# Patient Record
Sex: FEMALE | Race: WHITE | Employment: UNEMPLOYED | URBAN - METROPOLITAN AREA
[De-identification: names, ages, dates, MRNs, and addresses within clinical notes are randomized per-mention and may not be internally consistent; named-entity substitution may affect disease eponyms.]

---

## 2017-09-25 ENCOUNTER — HOSPITAL ENCOUNTER (EMERGENCY)
Age: 27
Discharge: HOME | End: 2017-09-25
Attending: FAMILY MEDICINE

## 2017-09-25 VITALS
OXYGEN SATURATION: 98 % | SYSTOLIC BLOOD PRESSURE: 131 MMHG | RESPIRATION RATE: 22 BRPM | HEIGHT: 61 IN | WEIGHT: 156.3 LBS | TEMPERATURE: 99 F | HEART RATE: 68 BPM | DIASTOLIC BLOOD PRESSURE: 62 MMHG | BODY MASS INDEX: 29.51 KG/M2

## 2017-09-25 DIAGNOSIS — K04.7 DENTAL ABSCESS: Primary | ICD-10-CM

## 2017-09-25 RX ORDER — PENICILLIN V POTASSIUM 500 MG/1
500 TABLET, FILM COATED ORAL 4 TIMES DAILY
Qty: 28 TAB | Refills: 0 | Status: SHIPPED | OUTPATIENT
Start: 2017-09-25 | End: 2017-10-02

## 2017-09-25 NOTE — UC PROVIDER NOTE
Patient is a 24 y.o. female presenting with dental problem. The history is provided by the patient. Dental Pain    This is a new problem. The current episode started more than 1 week ago. The problem occurs daily. The problem has not changed since onset. The pain is located in the right lower mouth. The quality of the pain is aching. The pain is at a severity of 8/10. The patient has no cardiac history. Past Medical History:   Diagnosis Date    Asthma     Community acquired pneumonia     Headache     Migraine    Lung nodule     Neurological disorder     migraines    Second hand smoke exposure         History reviewed. No pertinent surgical history. Family History   Problem Relation Age of Onset    Diabetes Father     Diabetes Mother     Heart Disease Father     Hypertension Father     Diabetes Paternal Grandmother     Hypertension Mother         Social History     Social History    Marital status: SINGLE     Spouse name: N/A    Number of children: N/A    Years of education: N/A     Occupational History    Not on file. Social History Main Topics    Smoking status: Current Every Day Smoker     Packs/day: 1.00    Smokeless tobacco: Former User    Alcohol use No    Drug use: No      Comment: denies any marijuana    Sexual activity: Yes     Partners: Male     Birth control/ protection: Condom     Other Topics Concern    Not on file     Social History Narrative    ** Merged History Encounter **                     ALLERGIES: Review of patient's allergies indicates no known allergies. Review of Systems   Constitutional: Negative for chills. HENT: Positive for dental problem. Vitals:    09/25/17 1337   BP: 131/62   Pulse: 68   Resp: 22   Temp: 99 °F (37.2 °C)   SpO2: 98%   Weight: 70.9 kg (156 lb 4.8 oz)   Height: 5' 1\" (1.549 m)       Physical Exam   Constitutional: She is oriented to person, place, and time. She appears well-developed and well-nourished.    HENT: Multiple fractured teeth   Eyes: Conjunctivae are normal.   Cardiovascular: Normal rate and regular rhythm. Pulmonary/Chest: Effort normal and breath sounds normal.   Neurological: She is alert and oriented to person, place, and time. Skin: Skin is warm and dry. Psychiatric: She has a normal mood and affect. Her behavior is normal. Judgment and thought content normal.   Nursing note and vitals reviewed. MDM     Differential Diagnosis; Clinical Impression; Plan:     CLINICAL IMPRESSION:  Dental abscess  (primary encounter diagnosis)    Plan:  1. PCN  2.   3.   Risk of Significant Complications, Morbidity, and/or Mortality:   Presenting problems: Moderate  Diagnostic procedures: Moderate  Management options:   Moderate  Progress:   Patient progress:  Stable      Procedures

## 2017-09-25 NOTE — DISCHARGE INSTRUCTIONS
Abscessed Tooth: Care Instructions  Your Care Instructions    An abscessed tooth is a tooth that has a pocket of pus in the tissues around it. Pus forms when the body tries to fight an infection caused by bacteria. If the pus cannot drain, it forms an abscess. An abscessed tooth can cause red, swollen gums and throbbing pain, especially when you chew. You may have a bad taste in your mouth and a fever, and your jaw may swell. Damage to the tooth, untreated tooth decay, or gum disease can cause an abscessed tooth. An abscessed tooth needs to be treated by a dental professional right away. If it is not treated, the infection could spread to other parts of your body. Your dentist will give you antibiotics to stop the infection. He or she may make a hole in the tooth or cut open (ebonie) the abscess inside your mouth so that the infection can drain, which should relieve your pain. You may need to have a root canal treatment, which tries to save your tooth by taking out the infected pulp and replacing it with a healing medicine and/or a filling. If these treatments do not work, your tooth may have to be removed. Follow-up care is a key part of your treatment and safety. Be sure to make and go to all appointments, and call your doctor if you are having problems. It's also a good idea to know your test results and keep a list of the medicines you take. How can you care for yourself at home? · Reduce pain and swelling in your face and jaw by putting ice or a cold pack on the outside of your cheek for 10 to 20 minutes at a time. Put a thin cloth between the ice and your skin. · Take pain medicines exactly as directed. ¨ If the doctor gave you a prescription medicine for pain, take it as prescribed. ¨ If you are not taking a prescription pain medicine, ask your doctor if you can take an over-the-counter medicine. · Take your antibiotics as directed. Do not stop taking them just because you feel better.  You need to take the full course of antibiotics. To prevent tooth abscess  · Brush and floss every day, and have regular dental checkups. · Eat a healthy diet, and avoid sugary foods and drinks. · Do not smoke, use e-cigarettes with nicotine, or use spit tobacco. Tobacco and nicotine slow your ability to heal. Tobacco also increases your risk for gum disease and cancer of the mouth and throat. If you need help quitting, talk to your doctor about stop-smoking programs and medicines. These can increase your chances of quitting for good. When should you call for help? Call 911 anytime you think you may need emergency care. For example, call if:  · You have trouble breathing. Call your doctor now or seek immediate medical care if:  · You have new or worse symptoms of infection, such as:  ¨ Increased pain, swelling, warmth, or redness. ¨ Red streaks leading from the area. ¨ Pus draining from the area. ¨ A fever. Watch closely for changes in your health, and be sure to contact your doctor if:  · You do not get better as expected. Where can you learn more? Go to http://yulia-amilcar.info/. Enter C305 in the search box to learn more about \"Abscessed Tooth: Care Instructions. \"  Current as of: September 19, 2016  Content Version: 11.3  © 8802-1223 [employer]. Care instructions adapted under license by [employer] (which disclaims liability or warranty for this information). If you have questions about a medical condition or this instruction, always ask your healthcare professional. [place] [address] any warranty or liability for your use of this information.

## 2017-10-30 ENCOUNTER — HOSPITAL ENCOUNTER (EMERGENCY)
Age: 27
Discharge: HOME | End: 2017-10-30
Attending: FAMILY MEDICINE

## 2017-10-30 VITALS
RESPIRATION RATE: 18 BRPM | DIASTOLIC BLOOD PRESSURE: 85 MMHG | HEIGHT: 61 IN | OXYGEN SATURATION: 98 % | HEART RATE: 72 BPM | TEMPERATURE: 97.8 F | BODY MASS INDEX: 30.21 KG/M2 | WEIGHT: 160 LBS | SYSTOLIC BLOOD PRESSURE: 167 MMHG

## 2017-10-30 DIAGNOSIS — H66.001 ACUTE SUPPURATIVE OTITIS MEDIA OF RIGHT EAR WITHOUT SPONTANEOUS RUPTURE OF TYMPANIC MEMBRANE, RECURRENCE NOT SPECIFIED: Primary | ICD-10-CM

## 2017-10-30 RX ORDER — AMOXICILLIN AND CLAVULANATE POTASSIUM 875; 125 MG/1; MG/1
1 TABLET, FILM COATED ORAL EVERY 12 HOURS
Qty: 20 TAB | Refills: 0 | Status: SHIPPED | OUTPATIENT
Start: 2017-10-30 | End: 2017-11-09

## 2017-10-30 NOTE — DISCHARGE INSTRUCTIONS
Ear Infection (Otitis Media): Care Instructions  Your Care Instructions    An ear infection may start with a cold and affect the middle ear (otitis media). It can hurt a lot. Most ear infections clear up on their own in a couple of days. Most often you will not need antibiotics. This is because many ear infections are caused by a virus. Antibiotics don't work against a virus. Regular doses of pain medicines are the best way to reduce your fever and help you feel better. Follow-up care is a key part of your treatment and safety. Be sure to make and go to all appointments, and call your doctor if you are having problems. It's also a good idea to know your test results and keep a list of the medicines you take. How can you care for yourself at home? · Take pain medicines exactly as directed. ¨ If the doctor gave you a prescription medicine for pain, take it as prescribed. ¨ If you are not taking a prescription pain medicine, take an over-the-counter medicine, such as acetaminophen (Tylenol), ibuprofen (Advil, Motrin), or naproxen (Aleve). Read and follow all instructions on the label. ¨ Do not take two or more pain medicines at the same time unless the doctor told you to. Many pain medicines have acetaminophen, which is Tylenol. Too much acetaminophen (Tylenol) can be harmful. · Plan to take a full dose of pain reliever before bedtime. Getting enough sleep will help you get better. · Try a warm, moist washcloth on the ear. It may help relieve pain. · If your doctor prescribed antibiotics, take them as directed. Do not stop taking them just because you feel better. You need to take the full course of antibiotics. When should you call for help? Call your doctor now or seek immediate medical care if:  ? · You have new or increasing ear pain. ? · You have new or increasing pus or blood draining from your ear. ? · You have a fever with a stiff neck or a severe headache. ? Watch closely for changes in your health, and be sure to contact your doctor if:  ? · You have new or worse symptoms. ? · You are not getting better after taking an antibiotic for 2 days. Where can you learn more? Go to http://yulia-amilcar.info/. Enter C548 in the search box to learn more about \"Ear Infection (Otitis Media): Care Instructions. \"  Current as of: May 12, 2017  Content Version: 11.4  © 5565-4250 [employer]. Care instructions adapted under license by [employer] (which disclaims liability or warranty for this information). If you have questions about a medical condition or this instruction, always ask your healthcare professional. [place] [address] any warranty or liability for your use of this information.

## 2017-10-30 NOTE — UC PROVIDER NOTE
Patient is a 24 y.o. female presenting with ear pain. The history is provided by the patient. Ear Pain    This is a new problem. Episode onset: 2 weeks ago. The problem occurs constantly. The problem has been gradually worsening. Patient complains that the right ear is affected. There has been no fever. The pain is mild. Associated symptoms include rhinorrhea, sore throat and cough. Pertinent negatives include no ear discharge, no headaches, no hearing loss and no rash. Past Medical History:   Diagnosis Date    Asthma     Community acquired pneumonia     Headache     Migraine    Lung nodule     Neurological disorder     migraines    Second hand smoke exposure         No past surgical history on file. Family History   Problem Relation Age of Onset    Diabetes Father     Diabetes Mother     Heart Disease Father     Hypertension Father     Diabetes Paternal Grandmother     Hypertension Mother         Social History     Social History    Marital status: SINGLE     Spouse name: N/A    Number of children: N/A    Years of education: N/A     Occupational History    Not on file. Social History Main Topics    Smoking status: Current Every Day Smoker     Packs/day: 1.00    Smokeless tobacco: Former User    Alcohol use No    Drug use: No      Comment: denies any marijuana    Sexual activity: Yes     Partners: Male     Birth control/ protection: Condom     Other Topics Concern    Not on file     Social History Narrative    ** Merged History Encounter **                     ALLERGIES: Review of patient's allergies indicates no known allergies. Review of Systems   Constitutional: Negative for chills and fever. HENT: Positive for congestion, ear pain, rhinorrhea and sore throat. Negative for ear discharge and hearing loss. Respiratory: Positive for cough. Negative for shortness of breath and wheezing. Cardiovascular: Negative for chest pain and palpitations.    Musculoskeletal: Negative for myalgias. Skin: Negative for rash. Neurological: Negative for headaches. Vitals:    10/30/17 1925   BP: 167/85   Pulse: 72   Resp: 18   Temp: 97.8 °F (36.6 °C)   SpO2: 98%   Weight: 72.6 kg (160 lb)   Height: 5' 1\" (1.549 m)       Physical Exam   Constitutional: She appears well-developed and well-nourished. No distress. HENT:   Right Ear: External ear and ear canal normal. Tympanic membrane is erythematous and bulging. A middle ear effusion is present. Left Ear: Tympanic membrane, external ear and ear canal normal.   Nose: Rhinorrhea present. Right sinus exhibits maxillary sinus tenderness and frontal sinus tenderness. Left sinus exhibits no maxillary sinus tenderness and no frontal sinus tenderness. Mouth/Throat: Mucous membranes are normal. Posterior oropharyngeal edema and posterior oropharyngeal erythema present. No oropharyngeal exudate or tonsillar abscesses. Cardiovascular: Normal rate, regular rhythm and normal heart sounds. Pulmonary/Chest: Effort normal and breath sounds normal. No respiratory distress. She has no wheezes. She has no rales. Lymphadenopathy:     She has cervical adenopathy. Neurological: She is alert. Skin: She is not diaphoretic. Psychiatric: She has a normal mood and affect. Her behavior is normal. Judgment and thought content normal.   Nursing note and vitals reviewed. MDM     Differential Diagnosis; Clinical Impression; Plan:     CLINICAL IMPRESSION:  Acute suppurative otitis media of right ear without spontaneous rupture of tympanic membrane, recurrence not specified  (primary encounter diagnosis)    Plan:  1. Augmentin  2. PCP as needed  Risk of Significant Complications, Morbidity, and/or Mortality:   Presenting problems: Moderate  Management options:   Moderate  Progress:   Patient progress:  Stable      Procedures

## 2018-05-20 ENCOUNTER — HOSPITAL ENCOUNTER (EMERGENCY)
Age: 28
Discharge: HOME | End: 2018-05-20
Attending: EMERGENCY MEDICINE
Payer: SUBSIDIZED

## 2018-05-20 ENCOUNTER — APPOINTMENT (OUTPATIENT)
Dept: ULTRASOUND IMAGING | Age: 28
End: 2018-05-20
Attending: EMERGENCY MEDICINE
Payer: SUBSIDIZED

## 2018-05-20 VITALS
DIASTOLIC BLOOD PRESSURE: 76 MMHG | WEIGHT: 148.59 LBS | HEART RATE: 66 BPM | BODY MASS INDEX: 28.05 KG/M2 | OXYGEN SATURATION: 100 % | TEMPERATURE: 97.6 F | HEIGHT: 61 IN | RESPIRATION RATE: 16 BRPM | SYSTOLIC BLOOD PRESSURE: 134 MMHG

## 2018-05-20 DIAGNOSIS — R10.11 ABDOMINAL PAIN, RIGHT UPPER QUADRANT: Primary | ICD-10-CM

## 2018-05-20 DIAGNOSIS — K80.80 BILIARY CALCULUS OF OTHER SITE WITHOUT OBSTRUCTION: ICD-10-CM

## 2018-05-20 DIAGNOSIS — R11.0 NAUSEA WITHOUT VOMITING: ICD-10-CM

## 2018-05-20 LAB
ALBUMIN SERPL-MCNC: 4.1 G/DL (ref 3.5–5)
ALBUMIN/GLOB SERPL: 1 {RATIO} (ref 1.1–2.2)
ALP SERPL-CCNC: 83 U/L (ref 45–117)
ALT SERPL-CCNC: 13 U/L (ref 12–78)
ANION GAP SERPL CALC-SCNC: 6 MMOL/L (ref 5–15)
AST SERPL-CCNC: 13 U/L (ref 15–37)
BASOPHILS # BLD: 0 K/UL (ref 0–0.1)
BASOPHILS NFR BLD: 0 % (ref 0–1)
BILIRUB SERPL-MCNC: 0.2 MG/DL (ref 0.2–1)
BUN SERPL-MCNC: 11 MG/DL (ref 6–20)
BUN/CREAT SERPL: 12 (ref 12–20)
CALCIUM SERPL-MCNC: 9.8 MG/DL (ref 8.5–10.1)
CHLORIDE SERPL-SCNC: 104 MMOL/L (ref 97–108)
CO2 SERPL-SCNC: 30 MMOL/L (ref 21–32)
CREAT SERPL-MCNC: 0.94 MG/DL (ref 0.55–1.02)
DIFFERENTIAL METHOD BLD: ABNORMAL
EOSINOPHIL # BLD: 0.4 K/UL (ref 0–0.4)
EOSINOPHIL NFR BLD: 4 % (ref 0–7)
ERYTHROCYTE [DISTWIDTH] IN BLOOD BY AUTOMATED COUNT: 11.9 % (ref 11.5–14.5)
GLOBULIN SER CALC-MCNC: 4.2 G/DL (ref 2–4)
GLUCOSE SERPL-MCNC: 89 MG/DL (ref 65–100)
HCG UR QL: NEGATIVE
HCT VFR BLD AUTO: 39 % (ref 35–47)
HGB BLD-MCNC: 13 G/DL (ref 11.5–16)
IMM GRANULOCYTES # BLD: 0 K/UL (ref 0–0.04)
IMM GRANULOCYTES NFR BLD AUTO: 0 % (ref 0–0.5)
LIPASE SERPL-CCNC: 136 U/L (ref 73–393)
LYMPHOCYTES # BLD: 3.9 K/UL (ref 0.8–3.5)
LYMPHOCYTES NFR BLD: 38 % (ref 12–49)
MCH RBC QN AUTO: 30.7 PG (ref 26–34)
MCHC RBC AUTO-ENTMCNC: 33.3 G/DL (ref 30–36.5)
MCV RBC AUTO: 92 FL (ref 80–99)
MONOCYTES # BLD: 0.6 K/UL (ref 0–1)
MONOCYTES NFR BLD: 5 % (ref 5–13)
NEUTS SEG # BLD: 5.4 K/UL (ref 1.8–8)
NEUTS SEG NFR BLD: 53 % (ref 32–75)
NRBC # BLD: 0 K/UL (ref 0–0.01)
NRBC BLD-RTO: 0 PER 100 WBC
PLATELET # BLD AUTO: 445 K/UL (ref 150–400)
PMV BLD AUTO: 9.7 FL (ref 8.9–12.9)
POTASSIUM SERPL-SCNC: 3.5 MMOL/L (ref 3.5–5.1)
PROT SERPL-MCNC: 8.3 G/DL (ref 6.4–8.2)
RBC # BLD AUTO: 4.24 M/UL (ref 3.8–5.2)
SODIUM SERPL-SCNC: 140 MMOL/L (ref 136–145)
WBC # BLD AUTO: 10.3 K/UL (ref 3.6–11)

## 2018-05-20 PROCEDURE — 36415 COLL VENOUS BLD VENIPUNCTURE: CPT | Performed by: EMERGENCY MEDICINE

## 2018-05-20 PROCEDURE — 81025 URINE PREGNANCY TEST: CPT | Performed by: EMERGENCY MEDICINE

## 2018-05-20 PROCEDURE — 99283 EMERGENCY DEPT VISIT LOW MDM: CPT

## 2018-05-20 PROCEDURE — 83690 ASSAY OF LIPASE: CPT | Performed by: EMERGENCY MEDICINE

## 2018-05-20 PROCEDURE — 74011250637 HC RX REV CODE- 250/637: Performed by: EMERGENCY MEDICINE

## 2018-05-20 PROCEDURE — 76705 ECHO EXAM OF ABDOMEN: CPT

## 2018-05-20 PROCEDURE — 80053 COMPREHEN METABOLIC PANEL: CPT | Performed by: EMERGENCY MEDICINE

## 2018-05-20 PROCEDURE — 85025 COMPLETE CBC W/AUTO DIFF WBC: CPT | Performed by: EMERGENCY MEDICINE

## 2018-05-20 RX ORDER — ONDANSETRON 4 MG/1
4 TABLET, ORALLY DISINTEGRATING ORAL
Qty: 20 TAB | Refills: 0 | Status: SHIPPED | OUTPATIENT
Start: 2018-05-20 | End: 2018-08-17

## 2018-05-20 RX ORDER — ONDANSETRON 4 MG/1
4 TABLET, ORALLY DISINTEGRATING ORAL
Status: COMPLETED | OUTPATIENT
Start: 2018-05-20 | End: 2018-05-20

## 2018-05-20 RX ORDER — OXYCODONE AND ACETAMINOPHEN 5; 325 MG/1; MG/1
1 TABLET ORAL
Status: COMPLETED | OUTPATIENT
Start: 2018-05-20 | End: 2018-05-20

## 2018-05-20 RX ORDER — FAMOTIDINE 20 MG/1
20 TABLET, FILM COATED ORAL 2 TIMES DAILY
Qty: 20 TAB | Refills: 0 | Status: SHIPPED | OUTPATIENT
Start: 2018-05-20 | End: 2018-05-30

## 2018-05-20 RX ORDER — OXYCODONE AND ACETAMINOPHEN 5; 325 MG/1; MG/1
1 TABLET ORAL
Qty: 10 TAB | Refills: 0 | Status: ON HOLD | OUTPATIENT
Start: 2018-05-20 | End: 2018-05-29

## 2018-05-20 RX ADMIN — ONDANSETRON 4 MG: 4 TABLET, ORALLY DISINTEGRATING ORAL at 21:05

## 2018-05-20 RX ADMIN — OXYCODONE HYDROCHLORIDE AND ACETAMINOPHEN 1 TABLET: 5; 325 TABLET ORAL at 21:20

## 2018-05-21 NOTE — ED NOTES
Pt presents to ED with c/o right sided flank pain x3 weeks. Pt notices the pain after eating certain foods like dairy or fatty foods. Pt states the pain worsens at night as well. Pt's mother at bedside. Call bell in reach.

## 2018-05-21 NOTE — ED PROVIDER NOTES
EMERGENCY DEPARTMENT HISTORY AND PHYSICAL EXAM      Date: 5/20/2018  Patient Name: aMnny Roman    History of Presenting Illness     Chief Complaint   Patient presents with    Abdominal Pain     Pain at RUQ for 2-3 months, worse at night and after eating certain food. Pain worse today with nausea. History Provided By: Patient    HPI: Manny Roman, 34 y.o. female with PMHx significant for migraines, asthma, presents ambulatory with her mother to the ED with cc of gradual onset, intermittent, moderate RUQ abdominal pain with associated N/V x several months, worsened in the past few days. Her pain radiates to her back. Pt reports that her pain is worse at night after meals, especially greasy/fatty foods. Her pain, in the past, was relieved after emesis but is now persistent. She denies hx of gall stones. She specifically denies diarrhea or fever. There are no other complaints, changes, or physical findings at this time. Social Hx: former Tobacco, - EtOH, - Illicit Drugs    PCP: Sharon Herr MD     Past History     Past Medical History:  Past Medical History:   Diagnosis Date    Asthma     Community acquired pneumonia     Headache(784.0)     Migraine    Lung nodule     Neurological disorder     migraines    Second hand smoke exposure      Past Surgical History:  History reviewed. No pertinent surgical history. Family History:  Family History   Problem Relation Age of Onset    Diabetes Father     Heart Disease Father     Hypertension Father     Diabetes Mother     Hypertension Mother     Diabetes Paternal Grandmother      Social History:  Social History   Substance Use Topics    Smoking status: Former Smoker     Packs/day: 1.00     Years: 5.00     Quit date: 7/20/2017    Smokeless tobacco: Never Used    Alcohol use No     Allergies:  No Known Allergies    Review of Systems   Review of Systems   Constitutional: Negative. Negative for chills and fever. HENT: Negative.   Negative for congestion, facial swelling, rhinorrhea, sore throat, trouble swallowing and voice change. Eyes: Negative. Respiratory: Negative for apnea, cough, chest tightness, shortness of breath and wheezing. Cardiovascular: Negative. Negative for chest pain, palpitations and leg swelling. Gastrointestinal: Positive for abdominal pain (RUQ), nausea and vomiting. Negative for abdominal distention, blood in stool, constipation and diarrhea. Endocrine: Negative. Negative for cold intolerance, heat intolerance and polyuria. Genitourinary: Negative. Negative for difficulty urinating, dysuria, flank pain, frequency, hematuria and urgency. Musculoskeletal: Negative. Negative for arthralgias, back pain, myalgias, neck pain and neck stiffness. Skin: Negative. Negative for color change and rash. Neurological: Negative. Negative for dizziness, syncope, facial asymmetry, speech difficulty, weakness, light-headedness, numbness and headaches. Hematological: Negative. Does not bruise/bleed easily. Psychiatric/Behavioral: Negative. Negative for confusion and self-injury. The patient is not nervous/anxious. Physical Exam   Physical Exam   Constitutional: She is oriented to person, place, and time. She appears well-developed and well-nourished. No distress. HENT:   Head: Normocephalic and atraumatic. Mouth/Throat: Oropharynx is clear and moist. No oropharyngeal exudate. Eyes: Conjunctivae and EOM are normal. Pupils are equal, round, and reactive to light. Neck: Normal range of motion. Cardiovascular: Normal rate, regular rhythm and normal heart sounds. Exam reveals no gallop and no friction rub. No murmur heard. Pulmonary/Chest: Effort normal and breath sounds normal. No respiratory distress. She has no wheezes. She has no rales. She exhibits no tenderness. Abdominal: Soft. Bowel sounds are normal. She exhibits no distension and no mass. There is tenderness in the right upper quadrant. There is no rebound, no guarding and negative Oliva's sign. Musculoskeletal: Normal range of motion. She exhibits no edema, tenderness or deformity. Neurological: She is alert and oriented to person, place, and time. She displays normal reflexes. No cranial nerve deficit. She exhibits normal muscle tone. Coordination normal.   Skin: Skin is warm. No rash noted. She is not diaphoretic. Psychiatric: She has a normal mood and affect. Nursing note and vitals reviewed. Diagnostic Study Results     Labs -     Recent Results (from the past 12 hour(s))   CBC WITH AUTOMATED DIFF    Collection Time: 05/20/18  7:29 PM   Result Value Ref Range    WBC 10.3 3.6 - 11.0 K/uL    RBC 4.24 3.80 - 5.20 M/uL    HGB 13.0 11.5 - 16.0 g/dL    HCT 39.0 35.0 - 47.0 %    MCV 92.0 80.0 - 99.0 FL    MCH 30.7 26.0 - 34.0 PG    MCHC 33.3 30.0 - 36.5 g/dL    RDW 11.9 11.5 - 14.5 %    PLATELET 992 (H) 299 - 400 K/uL    MPV 9.7 8.9 - 12.9 FL    NRBC 0.0 0  WBC    ABSOLUTE NRBC 0.00 0.00 - 0.01 K/uL    NEUTROPHILS 53 32 - 75 %    LYMPHOCYTES 38 12 - 49 %    MONOCYTES 5 5 - 13 %    EOSINOPHILS 4 0 - 7 %    BASOPHILS 0 0 - 1 %    IMMATURE GRANULOCYTES 0 0.0 - 0.5 %    ABS. NEUTROPHILS 5.4 1.8 - 8.0 K/UL    ABS. LYMPHOCYTES 3.9 (H) 0.8 - 3.5 K/UL    ABS. MONOCYTES 0.6 0.0 - 1.0 K/UL    ABS. EOSINOPHILS 0.4 0.0 - 0.4 K/UL    ABS. BASOPHILS 0.0 0.0 - 0.1 K/UL    ABS. IMM.  GRANS. 0.0 0.00 - 0.04 K/UL    DF AUTOMATED     METABOLIC PANEL, COMPREHENSIVE    Collection Time: 05/20/18  7:29 PM   Result Value Ref Range    Sodium 140 136 - 145 mmol/L    Potassium 3.5 3.5 - 5.1 mmol/L    Chloride 104 97 - 108 mmol/L    CO2 30 21 - 32 mmol/L    Anion gap 6 5 - 15 mmol/L    Glucose 89 65 - 100 mg/dL    BUN 11 6 - 20 MG/DL    Creatinine 0.94 0.55 - 1.02 MG/DL    BUN/Creatinine ratio 12 12 - 20      GFR est AA >60 >60 ml/min/1.73m2    GFR est non-AA >60 >60 ml/min/1.73m2    Calcium 9.8 8.5 - 10.1 MG/DL    Bilirubin, total 0.2 0.2 - 1.0 MG/DL    ALT (SGPT) 13 12 - 78 U/L    AST (SGOT) 13 (L) 15 - 37 U/L    Alk. phosphatase 83 45 - 117 U/L    Protein, total 8.3 (H) 6.4 - 8.2 g/dL    Albumin 4.1 3.5 - 5.0 g/dL    Globulin 4.2 (H) 2.0 - 4.0 g/dL    A-G Ratio 1.0 (L) 1.1 - 2.2     LIPASE    Collection Time: 05/20/18  7:29 PM   Result Value Ref Range    Lipase 136 73 - 393 U/L   HCG URINE, QL    Collection Time: 05/20/18  7:30 PM   Result Value Ref Range    HCG urine, QL NEGATIVE  NEG       Radiologic Studies -   ULTRASOUND OF THE RIGHT UPPER QUADRANT     INDICATION: Right upper quadrant abdominal pain, gallstones     COMPARISON: None.     TECHNIQUE:  Sonography of the right upper quadrant was performed.      FINDINGS:     LIVER: Normal echogenicity. No focal hepatic mass or intrahepatic biliary  dilatation is shown. GALLBLADDER: There is at least one large gallstone in the neck, as well as a  small volume of sludge. The distal gallbladder is collapsed. No pericholecystic  fluid. COMMON DUCT: 0. 5 cm in diameter. The duct is normal caliber. PANCREAS: The visualized portions of the pancreas are normal.   RIGHT KIDNEY: 10.8 cm in length. No hydronephrosis, shadowing calculus, or  contour-deforming renal mass.        IMPRESSION  IMPRESSION:   Gallstone(s) and sludge. Medical Decision Making   I am the first provider for this patient. I reviewed the vital signs, available nursing notes, past medical history, past surgical history, family history and social history. Vital Signs-Reviewed the patient's vital signs. Patient Vitals for the past 12 hrs:   Temp Pulse Resp BP SpO2   05/20/18 1923 97.6 °F (36.4 °C) 66 16 134/76 100 %     Pulse Oximetry Analysis - 100% on RA    Records Reviewed: Nursing Notes, Old Medical Records, Previous electrocardiograms, Previous Radiology Studies and Previous Laboratory Studies    Provider Notes (Medical Decision Making):   DDx: biliary colic, cholecystitis, UTI  Pt is a 35 yo F, presenting with biliary colic, and RUQ pain. Her vital signs are stable. Will obtain labs, RUQ US, and reassess. ED Course:   Initial assessment performed. The patients presenting problems have been discussed, and they are in agreement with the care plan formulated and outlined with them. I have encouraged them to ask questions as they arise throughout their visit. Progress Note:  Labs and imaging reviewed; RUQ with gallstone at neck but no pericholecystic fluid, no CBD dilation, normal labs; pt's exam is benign. Will dc home with pain control, diet education, and surgery referral.    9:26 PM  Progress Note:  Pt states she feels much better; pain resolved; denies any nausea; no new symptoms; pt able to tolerate PO and ambulate without issues; pt clinically safe for discharge home with close PCP f/u. At time of discharge, pt had stable vitals and had no questions or concerns, and was very satisfied with overall care. Critical Care Time:   0    Disposition:  Discharge Note:  9:35 PM  The patient has been re-evaluated and is ready for discharge. Reviewed available results with patient. Counseled patient/parent/guardian on diagnosis and care plan. Patient has expressed understanding, and all questions have been answered. Patient agrees with plan and agrees to follow up as recommended, or return to the ED if their symptoms worsen. Discharge instructions have been provided and explained to the patient, along with reasons to return to the ED. PLAN:  1. Current Discharge Medication List      START taking these medications    Details   oxyCODONE-acetaminophen (PERCOCET) 5-325 mg per tablet Take 1 Tab by mouth every four (4) hours as needed for Pain. Max Daily Amount: 6 Tabs. Qty: 10 Tab, Refills: 0    Associated Diagnoses: Abdominal pain, right upper quadrant; Biliary calculus of other site without obstruction      ondansetron (ZOFRAN ODT) 4 mg disintegrating tablet Take 1 Tab by mouth every eight (8) hours as needed for Nausea.   Qty: 20 Tab, Refills: 0    Associated Diagnoses: Abdominal pain, right upper quadrant; Biliary calculus of other site without obstruction      famotidine (PEPCID) 20 mg tablet Take 1 Tab by mouth two (2) times a day for 10 days. Qty: 20 Tab, Refills: 0    Associated Diagnoses: Abdominal pain, right upper quadrant; Biliary calculus of other site without obstruction           2. Follow-up Information     Follow up With Details Comments [address], [address]  [phone]      [facility] EMERGENCY DEPT  As needed, If symptoms worsen [address] [facility] In 1 day  [address] [facility], [address] [place] [address] [address] [facility] [address]  [phone]        Return to ED if worse     Diagnosis     Clinical Impression:   1. Abdominal pain, right upper quadrant    2. Biliary calculus of other site without obstruction    3. Nausea without vomiting      Attestations:    Attestation: This note is prepared by Jaron Yeboah, acting as scribe for MD Leila Tellez MD: The scribe's documentation has been prepared under my direction and personally reviewed by me in its entirety. I confirm that the note above accurately reflects all work, treatment, procedures, and medical decision making performed by me. This note will not be viewable in [address]

## 2018-05-21 NOTE — DISCHARGE INSTRUCTIONS
Thank you! Thank you for allowing us to provide you with excellent care today. We hope we addressed all of your concerns and needs. We strive to provide excellent quality care in the Emergency Department. You may receive a survey after your visit to evaluate the care you were provided. Should you receive a survey from us, we invite you to share your experience and tell us what made it excellent. It was a pleasure serving you, we invite you to share your experience with us, in our pursuit for excellence, should you be selected to receive a survey. If you feel that you have not received excellent quality care or timely care, please ask to speak to the nurse manager. Please choose us in the future for your continued health care needs. ------------------------------------------------------------------------------------------------------------  The exam and treatment you received in the Emergency Department were for an urgent problem and are not intended as complete care. It is important that you follow up with a doctor, nurse practitioner, or physician assistant for ongoing care. If your symptoms become worse or you do not improve as expected and you are unable to reach your usual health care provider, you should return to the Emergency Department. We are available 24 hours a day. Please take your discharge instructions with you when you go to your follow-up appointment. If you have any problem arranging a follow-up appointment, contact the Emergency Department immediately. If a prescription has been provided, please have it filled as soon as possible to prevent a delay in treatment. Read the entire medication instruction sheet provided to you by the pharmacy. If you have any questions or reservations about taking the medication due to side effects or interactions with other medications, please call your primary care physician or contact the ER to speak with the charge nurse.      Make an appointment with your family doctor or the physician you were referred to for follow-up of this visit as instructed on your discharge paperwork, as this is mandatory follow-up. Return to the ER if you are unable to be seen or if you are unable to be seen in a timely manner. If you have any problem arranging the follow-up visit, contact the Emergency Department immediately. Abdominal Pain: Care Instructions  Your Care Instructions    Abdominal pain has many possible causes. Some aren't serious and get better on their own in a few days. Others need more testing and treatment. If your pain continues or gets worse, you need to be rechecked and may need more tests to find out what is wrong. You may need surgery to correct the problem. Don't ignore new symptoms, such as fever, nausea and vomiting, urination problems, pain that gets worse, and dizziness. These may be signs of a more serious problem. Your doctor may have recommended a follow-up visit in the next 8 to 12 hours. If you are not getting better, you may need more tests or treatment. The doctor has checked you carefully, but problems can develop later. If you notice any problems or new symptoms, get medical treatment right away. Follow-up care is a key part of your treatment and safety. Be sure to make and go to all appointments, and call your doctor if you are having problems. It's also a good idea to know your test results and keep a list of the medicines you take. How can you care for yourself at home? · Rest until you feel better. · To prevent dehydration, drink plenty of fluids, enough so that your urine is light yellow or clear like water. Choose water and other caffeine-free clear liquids until you feel better. If you have kidney, heart, or liver disease and have to limit fluids, talk with your doctor before you increase the amount of fluids you drink.   · If your stomach is upset, eat mild foods, such as rice, dry toast or crackers, bananas, and applesauce. Try eating several small meals instead of two or three large ones. · Wait until 48 hours after all symptoms have gone away before you have spicy foods, alcohol, and drinks that contain caffeine. · Do not eat foods that are high in fat. · Avoid anti-inflammatory medicines such as aspirin, ibuprofen (Advil, Motrin), and naproxen (Aleve). These can cause stomach upset. Talk to your doctor if you take daily aspirin for another health problem. When should you call for help? Call 911 anytime you think you may need emergency care. For example, call if:  ? · You passed out (lost consciousness). ? · You pass maroon or very bloody stools. ? · You vomit blood or what looks like coffee grounds. ? · You have new, severe belly pain. ?Call your doctor now or seek immediate medical care if:  ? · Your pain gets worse, especially if it becomes focused in one area of your belly. ? · You have a new or higher fever. ? · Your stools are black and look like tar, or they have streaks of blood. ? · You have unexpected vaginal bleeding. ? · You have symptoms of a urinary tract infection. These may include:  ¨ Pain when you urinate. ¨ Urinating more often than usual.  ¨ Blood in your urine. ? · You are dizzy or lightheaded, or you feel like you may faint. ? Watch closely for changes in your health, and be sure to contact your doctor if:  ? · You are not getting better after 1 day (24 hours). Where can you learn more? Go to http://yulia-amilcar.info/. Enter J200 in the search box to learn more about \"Abdominal Pain: Care Instructions. \"  Current as of: March 20, 2017  Content Version: 11.4  © 8541-6369 [employer]. Care instructions adapted under license by [employer] (which disclaims liability or warranty for this information).  If you have questions about a medical condition or this instruction, always ask your healthcare professional. [employer] disclaims any warranty or liability for your use of this information.

## 2018-05-21 NOTE — ED NOTES
Patient discharged by Dr. Obdulia Bolden. Patient provided with discharge instructions Rx and instructions on follow up care. Patient out of ED ambulatory accompanied by mother.

## 2018-05-21 NOTE — ED TRIAGE NOTES
Pt arrives to Ed with c/o right sided flank pain after eating fatty and/or dairy foods, pt states pain worsens at night x3 weeks.  Mother at bedside

## 2018-05-22 ENCOUNTER — OFFICE VISIT (OUTPATIENT)
Dept: SURGERY | Age: 28
End: 2018-05-22

## 2018-05-22 VITALS
WEIGHT: 144 LBS | TEMPERATURE: 99.4 F | HEIGHT: 61 IN | BODY MASS INDEX: 27.19 KG/M2 | SYSTOLIC BLOOD PRESSURE: 120 MMHG | HEART RATE: 62 BPM | OXYGEN SATURATION: 96 % | RESPIRATION RATE: 18 BRPM | DIASTOLIC BLOOD PRESSURE: 66 MMHG

## 2018-05-22 DIAGNOSIS — L98.9 SKIN LESION OF BACK: ICD-10-CM

## 2018-05-22 DIAGNOSIS — K80.20 SYMPTOMATIC CHOLELITHIASIS: Primary | ICD-10-CM

## 2018-05-22 NOTE — PATIENT INSTRUCTIONS
Cholecystectomy: Before Your Surgery  What is cholecystectomy? Cholecystectomy (bd-gpm-gqq-SIMON-tuh-arthur) is a type of surgery. It removes a diseased gallbladder. This surgery is usually done as a laparoscopic surgery. The doctor puts a lighted tube and other surgical tools through small cuts (incisions) in your belly. The tube is called a scope. It lets your doctor see your organs so he or she can do the surgery. The incisions leave scars that fade with time. Most people go home the same day. You probably will feel better each day. Most people have only a small amount of pain after 1 week. If you have a desk job, you can probably go back to work in 1 to 2 weeks. If you lift heavy objects or have a very active job, it may take up to 4 weeks. In some cases, open surgery is the best choice. Your doctor may choose open surgery in advance. Or he or she may choose it in the middle of laparoscopic surgery. In open surgery, the doctor makes a larger incision in your upper belly. If you have open surgery, you will probably stay in the hospital for 2 to 4 days. And it may take 4 to 6 weeks to get back to your normal routine. Follow-up care is a key part of your treatment and safety. Be sure to make and go to all appointments, and call your doctor if you are having problems. It's also a good idea to know your test results and keep a list of the medicines you take. What happens before surgery? ?Surgery can be stressful. This information will help you understand what you can expect. And it will help you safely prepare for surgery. ? Preparing for surgery  ? · Understand exactly what surgery is planned, along with the risks, benefits, and other options. · Tell your doctors ALL the medicines, vitamins, supplements, and herbal remedies you take. Some of these can increase the risk of bleeding or interact with anesthesia. ?  · If you take blood thinners, such as warfarin (Coumadin), clopidogrel (Plavix), or aspirin, be sure to talk to your doctor. He or she will tell you if you should stop taking these medicines before your surgery. Make sure that you understand exactly what your doctor wants you to do.   ? · Your doctor will tell you which medicines to take or stop before your surgery. You may need to stop taking certain medicines a week or more before surgery. So talk to your doctor as soon as you can.   ? · If you have an advance directive, let your doctor know. It may include a living will and a durable power of  for health care. Bring a copy to the hospital. If you don't have one, you may want to prepare one. It lets your doctor and loved ones know your health care wishes. Doctors advise that everyone prepare these papers before any type of surgery or procedure. ? · You may need to empty your colon with an enema or laxative. Your doctor will tell you how to do this. What happens on the day of surgery? · Follow the instructions exactly about when to stop eating and drinking. If you don't, your surgery may be canceled. If your doctor told you to take your medicines on the day of surgery, take them with only a sip of water. ? · Take a bath or shower before you come in for your surgery. Do not apply lotions, perfumes, deodorants, or nail polish. ? · Do not shave the surgical site yourself. ? · Take off all jewelry and piercings. And take out contact lenses, if you wear them. ? At the hospital or surgery center   · Bring a picture ID. ? · The area for surgery is often marked to make sure there are no errors. ? · You will be kept comfortable and safe by your anesthesia provider. You will be asleep during the surgery. ? · The surgery usually takes 1 to 2 hours. Going home   · Be sure you have someone to drive you home. Anesthesia and pain medicine make it unsafe for you to drive. ? · You will be given more specific instructions about recovering from your surgery.  They will cover things like diet, wound care, follow-up care, driving, and getting back to your normal routine. When should you call your doctor? · You have questions or concerns. ? · You don't understand how to prepare for your surgery. ? · You become ill before the surgery (such as fever, flu, or a cold). ? · You need to reschedule or have changed your mind about having the surgery. Where can you learn more? Go to http://yulia-amilcar.info/. Enter Q484 in the search box to learn more about \"Cholecystectomy: Before Your Surgery. \"  Current as of: May 12, 2017  Content Version: 11.4  © 6325-7468 Healthwise, [employer]. Care instructions adapted under license by [employer] (which disclaims liability or warranty for this information). If you have questions about a medical condition or this instruction, always ask your healthcare professional. [address] any warranty or liability for your use of this information.

## 2018-05-22 NOTE — PROGRESS NOTES
1. Have you been to the ER, urgent care clinic since your last visit? Hospitalized since your last visit? ED [facility] ED 5/20/18   abd pain    2. Have you seen or consulted any other health care providers outside of the [facility] since your last visit? Include any pap smears or colon screening.    no

## 2018-05-22 NOTE — MR AVS SNAPSHOT
[address] [facility] [address] [facility] [address] [id number] 
[phone] Patient: Jason Ramsey MRN: [id number] QOD:0/0/8738 Visit Information Date & Time Provider Department Dept. Phone Encounter #  
 5/22/2018  2:00 PM [facility] [id number] [phone] [id number] Your Appointments 6/11/2018 10:40 AM  
POST OP with Cayla Eid NP  
[facility] GENERAL SURGERY [address] ([address] [facility]) Appt Note: 5/29: NL: LAP JANEEN WITH EXCISION OF SKIN LESION OF THE BACK,   PO  
 [address] [facility] [phone]  
[address] [facility] Upcoming Health Maintenance Date Due  
 HPV Age 9Y-34Y (1 of 3 - Female 3 Dose Series) 3/3/2003 Pneumococcal 19-64 Medium Risk (1 of 1 - PPSV23) 3/3/2011 DTaP/Tdap/Td series (1 - Tdap) 3/3/2013 PAP AKA CERVICAL CYTOLOGY 3/3/2013 Influenza Age 5 to Adult 8/1/2018 Allergies as of 5/22/2018  Review Complete On: 5/22/2018 By: Tavo Scott MD  
 No Known Allergies Current Immunizations  Never Reviewed No immunizations on file. Not reviewed this visit You Were Diagnosed With   
  
 Codes Comments Symptomatic cholelithiasis    -  Primary ICD-10-CM: K80.20 ICD-9-CM: 574.20 Skin lesion of back     ICD-10-CM: L98.9 ICD-9-CM: 709.9 Vitals BP Pulse Temp Resp Height(growth percentile) Weight(growth percentile) 120/66 62 99.4 °F (37.4 °C) 18 5' 1\" (1.549 m) 144 lb (65.3 kg) LMP SpO2 BMI OB Status Smoking Status 05/03/2018 96% 27.21 kg/m2 Having regular periods Former Smoker Vitals History BMI and BSA Data Body Mass Index Body Surface Area  
 27.21 kg/m 2 1.68 m 2 Preferred Pharmacy Pharmacy Name Phone [facility] [address] AT [facility] [facility] [address] [phone] Your Updated Medication List  
  
   
This list is accurate as of 5/22/18  2:59 PM.  Always use your most recent med list.  
  
  
  
  
 famotidine 20 mg tablet Commonly known as:  PEPCID Take 1 Tab by mouth two (2) times a day for 10 days. ondansetron 4 mg disintegrating tablet Commonly known as:  ZOFRAN ODT Take 1 Tab by mouth every eight (8) hours as needed for Nausea. oxyCODONE-acetaminophen 5-325 mg per tablet Commonly known as:  PERCOCET Take 1 Tab by mouth every four (4) hours as needed for Pain. Max Daily Amount: 6 Tabs. Patient Instructions Cholecystectomy: Before Your Surgery What is cholecystectomy? Cholecystectomy (jd-hio-wtf-SIMON-tuh-arthur) is a type of surgery. It removes a diseased gallbladder. This surgery is usually done as a laparoscopic surgery. The doctor puts a lighted tube and other surgical tools through small cuts (incisions) in your belly. The tube is called a scope. It lets your doctor see your organs so he or she can do the surgery. The incisions leave scars that fade with time. Most people go home the same day. You probably will feel better each day. Most people have only a small amount of pain after 1 week. If you have a desk job, you can probably go back to work in 1 to 2 weeks. If you lift heavy objects or have a very active job, it may take up to 4 weeks. In some cases, open surgery is the best choice. Your doctor may choose open surgery in advance. Or he or she may choose it in the middle of laparoscopic surgery. In open surgery, the doctor makes a larger incision in your upper belly. If you have open surgery, you will probably stay in the hospital for 2 to 4 days. And it may take 4 to 6 weeks to get back to your normal routine. Follow-up care is a key part of your treatment and safety.  Be sure to make and go to all appointments, and call your doctor if you are having problems. It's also a good idea to know your test results and keep a list of the medicines you take. What happens before surgery? ?Surgery can be stressful. This information will help you understand what you can expect. And it will help you safely prepare for surgery. ? Preparing for surgery ? · Understand exactly what surgery is planned, along with the risks, benefits, and other options. · Tell your doctors ALL the medicines, vitamins, supplements, and herbal remedies you take. Some of these can increase the risk of bleeding or interact with anesthesia. ? · If you take blood thinners, such as warfarin (Coumadin), clopidogrel (Plavix), or aspirin, be sure to talk to your doctor. He or she will tell you if you should stop taking these medicines before your surgery. Make sure that you understand exactly what your doctor wants you to do.  
? · Your doctor will tell you which medicines to take or stop before your surgery. You may need to stop taking certain medicines a week or more before surgery. So talk to your doctor as soon as you can.  
? · If you have an advance directive, let your doctor know. It may include a living will and a durable power of  for health care. Bring a copy to the hospital. If you don't have one, you may want to prepare one. It lets your doctor and loved ones know your health care wishes. Doctors advise that everyone prepare these papers before any type of surgery or procedure. ? · You may need to empty your colon with an enema or laxative. Your doctor will tell you how to do this. What happens on the day of surgery? · Follow the instructions exactly about when to stop eating and drinking. If you don't, your surgery may be canceled. If your doctor told you to take your medicines on the day of surgery, take them with only a sip of water. ? · Take a bath or shower before you come in for your surgery. Do not apply lotions, perfumes, deodorants, or nail polish. ? · Do not shave the surgical site yourself. ? · Take off all jewelry and piercings. And take out contact lenses, if you wear them. ? At the hospital or surgery center · Bring a picture ID. ? · The area for surgery is often marked to make sure there are no errors. ? · You will be kept comfortable and safe by your anesthesia provider. You will be asleep during the surgery. ? · The surgery usually takes 1 to 2 hours. Going home · Be sure you have someone to drive you home. Anesthesia and pain medicine make it unsafe for you to drive. ? · You will be given more specific instructions about recovering from your surgery. They will cover things like diet, wound care, follow-up care, driving, and getting back to your normal routine. When should you call your doctor? · You have questions or concerns. ? · You don't understand how to prepare for your surgery. ? · You become ill before the surgery (such as fever, flu, or a cold). ? · You need to reschedule or have changed your mind about having the surgery. Where can you learn more? Go to http://yulia-amilcar.info/. Enter O167 in the search box to learn more about \"Cholecystectomy: Before Your Surgery. \" Current as of: May 12, 2017 Content Version: 11.4 © 4774-7962 Healthwise, Incorporated. Care instructions adapted under license by [employer] (which disclaims liability or warranty for this information). If you have questions about a medical condition or this instruction, always ask your healthcare professional. [place] [address] any warranty or liability for your use of this information. Introducing [facility]! Lucrecia Allred introduces [employer] patient portal. Now you can access parts of your medical record, email your doctor's office, and request medication refills online. 1. In your internet browser, go to [email or web address] 2. Click on the First Time User? Click Here link in the Sign In box. You will see the New Member Sign Up page. 3. Enter your Chronogolf Access Code exactly as it appears below. You will not need to use this code after youve completed the sign-up process. If you do not sign up before the expiration date, you must request a new code. · Chronogolf Access Code: [email or web address] Expires: 8/18/2018  7:21 PM 
 
4. Enter the last four digits of your Social Security Number (xxxx) and Date of Birth (mm/dd/yyyy) as indicated and click Submit. You will be taken to the next sign-up page. 5. Create a Chronogolf ID. This will be your Chronogolf login ID and cannot be changed, so think of one that is secure and easy to remember. 6. Create a Chronogolf password. You can change your password at any time. 7. Enter your Password Reset Question and Answer. This can be used at a later time if you forget your password. 8. Enter your e-mail address. You will receive e-mail notification when new information is available in [address] 9. Click Sign Up. You can now view and download portions of your medical record. 10. Click the Download Summary menu link to download a portable copy of your medical information. If you have questions, please visit the Frequently Asked Questions section of the Chronogolf website. Remember, Chronogolf is NOT to be used for urgent needs. For medical emergencies, dial 911. Now available from your iPhone and Android! Please provide this summary of care documentation to your next provider. Your primary care clinician is listed as Raven Warner. If you have any questions after today's visit, please call [phone].

## 2018-05-22 NOTE — PROGRESS NOTES
[facility] History and Physical    History of Present Illness:      Danelle Pressley is a 34 y.o. female who has been having RUQ pain. She has been having RUQ abdominal pain for the past few months. The pain has been getting more frequent and more painful. The pain is a 5 of 10 when she has it. It seems to be mostly related to eating food and fatty foods. She says the pain will radiate to the R back as well. She has no other pain. She denies any nausea or vomiting. She also has a skin lesion on the back that has been present for about 7 years that she would like to have removed. Past Medical History:   Diagnosis Date    Asthma     Community acquired pneumonia     GERD (gastroesophageal reflux disease)     Headache(784.0)     Migraine    Lung nodule     Neurological disorder     migraines    Second hand smoke exposure        No past surgical history on file. Current Outpatient Prescriptions:     oxyCODONE-acetaminophen (PERCOCET) 5-325 mg per tablet, Take 1 Tab by mouth every four (4) hours as needed for Pain. Max Daily Amount: 6 Tabs., Disp: 10 Tab, Rfl: 0    ondansetron (ZOFRAN ODT) 4 mg disintegrating tablet, Take 1 Tab by mouth every eight (8) hours as needed for Nausea., Disp: 20 Tab, Rfl: 0    famotidine (PEPCID) 20 mg tablet, Take 1 Tab by mouth two (2) times a day for 10 days. , Disp: 20 Tab, Rfl: 0    No Known Allergies    Social History     Social History    Marital status: SINGLE     Spouse name: N/A    Number of children: N/A    Years of education: N/A     Occupational History    Not on file.      Social History Main Topics    Smoking status: Former Smoker     Packs/day: 1.00     Years: 5.00     Quit date: 7/20/2017    Smokeless tobacco: Never Used    Alcohol use No    Drug use: No      Comment: denies any marijuana    Sexual activity: Yes     Partners: Male     Birth control/ protection: Condom     Other Topics Concern    Not on file     Social History Narrative    ** Merged History Encounter **            Family History   Problem Relation Age of Onset    Diabetes Father     Heart Disease Father     Hypertension Father     Diabetes Mother     Hypertension Mother     Diabetes Paternal Grandmother        ROS   Constitutional: negative  Ears, Nose, Mouth, Throat, and Face: negative  Respiratory: negative  Cardiovascular: negative  Gastrointestinal: positive for nausea and abdominal pain  Genitourinary:negative  Integument/Breast: negative  Hematologic/Lymphatic: negative  Behavioral/Psychiatric: negative  Allergic/Immunologic: negative      Physical Exam:     Visit Vitals    /66    Pulse 62    Temp 99.4 °F (37.4 °C)    Resp 18    Ht 5' 1\" (1.549 m)    Wt 144 lb (65.3 kg)    SpO2 96%    BMI 27.21 kg/m2       General - alert and oriented, no apparent distress  HEENT - no jaundice, no hearing imparement  Pulm - CTAB, no C/W/R  CV - RRR, no M/R/G  Abd - soft, ND, BS present, mild TTP in RUQ, no hernia, no guarding  Ext - pulses intact in UE and LE bilaterally, no edema  Skin - supple, no rashes, 5mm skin lesion of the mid back with slightly raised appearance and slight dark round discoloration  Psychiatric - normal affect, good mood    Labs  Lab Results   Component Value Date/Time    Sodium 140 05/20/2018 07:29 PM    Potassium 3.5 05/20/2018 07:29 PM    Chloride 104 05/20/2018 07:29 PM    CO2 30 05/20/2018 07:29 PM    Anion gap 6 05/20/2018 07:29 PM    Glucose 89 05/20/2018 07:29 PM    BUN 11 05/20/2018 07:29 PM    Creatinine 0.94 05/20/2018 07:29 PM    BUN/Creatinine ratio 12 05/20/2018 07:29 PM    GFR est AA >60 05/20/2018 07:29 PM    GFR est non-AA >60 05/20/2018 07:29 PM    Calcium 9.8 05/20/2018 07:29 PM    Bilirubin, total 0.2 05/20/2018 07:29 PM    AST (SGOT) 13 (L) 05/20/2018 07:29 PM    Alk.  phosphatase 83 05/20/2018 07:29 PM    Protein, total 8.3 (H) 05/20/2018 07:29 PM    Albumin 4.1 05/20/2018 07:29 PM    Globulin 4.2 (H) 05/20/2018 07:29 PM    A-G Ratio 1.0 (L) 05/20/2018 07:29 PM    ALT (SGPT) 13 05/20/2018 07:29 PM     Lab Results   Component Value Date/Time    WBC 10.3 05/20/2018 07:29 PM    HGB 13.0 05/20/2018 07:29 PM    HCT 39.0 05/20/2018 07:29 PM    PLATELET 087 (H) 05/92/3498 07:29 PM    MCV 92.0 05/20/2018 07:29 PM         Imaging  RUQ US - LIVER: Normal echogenicity. No focal hepatic mass or intrahepatic biliary  dilatation is shown. GALLBLADDER: There is at least one large gallstone in the neck, as well as a  small volume of sludge. The distal gallbladder is collapsed. No pericholecystic  fluid. COMMON DUCT: 0. 5 cm in diameter. The duct is normal caliber. PANCREAS: The visualized portions of the pancreas are normal.   RIGHT KIDNEY: 10.8 cm in length. No hydronephrosis, shadowing calculus, or  contour-deforming renal mass.        IMPRESSION  IMPRESSION:   Gallstone(s) and sludge. I have reviewed and agree with all of the pertinent images    Assessment:     Martha Opitz is a 34 y.o. female with symptomatic cholelithiasis and skin lesion of the back    Recommendations:     1. She will need laparoscopic cholecystectomy in the OR for her gallstones and pain. She also will need excision of the skin lesion in the OR as well. The skin lesion has been there for a few years but maybe getting a little darker and will excise in the OR. It does not have the appearance of a melanoma but I cannot be sure until removing it in the OR. I have discussed the above procedure with the patient in detail. We reviewed the benefits and possible complications of the surgery which include bleeding, infection, damage to adjacent organs, venous thromboembolism, need for repeat surgery, death and other unforseen complications. The patient agreed to proceed with the surgery. Kristofer Johnson MD    Ms. Rock Martinez has a reminder for a \"due or due soon\" health maintenance.  I have asked that she contact her primary care provider for follow-up on this health maintenance.

## 2018-05-22 NOTE — LETTER
5/22/2018 3:00 PM 
 
Patient:  Saud Mao YOB: 1990 Date of Visit: 5/22/2018 Dear Garry Haque MD 
[address] [facility] 
[address] [address] VIA In Basket 
 : Thank you for referring Ms. James Pompa to me for evaluation/treatment. Below are the relevant portions of my assessment and plan of care. If you have questions, please do not hesitate to call me. I look forward to following Ms. Lebron along with you. Sincerely, Radha Perera MD

## 2018-05-22 NOTE — LETTER
5/22/2018 2:50 PM 
 
Ms. [address] [facility] [id number] Surgery is scheduled as follows: 
 
Surgery date: 05/29/2018    Location: [facility]  
 
Arrival time: 7:00 AM     Start time: 9:00 AM 
 
DO NOT EAT OR DRINK ANYTHING PAST MIDNIGHT THE NIGHT BEFORE SURGERY 
______________________________________________________________________ 1st Post Operative appointment:  
 
06/11/2018 at 10:40 AM with Gioia Essex, NP [address] Office Phone: [phone] For questions regarding this information please contact Adis Alcala at [phone]. Sincerely, Adis Alcala Surgical Scheduler Pre-Operative Instructions **DO NOT EAT or DRINK ANYTHING AFTER MIDNIGHT THE NIGHT BEFORE YOUR SURGERY** 
Please report to Patient Registration/Admitting at the time given to you by your Surgeons office  Located at the [address] [place] [address] single family member may accompany you to the pre-operative waiting area until you are called to be prepped for surgery. Family members will be escorted to a waiting room while you are in surgery. If  your physical condition changes (fever, cold, flu), please contact your Surgeon as soon as possible. DO BRING your Insurance card and a photo ID, such as a Drivers License. Valuables are to be left at home. DO NOT wear make-up, lotion, powder, perfume/cologne/aftershave, or nail polish (unless clear). You may wear deodorant. DO NOT wear metal objects such as jewelry or hair pins. Remove all piercings/body jewelry. WEAR COMFORTABLE CLOTHING THAT WILL BE EASY TO REMOVE. If you are staying overnight, please pack a bag and leave it in your vehicle until after surgery.  We recommend that you pack your toiletry items, a robe/pajamas, slippers or any other items that you need for your comfort. Have a family member deliver your bag to your room after your surgery  the Hospital does not a secure location to store these personal items. You may bring a case for glasses, contact lenses, or hearing aids. Denture cups are provided by the [address] [place] [address] Road OR AFTER YOUR SURGERY. YOU SHOULD NOT OPERATE A VEHICLE FOR 24 HOURS AFTER RECEIVING SEDATION OR ANESTHESIA. Please arrange for a family member or friend to drive you home after your surgery: You will not be allowed to take a cab or drive yourself home. If you are discharged the day of surgery, it is recommended that you have someone stay with you until the next morning. For questions regarding the above information, please contact the [facility]  office at [phone].

## 2018-05-24 ENCOUNTER — TELEPHONE (OUTPATIENT)
Dept: SURGERY | Age: 28
End: 2018-05-24

## 2018-05-24 DIAGNOSIS — K80.20 SYMPTOMATIC CHOLELITHIASIS: Primary | ICD-10-CM

## 2018-05-24 RX ORDER — OXYCODONE AND ACETAMINOPHEN 5; 325 MG/1; MG/1
1 TABLET ORAL
Qty: 20 TAB | Refills: 0 | Status: ON HOLD | OUTPATIENT
Start: 2018-05-24 | End: 2018-05-29 | Stop reason: SDUPTHER

## 2018-05-24 NOTE — TELEPHONE ENCOUNTER
Made patient aware prescription for pain medication is ready at  for pickup and she needs to bring a photo ID.

## 2018-05-24 NOTE — TELEPHONE ENCOUNTER
Please call patient. She is having pain and discomfort and has only 1 pain pill left. She states that she has surgery coming up on Tuesday.

## 2018-05-29 ENCOUNTER — HOSPITAL ENCOUNTER (OUTPATIENT)
Age: 28
Setting detail: OUTPATIENT SURGERY
Discharge: HOME | End: 2018-05-29
Attending: SURGERY | Admitting: SURGERY
Payer: SUBSIDIZED

## 2018-05-29 ENCOUNTER — ANESTHESIA EVENT (OUTPATIENT)
Dept: SURGERY | Age: 28
End: 2018-05-29
Payer: SUBSIDIZED

## 2018-05-29 ENCOUNTER — TELEPHONE (OUTPATIENT)
Dept: SURGERY | Age: 28
End: 2018-05-29

## 2018-05-29 ENCOUNTER — ANESTHESIA (OUTPATIENT)
Dept: SURGERY | Age: 28
End: 2018-05-29
Payer: SUBSIDIZED

## 2018-05-29 VITALS
HEART RATE: 71 BPM | BODY MASS INDEX: 27.19 KG/M2 | TEMPERATURE: 98.4 F | HEIGHT: 61 IN | WEIGHT: 144 LBS | OXYGEN SATURATION: 99 % | SYSTOLIC BLOOD PRESSURE: 141 MMHG | DIASTOLIC BLOOD PRESSURE: 67 MMHG | RESPIRATION RATE: 17 BRPM

## 2018-05-29 DIAGNOSIS — R10.11 ABDOMINAL PAIN, RIGHT UPPER QUADRANT: ICD-10-CM

## 2018-05-29 DIAGNOSIS — K80.80 BILIARY CALCULUS OF OTHER SITE WITHOUT OBSTRUCTION: ICD-10-CM

## 2018-05-29 LAB — HCG UR QL: NEGATIVE

## 2018-05-29 PROCEDURE — 77030039266 HC ADH SKN EXOFIN S2SG -A: Performed by: SURGERY

## 2018-05-29 PROCEDURE — 77030031139 HC SUT VCRL2 J&J -A: Performed by: SURGERY

## 2018-05-29 PROCEDURE — 77030035051: Performed by: SURGERY

## 2018-05-29 PROCEDURE — 77030018836 HC SOL IRR NACL ICUM -A: Performed by: SURGERY

## 2018-05-29 PROCEDURE — 77030020747 HC TU INSUF ENDOSC TELE -A: Performed by: SURGERY

## 2018-05-29 PROCEDURE — 77030032490 HC SLV COMPR SCD KNE COVD -B: Performed by: SURGERY

## 2018-05-29 PROCEDURE — 77030012029 HC APPL CLP LIG COVD -C: Performed by: SURGERY

## 2018-05-29 PROCEDURE — 74011000250 HC RX REV CODE- 250

## 2018-05-29 PROCEDURE — 77030013079 HC BLNKT BAIR HGGR 3M -A: Performed by: ANESTHESIOLOGY

## 2018-05-29 PROCEDURE — 77030008756 HC TU IRR SUC STRY -B: Performed by: SURGERY

## 2018-05-29 PROCEDURE — 77030037032 HC INSRT SCIS CLICKLLINE DISP STOR -B: Performed by: SURGERY

## 2018-05-29 PROCEDURE — 77030020053 HC ELECTRD LAPSCP COVD -B: Performed by: SURGERY

## 2018-05-29 PROCEDURE — 77030035048 HC TRCR ENDOSC OPTCL COVD -B: Performed by: SURGERY

## 2018-05-29 PROCEDURE — 77030002895 HC DEV VASC CLOSR COVD -B: Performed by: SURGERY

## 2018-05-29 PROCEDURE — 77030011640 HC PAD GRND REM COVD -A: Performed by: SURGERY

## 2018-05-29 PROCEDURE — 77030008684 HC TU ET CUF COVD -B: Performed by: ANESTHESIOLOGY

## 2018-05-29 PROCEDURE — 77030009852 HC PCH RTVR ENDOSC COVD -B: Performed by: SURGERY

## 2018-05-29 PROCEDURE — 74011250636 HC RX REV CODE- 250/636: Performed by: SURGERY

## 2018-05-29 PROCEDURE — 77030035045 HC TRCR ENDOSC VRSPRT BLDLSS COVD -B: Performed by: SURGERY

## 2018-05-29 PROCEDURE — 88305 TISSUE EXAM BY PATHOLOGIST: CPT | Performed by: SURGERY

## 2018-05-29 PROCEDURE — 76010000153 HC OR TIME 1.5 TO 2 HR: Performed by: SURGERY

## 2018-05-29 PROCEDURE — 76060000034 HC ANESTHESIA 1.5 TO 2 HR: Performed by: SURGERY

## 2018-05-29 PROCEDURE — 77030020782 HC GWN BAIR PAWS FLX 3M -B

## 2018-05-29 PROCEDURE — 74011000250 HC RX REV CODE- 250: Performed by: SURGERY

## 2018-05-29 PROCEDURE — 74011250637 HC RX REV CODE- 250/637: Performed by: ANESTHESIOLOGY

## 2018-05-29 PROCEDURE — 74011250636 HC RX REV CODE- 250/636

## 2018-05-29 PROCEDURE — 74011250636 HC RX REV CODE- 250/636: Performed by: ANESTHESIOLOGY

## 2018-05-29 PROCEDURE — 76210000020 HC REC RM PH II FIRST 0.5 HR: Performed by: SURGERY

## 2018-05-29 PROCEDURE — 88304 TISSUE EXAM BY PATHOLOGIST: CPT | Performed by: SURGERY

## 2018-05-29 PROCEDURE — 81025 URINE PREGNANCY TEST: CPT

## 2018-05-29 PROCEDURE — 76210000017 HC OR PH I REC 1.5 TO 2 HR: Performed by: SURGERY

## 2018-05-29 PROCEDURE — 77030026438 HC STYL ET INTUB CARD -A: Performed by: ANESTHESIOLOGY

## 2018-05-29 PROCEDURE — 77030002933 HC SUT MCRYL J&J -A: Performed by: SURGERY

## 2018-05-29 RX ORDER — DEXAMETHASONE SODIUM PHOSPHATE 4 MG/ML
INJECTION, SOLUTION INTRA-ARTICULAR; INTRALESIONAL; INTRAMUSCULAR; INTRAVENOUS; SOFT TISSUE AS NEEDED
Status: DISCONTINUED | OUTPATIENT
Start: 2018-05-29 | End: 2018-05-29 | Stop reason: HOSPADM

## 2018-05-29 RX ORDER — HYDROMORPHONE HYDROCHLORIDE 1 MG/ML
INJECTION, SOLUTION INTRAMUSCULAR; INTRAVENOUS; SUBCUTANEOUS AS NEEDED
Status: DISCONTINUED | OUTPATIENT
Start: 2018-05-29 | End: 2018-05-29 | Stop reason: HOSPADM

## 2018-05-29 RX ORDER — FENTANYL CITRATE 50 UG/ML
INJECTION, SOLUTION INTRAMUSCULAR; INTRAVENOUS AS NEEDED
Status: DISCONTINUED | OUTPATIENT
Start: 2018-05-29 | End: 2018-05-29 | Stop reason: HOSPADM

## 2018-05-29 RX ORDER — SODIUM CHLORIDE 9 MG/ML
25 INJECTION, SOLUTION INTRAVENOUS CONTINUOUS
Status: DISCONTINUED | OUTPATIENT
Start: 2018-05-29 | End: 2018-05-29 | Stop reason: HOSPADM

## 2018-05-29 RX ORDER — OXYCODONE AND ACETAMINOPHEN 5; 325 MG/1; MG/1
1-2 TABLET ORAL
Qty: 40 TAB | Refills: 0 | Status: SHIPPED | OUTPATIENT
Start: 2018-05-29 | End: 2018-08-17

## 2018-05-29 RX ORDER — MIDAZOLAM HYDROCHLORIDE 1 MG/ML
1 INJECTION, SOLUTION INTRAMUSCULAR; INTRAVENOUS AS NEEDED
Status: DISCONTINUED | OUTPATIENT
Start: 2018-05-29 | End: 2018-05-29 | Stop reason: HOSPADM

## 2018-05-29 RX ORDER — DIPHENHYDRAMINE HYDROCHLORIDE 50 MG/ML
12.5 INJECTION, SOLUTION INTRAMUSCULAR; INTRAVENOUS AS NEEDED
Status: DISCONTINUED | OUTPATIENT
Start: 2018-05-29 | End: 2018-05-29 | Stop reason: HOSPADM

## 2018-05-29 RX ORDER — ROPIVACAINE HYDROCHLORIDE 5 MG/ML
30 INJECTION, SOLUTION EPIDURAL; INFILTRATION; PERINEURAL AS NEEDED
Status: DISCONTINUED | OUTPATIENT
Start: 2018-05-29 | End: 2018-05-29 | Stop reason: HOSPADM

## 2018-05-29 RX ORDER — SODIUM CHLORIDE, SODIUM LACTATE, POTASSIUM CHLORIDE, CALCIUM CHLORIDE 600; 310; 30; 20 MG/100ML; MG/100ML; MG/100ML; MG/100ML
100 INJECTION, SOLUTION INTRAVENOUS CONTINUOUS
Status: DISCONTINUED | OUTPATIENT
Start: 2018-05-29 | End: 2018-05-29 | Stop reason: HOSPADM

## 2018-05-29 RX ORDER — GLYCOPYRROLATE 0.2 MG/ML
INJECTION INTRAMUSCULAR; INTRAVENOUS AS NEEDED
Status: DISCONTINUED | OUTPATIENT
Start: 2018-05-29 | End: 2018-05-29 | Stop reason: HOSPADM

## 2018-05-29 RX ORDER — FENTANYL CITRATE 50 UG/ML
50 INJECTION, SOLUTION INTRAMUSCULAR; INTRAVENOUS AS NEEDED
Status: DISCONTINUED | OUTPATIENT
Start: 2018-05-29 | End: 2018-05-29 | Stop reason: HOSPADM

## 2018-05-29 RX ORDER — ROCURONIUM BROMIDE 10 MG/ML
INJECTION, SOLUTION INTRAVENOUS AS NEEDED
Status: DISCONTINUED | OUTPATIENT
Start: 2018-05-29 | End: 2018-05-29 | Stop reason: HOSPADM

## 2018-05-29 RX ORDER — LIDOCAINE HYDROCHLORIDE 10 MG/ML
0.1 INJECTION, SOLUTION EPIDURAL; INFILTRATION; INTRACAUDAL; PERINEURAL AS NEEDED
Status: DISCONTINUED | OUTPATIENT
Start: 2018-05-29 | End: 2018-05-29 | Stop reason: HOSPADM

## 2018-05-29 RX ORDER — ONDANSETRON 2 MG/ML
4 INJECTION INTRAMUSCULAR; INTRAVENOUS AS NEEDED
Status: DISCONTINUED | OUTPATIENT
Start: 2018-05-29 | End: 2018-05-29 | Stop reason: HOSPADM

## 2018-05-29 RX ORDER — ACETAMINOPHEN 10 MG/ML
INJECTION, SOLUTION INTRAVENOUS AS NEEDED
Status: DISCONTINUED | OUTPATIENT
Start: 2018-05-29 | End: 2018-05-29 | Stop reason: HOSPADM

## 2018-05-29 RX ORDER — SUCCINYLCHOLINE CHLORIDE 20 MG/ML
INJECTION INTRAMUSCULAR; INTRAVENOUS AS NEEDED
Status: DISCONTINUED | OUTPATIENT
Start: 2018-05-29 | End: 2018-05-29 | Stop reason: HOSPADM

## 2018-05-29 RX ORDER — SODIUM CHLORIDE 0.9 % (FLUSH) 0.9 %
5-10 SYRINGE (ML) INJECTION AS NEEDED
Status: DISCONTINUED | OUTPATIENT
Start: 2018-05-29 | End: 2018-05-29 | Stop reason: HOSPADM

## 2018-05-29 RX ORDER — NEOSTIGMINE METHYLSULFATE 1 MG/ML
INJECTION INTRAVENOUS AS NEEDED
Status: DISCONTINUED | OUTPATIENT
Start: 2018-05-29 | End: 2018-05-29 | Stop reason: HOSPADM

## 2018-05-29 RX ORDER — CEFAZOLIN SODIUM 2 G/50ML
2 SOLUTION INTRAVENOUS ONCE
Status: COMPLETED | OUTPATIENT
Start: 2018-05-29 | End: 2018-05-29

## 2018-05-29 RX ORDER — FENTANYL CITRATE 50 UG/ML
25 INJECTION, SOLUTION INTRAMUSCULAR; INTRAVENOUS
Status: COMPLETED | OUTPATIENT
Start: 2018-05-29 | End: 2018-05-29

## 2018-05-29 RX ORDER — MIDAZOLAM HYDROCHLORIDE 1 MG/ML
0.5 INJECTION, SOLUTION INTRAMUSCULAR; INTRAVENOUS
Status: DISCONTINUED | OUTPATIENT
Start: 2018-05-29 | End: 2018-05-29 | Stop reason: HOSPADM

## 2018-05-29 RX ORDER — SODIUM CHLORIDE, SODIUM LACTATE, POTASSIUM CHLORIDE, CALCIUM CHLORIDE 600; 310; 30; 20 MG/100ML; MG/100ML; MG/100ML; MG/100ML
25 INJECTION, SOLUTION INTRAVENOUS CONTINUOUS
Status: DISCONTINUED | OUTPATIENT
Start: 2018-05-29 | End: 2018-05-29 | Stop reason: HOSPADM

## 2018-05-29 RX ORDER — PHENYLEPHRINE HCL IN 0.9% NACL 0.4MG/10ML
SYRINGE (ML) INTRAVENOUS AS NEEDED
Status: DISCONTINUED | OUTPATIENT
Start: 2018-05-29 | End: 2018-05-29 | Stop reason: HOSPADM

## 2018-05-29 RX ORDER — ONDANSETRON 2 MG/ML
INJECTION INTRAMUSCULAR; INTRAVENOUS AS NEEDED
Status: DISCONTINUED | OUTPATIENT
Start: 2018-05-29 | End: 2018-05-29 | Stop reason: HOSPADM

## 2018-05-29 RX ORDER — OXYCODONE HYDROCHLORIDE 5 MG/1
5 TABLET ORAL AS NEEDED
Status: DISCONTINUED | OUTPATIENT
Start: 2018-05-29 | End: 2018-05-29 | Stop reason: HOSPADM

## 2018-05-29 RX ORDER — MIDAZOLAM HYDROCHLORIDE 1 MG/ML
INJECTION, SOLUTION INTRAMUSCULAR; INTRAVENOUS AS NEEDED
Status: DISCONTINUED | OUTPATIENT
Start: 2018-05-29 | End: 2018-05-29 | Stop reason: HOSPADM

## 2018-05-29 RX ORDER — LIDOCAINE HYDROCHLORIDE 20 MG/ML
INJECTION, SOLUTION EPIDURAL; INFILTRATION; INTRACAUDAL; PERINEURAL AS NEEDED
Status: DISCONTINUED | OUTPATIENT
Start: 2018-05-29 | End: 2018-05-29 | Stop reason: HOSPADM

## 2018-05-29 RX ORDER — PROPOFOL 10 MG/ML
INJECTION, EMULSION INTRAVENOUS AS NEEDED
Status: DISCONTINUED | OUTPATIENT
Start: 2018-05-29 | End: 2018-05-29 | Stop reason: HOSPADM

## 2018-05-29 RX ORDER — MORPHINE SULFATE 10 MG/ML
2 INJECTION, SOLUTION INTRAMUSCULAR; INTRAVENOUS
Status: DISCONTINUED | OUTPATIENT
Start: 2018-05-29 | End: 2018-05-29 | Stop reason: HOSPADM

## 2018-05-29 RX ORDER — BUPIVACAINE HYDROCHLORIDE AND EPINEPHRINE 5; 5 MG/ML; UG/ML
30 INJECTION, SOLUTION EPIDURAL; INTRACAUDAL; PERINEURAL ONCE
Status: COMPLETED | OUTPATIENT
Start: 2018-05-29 | End: 2018-05-29

## 2018-05-29 RX ORDER — EPHEDRINE SULFATE 50 MG/ML
INJECTION, SOLUTION INTRAVENOUS AS NEEDED
Status: DISCONTINUED | OUTPATIENT
Start: 2018-05-29 | End: 2018-05-29 | Stop reason: HOSPADM

## 2018-05-29 RX ORDER — SODIUM CHLORIDE 0.9 % (FLUSH) 0.9 %
5-10 SYRINGE (ML) INJECTION EVERY 8 HOURS
Status: DISCONTINUED | OUTPATIENT
Start: 2018-05-29 | End: 2018-05-29 | Stop reason: HOSPADM

## 2018-05-29 RX ADMIN — FENTANYL CITRATE 25 MCG: 50 INJECTION, SOLUTION INTRAMUSCULAR; INTRAVENOUS at 11:42

## 2018-05-29 RX ADMIN — ROCURONIUM BROMIDE 5 MG: 10 INJECTION, SOLUTION INTRAVENOUS at 09:01

## 2018-05-29 RX ADMIN — FENTANYL CITRATE 50 MCG: 50 INJECTION, SOLUTION INTRAMUSCULAR; INTRAVENOUS at 10:41

## 2018-05-29 RX ADMIN — CEFAZOLIN SODIUM 2 G: 2 SOLUTION INTRAVENOUS at 09:13

## 2018-05-29 RX ADMIN — ONDANSETRON 4 MG: 2 INJECTION INTRAMUSCULAR; INTRAVENOUS at 09:13

## 2018-05-29 RX ADMIN — GLYCOPYRROLATE 0.5 MG: 0.2 INJECTION INTRAMUSCULAR; INTRAVENOUS at 10:26

## 2018-05-29 RX ADMIN — SUCCINYLCHOLINE CHLORIDE 120 MG: 20 INJECTION INTRAMUSCULAR; INTRAVENOUS at 09:01

## 2018-05-29 RX ADMIN — ROCURONIUM BROMIDE 25 MG: 10 INJECTION, SOLUTION INTRAVENOUS at 09:06

## 2018-05-29 RX ADMIN — NEOSTIGMINE METHYLSULFATE 2.5 MG: 1 INJECTION INTRAVENOUS at 10:26

## 2018-05-29 RX ADMIN — PROPOFOL 150 MG: 10 INJECTION, EMULSION INTRAVENOUS at 09:01

## 2018-05-29 RX ADMIN — ROCURONIUM BROMIDE 10 MG: 10 INJECTION, SOLUTION INTRAVENOUS at 09:15

## 2018-05-29 RX ADMIN — LIDOCAINE HYDROCHLORIDE 60 MG: 20 INJECTION, SOLUTION EPIDURAL; INFILTRATION; INTRACAUDAL; PERINEURAL at 09:01

## 2018-05-29 RX ADMIN — MIDAZOLAM HYDROCHLORIDE 0.5 MG: 1 INJECTION, SOLUTION INTRAMUSCULAR; INTRAVENOUS at 10:50

## 2018-05-29 RX ADMIN — OXYCODONE HYDROCHLORIDE 5 MG: 5 TABLET ORAL at 12:00

## 2018-05-29 RX ADMIN — ROCURONIUM BROMIDE 10 MG: 10 INJECTION, SOLUTION INTRAVENOUS at 09:47

## 2018-05-29 RX ADMIN — SODIUM CHLORIDE, SODIUM LACTATE, POTASSIUM CHLORIDE, AND CALCIUM CHLORIDE 25 ML/HR: 600; 310; 30; 20 INJECTION, SOLUTION INTRAVENOUS at 08:13

## 2018-05-29 RX ADMIN — MIDAZOLAM HYDROCHLORIDE 2 MG: 1 INJECTION, SOLUTION INTRAMUSCULAR; INTRAVENOUS at 08:52

## 2018-05-29 RX ADMIN — HYDROMORPHONE HYDROCHLORIDE 0.5 MG: 1 INJECTION, SOLUTION INTRAMUSCULAR; INTRAVENOUS; SUBCUTANEOUS at 09:13

## 2018-05-29 RX ADMIN — Medication 40 MCG: at 09:30

## 2018-05-29 RX ADMIN — PROPOFOL 50 MG: 10 INJECTION, EMULSION INTRAVENOUS at 10:02

## 2018-05-29 RX ADMIN — DEXAMETHASONE SODIUM PHOSPHATE 8 MG: 4 INJECTION, SOLUTION INTRA-ARTICULAR; INTRALESIONAL; INTRAMUSCULAR; INTRAVENOUS; SOFT TISSUE at 09:13

## 2018-05-29 RX ADMIN — Medication 40 MCG: at 09:20

## 2018-05-29 RX ADMIN — FENTANYL CITRATE 25 MCG: 50 INJECTION, SOLUTION INTRAMUSCULAR; INTRAVENOUS at 11:17

## 2018-05-29 RX ADMIN — ACETAMINOPHEN 1000 MG: 10 INJECTION, SOLUTION INTRAVENOUS at 09:18

## 2018-05-29 RX ADMIN — EPHEDRINE SULFATE 10 MG: 50 INJECTION, SOLUTION INTRAVENOUS at 09:34

## 2018-05-29 RX ADMIN — FENTANYL CITRATE 25 MCG: 50 INJECTION, SOLUTION INTRAMUSCULAR; INTRAVENOUS at 11:02

## 2018-05-29 RX ADMIN — FENTANYL CITRATE 25 MCG: 50 INJECTION, SOLUTION INTRAMUSCULAR; INTRAVENOUS at 10:53

## 2018-05-29 RX ADMIN — FENTANYL CITRATE 100 MCG: 50 INJECTION, SOLUTION INTRAMUSCULAR; INTRAVENOUS at 09:01

## 2018-05-29 NOTE — H&P (VIEW-ONLY)
[employer] General Surgery History and Physical    History of Present Illness:      Meliza Duarte is a 34 y.o. female who has been having RUQ pain. She has been having RUQ abdominal pain for the past few months. The pain has been getting more frequent and more painful. The pain is a 5 of 10 when she has it. It seems to be mostly related to eating food and fatty foods. She says the pain will radiate to the R back as well. She has no other pain. She denies any nausea or vomiting. She also has a skin lesion on the back that has been present for about 7 years that she would like to have removed. Past Medical History:   Diagnosis Date    Asthma     Community acquired pneumonia     GERD (gastroesophageal reflux disease)     Headache(784.0)     Migraine    Lung nodule     Neurological disorder     migraines    Second hand smoke exposure        No past surgical history on file. Current Outpatient Prescriptions:     oxyCODONE-acetaminophen (PERCOCET) 5-325 mg per tablet, Take 1 Tab by mouth every four (4) hours as needed for Pain. Max Daily Amount: 6 Tabs., Disp: 10 Tab, Rfl: 0    ondansetron (ZOFRAN ODT) 4 mg disintegrating tablet, Take 1 Tab by mouth every eight (8) hours as needed for Nausea., Disp: 20 Tab, Rfl: 0    famotidine (PEPCID) 20 mg tablet, Take 1 Tab by mouth two (2) times a day for 10 days. , Disp: 20 Tab, Rfl: 0    No Known Allergies    Social History     Social History    Marital status: SINGLE     Spouse name: N/A    Number of children: N/A    Years of education: N/A     Occupational History    Not on file.      Social History Main Topics    Smoking status: Former Smoker     Packs/day: 1.00     Years: 5.00     Quit date: 7/20/2017    Smokeless tobacco: Never Used    Alcohol use No    Drug use: No      Comment: denies any marijuana    Sexual activity: Yes     Partners: Male     Birth control/ protection: Condom     Other Topics Concern    Not on file     Social History Narrative    ** Merged History Encounter **            Family History   Problem Relation Age of Onset    Diabetes Father     Heart Disease Father     Hypertension Father     Diabetes Mother     Hypertension Mother     Diabetes Paternal Grandmother        ROS   Constitutional: negative  Ears, Nose, Mouth, Throat, and Face: negative  Respiratory: negative  Cardiovascular: negative  Gastrointestinal: positive for nausea and abdominal pain  Genitourinary:negative  Integument/Breast: negative  Hematologic/Lymphatic: negative  Behavioral/Psychiatric: negative  Allergic/Immunologic: negative      Physical Exam:     Visit Vitals    /66    Pulse 62    Temp 99.4 °F (37.4 °C)    Resp 18    Ht 5' 1\" (1.549 m)    Wt 144 lb (65.3 kg)    SpO2 96%    BMI 27.21 kg/m2       General - alert and oriented, no apparent distress  HEENT - no jaundice, no hearing imparement  Pulm - CTAB, no C/W/R  CV - RRR, no M/R/G  Abd - soft, ND, BS present, mild TTP in RUQ, no hernia, no guarding  Ext - pulses intact in UE and LE bilaterally, no edema  Skin - supple, no rashes, 5mm skin lesion of the mid back with slightly raised appearance and slight dark round discoloration  Psychiatric - normal affect, good mood    Labs  Lab Results   Component Value Date/Time    Sodium 140 05/20/2018 07:29 PM    Potassium 3.5 05/20/2018 07:29 PM    Chloride 104 05/20/2018 07:29 PM    CO2 30 05/20/2018 07:29 PM    Anion gap 6 05/20/2018 07:29 PM    Glucose 89 05/20/2018 07:29 PM    BUN 11 05/20/2018 07:29 PM    Creatinine 0.94 05/20/2018 07:29 PM    BUN/Creatinine ratio 12 05/20/2018 07:29 PM    GFR est AA >60 05/20/2018 07:29 PM    GFR est non-AA >60 05/20/2018 07:29 PM    Calcium 9.8 05/20/2018 07:29 PM    Bilirubin, total 0.2 05/20/2018 07:29 PM    AST (SGOT) 13 (L) 05/20/2018 07:29 PM    Alk.  phosphatase 83 05/20/2018 07:29 PM    Protein, total 8.3 (H) 05/20/2018 07:29 PM    Albumin 4.1 05/20/2018 07:29 PM    Globulin 4.2 (H) 05/20/2018 07:29 PM    A-G Ratio 1.0 (L) 05/20/2018 07:29 PM    ALT (SGPT) 13 05/20/2018 07:29 PM     Lab Results   Component Value Date/Time    WBC 10.3 05/20/2018 07:29 PM    HGB 13.0 05/20/2018 07:29 PM    HCT 39.0 05/20/2018 07:29 PM    PLATELET 635 (H) 39/84/7101 07:29 PM    MCV 92.0 05/20/2018 07:29 PM         Imaging  RUQ US - LIVER: Normal echogenicity. No focal hepatic mass or intrahepatic biliary  dilatation is shown. GALLBLADDER: There is at least one large gallstone in the neck, as well as a  small volume of sludge. The distal gallbladder is collapsed. No pericholecystic  fluid. COMMON DUCT: 0. 5 cm in diameter. The duct is normal caliber. PANCREAS: The visualized portions of the pancreas are normal.   RIGHT KIDNEY: 10.8 cm in length. No hydronephrosis, shadowing calculus, or  contour-deforming renal mass.        IMPRESSION  IMPRESSION:   Gallstone(s) and sludge. I have reviewed and agree with all of the pertinent images    Assessment:     Mejia Lowe is a 34 y.o. female with symptomatic cholelithiasis and skin lesion of the back    Recommendations:     1. She will need laparoscopic cholecystectomy in the OR for her gallstones and pain. She also will need excision of the skin lesion in the OR as well. The skin lesion has been there for a few years but maybe getting a little darker and will excise in the OR. It does not have the appearance of a melanoma but I cannot be sure until removing it in the OR. I have discussed the above procedure with the patient in detail. We reviewed the benefits and possible complications of the surgery which include bleeding, infection, damage to adjacent organs, venous thromboembolism, need for repeat surgery, death and other unforseen complications. The patient agreed to proceed with the surgery. Cyndee Mackenzie MD    Ms. Terri Pratt has a reminder for a \"due or due soon\" health maintenance.  I have asked that she contact her primary care provider for follow-up on this health maintenance.

## 2018-05-29 NOTE — ROUTINE PROCESS
Patient: Shahbaz Quevedo MRN: [id number]  SSN: [id number]   YOB: 1990  Age: 34 y.o. Sex: female     Patient is status post Procedure(s):  LAPAROSCOPIC CHOLECYSTECTOMY  MASS EXCISION TRUNK.     Surgeon(s) and Role:     * Daniel Higgins MD - Primary    Local/Dose/Irrigation:  Marcaine 0.5% with Epi- 8 ml to back incision,  22 ml to trocar sites                  Peripheral IV 05/29/18 Left Wrist (Active)            Airway - Endotracheal Tube 05/29/18 Oral (Active)                   Dressing/Packing:  Wound Back-DRESSING TYPE: Topical skin adhesive/glue (05/29/18 0900)  Wound Abdomen-DRESSING TYPE: Topical skin adhesive/glue (05/29/18 1024)  Splint/Cast:  ]    Other:  Trocar sites x 4

## 2018-05-29 NOTE — ANESTHESIA PREPROCEDURE EVALUATION
Anesthetic History   No history of anesthetic complications            Review of Systems / Medical History  Patient summary reviewed, nursing notes reviewed and pertinent labs reviewed    Pulmonary            Asthma : well controlled       Neuro/Psych         Headaches     Cardiovascular  Within defined limits                Exercise tolerance: >4 METS     GI/Hepatic/Renal     GERD: well controlled           Endo/Other             Other Findings              Physical Exam    Airway  Mallampati: I  TM Distance: > 6 cm  Neck ROM: normal range of motion   Mouth opening: Normal     Cardiovascular  Regular rate and rhythm,  S1 and S2 normal,  no murmur, click, rub, or gallop  Rhythm: regular  Rate: normal         Dental    Dentition: Lower dentition intact and Upper dentition intact     Pulmonary  Breath sounds clear to auscultation               Abdominal  GI exam deferred       Other Findings            Anesthetic Plan    ASA: 2  Anesthesia type: general          Induction: Intravenous  Anesthetic plan and risks discussed with: Patient

## 2018-05-29 NOTE — BRIEF OP NOTE
BRIEF OPERATIVE NOTE    Date of Procedure: 5/29/2018   Preoperative Diagnosis: SYMPTOMATIC CHOLELITHIASIS AND SKIN LESION OF BACK  Postoperative Diagnosis: SYMPTOMATIC CHOLELITHIASIS AND SKIN LESION OF BACK    Procedure(s):  LAPAROSCOPIC CHOLECYSTECTOMY and EXCISIONAL BIOPSY 5x2cm OF SKIN LESION OF THE BACK  Surgeon(s) and Role:     * Marian Barthel, MD - Primary         Surgical Assistant: Manny Espana    Surgical Staff:  Circ-1: Kiesha Westbrook RN  Circ-Relief: [address], RN  Physician Assistant: FAWAD Becerra  Scrub Tech-1: Tasha Ronquillo  Event Time In   Incision Start 4505   Incision Close      Anesthesia: General   Estimated Blood Loss: 5cc  Specimens:   ID Type Source Tests Collected by Time Destination   1 : back skin lesion Fresh Back  Marian Barthel, MD 5/29/2018 4172 Pathology   2 : Lesa Fridge Fresh Abdomen  Marian Barthel, MD 5/29/2018 1003 Pathology      Findings: distended GB with large stone present, 5mm skin lesion excised with 5mm margin 5x2cm excision performed  Complications: none  Implants: * No implants in log *

## 2018-05-29 NOTE — ANESTHESIA POSTPROCEDURE EVALUATION
Post-Anesthesia Evaluation and Assessment    Patient: Lori Cook MRN: [id number]  SSN: [id number]    YOB: 1990  Age: 34 y.o. Sex: female       Cardiovascular Function/Vital Signs  Visit Vitals    /68    Pulse 82    Temp 36.9 °C (98.4 °F)    Resp 18    Ht 5' 1\" (1.549 m)    Wt 65.3 kg (144 lb)    SpO2 100%    BMI 27.21 kg/m2       Patient is status post general anesthesia for Procedure(s):  LAPAROSCOPIC CHOLECYSTECTOMY  MASS EXCISION TRUNK. Nausea/Vomiting: None    Postoperative hydration reviewed and adequate. Pain:  Pain Scale 1: Numeric (0 - 10) (05/29/18 1057)  Pain Intensity 1: 4 (05/29/18 1057)   Managed    Neurological Status:   Neuro (WDL): Within Defined Limits (05/29/18 1057)  Neuro  LUE Motor Response: Purposeful (05/29/18 1057)  LLE Motor Response: Purposeful (05/29/18 1057)  RUE Motor Response: Purposeful (05/29/18 1057)  RLE Motor Response: Purposeful (05/29/18 1057)   At baseline    Mental Status and Level of Consciousness: Arousable    Pulmonary Status:   O2 Device: Nasal cannula (05/29/18 1045)   Adequate oxygenation and airway patent    Complications related to anesthesia: None    Post-anesthesia assessment completed.  No concerns    Signed By: Virgil Ruiz MD     May 29, 2018

## 2018-05-29 NOTE — IP AVS SNAPSHOT
[facility] [facility] [address] [place] [address] 
[phone] Patient: Arias Pratt MRN: [id number] ZTO:[id number]/2/2208 About your hospitalization You were admitted on:  May 29, 2018 You last received care in the:  [facility] PACU You were discharged on:  May 29, 2018 Why you were hospitalized Your primary diagnosis was:  Not on File Follow-up Information Follow up With Details Comments Contact Info Prince Locke MD Schedule an appointment as soon as possible for a visit in 2 week(s) For wound re-check [address] [place] [address] [address] [place] [address] 
[phone] Your Scheduled Appointments Monday June 11, 2018 10:40 AM EDT  
POST OP with Soumya Cortes NP  
57 [facility] 990 ([facility]) 217 [facility] [address] [id number] [phone] Discharge Orders None A check jeanie indicates which time of day the medication should be taken. My Medications CHANGE how you take these medications Instructions Each Dose to Equal  
 Morning Noon Evening Bedtime  
 oxyCODONE-acetaminophen 5-325 mg per tablet Commonly known as:  PERCOCET What changed:   
- how much to take - Another medication with the same name was removed. Continue taking this medication, and follow the directions you see here. Your last dose was: Your next dose is: Take 1-2 Tabs by mouth every four (4) hours as needed for Pain. Max Daily Amount: 12 Tabs. 1-2 Tab CONTINUE taking these medications Instructions Each Dose to Equal  
 Morning Noon Evening Bedtime  
 famotidine 20 mg tablet Commonly known as:  PEPCID Your last dose was: Your next dose is: Take 1 Tab by mouth two (2) times a day for 10 days. 20 mg  
    
   
   
   
  
 ondansetron 4 mg disintegrating tablet Commonly known as:  ZOFRAN ODT  
   
 Your last dose was: Your next dose is: Take 1 Tab by mouth every eight (8) hours as needed for Nausea. 4 mg OTHER Your last dose was: Your next dose is: Take 2 Puffs by inhalation as needed (wheezing). 2 Puff Where to Get Your Medications Information on where to get these meds will be given to you by the nurse or doctor. ! Ask your nurse or doctor about these medications  
  oxyCODONE-acetaminophen 5-325 mg per tablet Opioid Education Prescription Opioids: What You Need to Know: 
 
Prescription opioids can be used to help relieve moderate-to-severe pain and are often prescribed following a surgery or injury, or for certain health conditions. These medications can be an important part of treatment but also come with serious risks. Opioids are strong pain medicines. Examples include hydrocodone, oxycodone, fentanyl, and morphine. Heroin is an example of an illegal opioid. It is important to work with your health care provider to make sure you are getting the safest, most effective care. WHAT ARE THE RISKS AND SIDE EFFECTS OF OPIOID USE? Prescription opioids carry serious risks of addiction and overdose, especially with prolonged use. An opioid overdose, often marked by slow breathing, can cause sudden death. The use of prescription opioids can have a number of side effects as well, even when taken as directed. · Tolerance-meaning you might need to take more of a medication for the same pain relief · Physical dependence-meaning you have symptoms of withdrawal when the medication is stopped. Withdrawal symptoms can include nausea, sweating, chills, diarrhea, stomach cramps, and muscle aches. Withdrawal can last up to several weeks, depending on which drug you took and how long you took it. · Increased sensitivity to pain · Constipation · Nausea, vomiting, and dry mouth · Sleepiness and dizziness · Confusion · Depression · Low levels of testosterone that can result in lower sex drive, energy, and strength · Itching and sweating RISKS ARE GREATER WITH:      
· History of drug misuse, substance use disorder, or overdose · Mental health conditions (such as depression or anxiety) · Sleep apnea · Older age (72 years or older) · Pregnancy Avoid alcohol while taking prescription opioids. Also, unless specifically advised by your health care provider, medications to avoid include: · Benzodiazepines (such as Xanax or Valium) · Muscle relaxants (such as Soma or Flexeril) · Hypnotics (such as Ambien or Lunesta) · Other prescription opioids KNOW YOUR OPTIONS Talk to your health care provider about ways to manage your pain that don't involve prescription opioids. Some of these options may actually work better and have fewer risks and side effects. Options may include: 
· Pain relievers such as acetaminophen, ibuprofen, and naproxen · Some medications that are also used for depression or seizures · Physical therapy and exercise · Counseling to help patients learn how to cope better with triggers of pain and stress. · Application of heat or cold compress · Massage therapy · Relaxation techniques Be Informed Make sure you know the name of your medication, how much and how often to take it, and its potential risks & side effects. IF YOU ARE PRESCRIBED OPIOIDS FOR PAIN: 
· Never take opioids in greater amounts or more often than prescribed. Remember the goal is not to be pain-free but to manage your pain at a tolerable level. · Follow up with your primary care provider to: · Work together to create a plan on how to manage your pain. · Talk about ways to help manage your pain that don't involve prescription opioids. · Talk about any and all concerns and side effects. · Help prevent misuse and abuse. · Never sell or share prescription opioids · Help prevent misuse and abuse. · Store prescription opioids in a secure place and out of reach of others (this may include visitors, children, friends, and family). · Safely dispose of unused/unwanted prescription opioids: Find your community drug take-back program or your pharmacy mail-back program, or flush them down the toilet, following guidance from the Food and Drug Administration (www.fda.gov/Drugs/ResourcesForYou). · Visit www.cdc.gov/drugoverdose to learn about the risks of opioid abuse and overdose. · If you believe you may be struggling with addiction, tell your health care provider and ask for guidance or call [employer] at 2-498-029-HHNZ. Discharge Instructions Laparoscopic cholecystectomy Patient Discharge Instructions Laurie Hernandez / [id number] : 1990 Admitted 2018 Discharged: 2018 PATIENT INSTRUCTIONS 
GALLBLADDER SURGERY 
(CHOLECYSTECTOMY) FOLLOW-UP:  Please make an appointment with your physician in 10 - 14 day(s). Call your physician immediately if you have any fevers greater than 101.5, drainage from your wound that is not clear or looks infected, persistent bleeding, increasing abdominal pain, problems urinating, or persistent nausea/vomiting. You should be aware that you may have right shoulder pain after surgery and that this will progressively go away. This is called 'referred pain' and is from the area of the gallbladder. It can also be caused by gas that may be trapped under the diaphragm from the surgery, especially if it was performed laparoscopically through mini-incisions. This gas will progressively get reabsorbed by your body. WOUND CARE INSTRUCTIONS:   You may shower at home. If clothing rubs against the wound or causes irritation and the wound is not draining you may cover it with a dry dressing during the daytime.   Try to keep the wound dry and avoid ointments on the wound unless directed to do so. If the wound becomes bright red and painful or starts to drain infected material that is not clear, please contact your physician immediately. You should also call if you begin to drain fluid that is thin and greenish-brown from the wound and appears to look like bile. If the wound though is mildly pink and has a thick firm ridge underneath it, this is normal, and is referred to as a healing ridge. This will resolve over the next 4-6 weeks. Place an ice pack on the navel incision for the next 48 hours. After that, you may use a heating pad if you feel muscle tightening or pulling. DIET:  You may eat any foods that you can tolerate. It is a good idea to eat a high fiber diet and take in plenty of fluids to prevent constipation. If you do become constipated you may want to take a mild laxative or take ducolax tablets on a daily basis until your bowel habits are regular. Constipation can be very uncomfortable, along with straining, after recent abdominal surgery. ACTIVITY:  You are encouraged to cough and deep breath or use your incentive spirometer if you were given one, every 15-30 minutes when awake. This will help prevent respiratory complications and low grade fevers post-operatively. You may want to hug a pillow when coughing and sneezing to add additional support to the surgical area(s) which will decrease pain during these times. You are encouraged to walk and engage in light activity for the next two weeks. You should not lift more than 20 pounds during this time frame as it could put you at increased risk for a post-operative hernia. Twenty pounds is roughly equivalent to a plastic bag of groceries. · Most people are able to return to work within 1 to 2 weeks after surgery. · You may shower 24 hours after surgery. Pat the cut (incision) dry. Do not take a bath for the first week. · Your doctor will tell you when you can have sex again. MEDICATIONS:  Try to take narcotic medications and anti-inflammatory medications, such as tylenol, ibuprofen, naprosyn, etc., with food. This will minimize stomach upset from the medication. Should you develop nausea and vomiting from the pain medication, or develop a rash, please discontinue the medication and contact your physician. You should not drive, make important decisions, or operate machinery when taking narcotic pain medication. · Take ibuprofen (Motrin) as scheduled then combine with oxycodone/acetaminophen (Percocet, Roxicet, Tylox) as needed for severe pain. QUESTIONS:  Please feel free to call Dr. May Ireland office ([phone]) if you have any questions, and they will be glad to assist you. Follow-up with Dr. Edu Clifford in 2 week(s). Call the office to schedule your appointment. Information obtained by : 
 
I understand that if any problems occur once I am at home I am to contact my physician. I understand and acknowledge receipt of the instructions indicated above. Physician's or R.N.'s Signature                                                                  Date/Time Patient or Representative Signature                                                          Date/Time Instructions Following Ambulatory Surgery Activity · As tolerated and directed by your doctor · Bathe or shower as directed by your doctor Diet · Clear liquids until no nausea or vomiting; then light diet for the first day · Advance to regular diet on second day, unless your doctor orders otherwise · If nausea and vomiting continues, call your doctor Pain · Take pain medication as directed by your doctor ·  Call your doctor if pain is NOT relieved by medication · DO NOT take aspirin or blood thinners until directed by your doctor Follow-Up Phone Calls · Will be made nursing staff · If you have any problems, call your doctor as needed Call your doctor if 
· Excessive bleeding that does not stop after holding mild pressure over the area · Temperature of 101 degrees F or above · Redness,excessive swelling or bruising, and/or green or yellow, smelly discharge from incision After Anesthesia · For the first 24 hours: DO NOT Drive, Drink alcoholic beverages, or Make important decisions · Be aware of dizziness following anesthesia and while taking pain medication Keep your surgical area clean You have dermabond on your incisions ( a surgical glue) and you may wash the site normally with soap and water and pat it dry. The dermabond will come off on its own, do not peel it off. Do not drink alcohol, drive a car,make important decisions or operate heavy machines while taking narcotic pain medication or for the next 24 hrs after anesthesia. You may resume a normal diet after tolerating the first bland meal. The first meal should be light,nothing spicy or greasy. Please ensure that you are able to empty your bladder within 6 hours of leaving the recovery room. If you are unable to empty your bladder, call your surgeon Introducing hospitals & HEALTH SERVICES! [employer] introduces [employer] patient portal. Now you can access parts of your medical record, email your doctor's office, and request medication refills online. 1. In your internet browser, go to [email or web address] 2. Click on the First Time User? Click Here link in the Sign In box. You will see the New Member Sign Up page. 3. Enter your [employer] Access Code exactly as it appears below.  You will not need to use this code after youve completed the sign-up process. If you do not sign up before the expiration date, you must request a new code. · GERS Access Code: [email or web address] Expires: 8/18/2018  7:21 PM 
 
4. Enter the last four digits of your Social Security Number (xxxx) and Date of Birth (mm/dd/yyyy) as indicated and click Submit. You will be taken to the next sign-up page. 5. Create a GERS ID. This will be your GERS login ID and cannot be changed, so think of one that is secure and easy to remember. 6. Create a GERS password. You can change your password at any time. 7. Enter your Password Reset Question and Answer. This can be used at a later time if you forget your password. 8. Enter your e-mail address. You will receive e-mail notification when new information is available in [address] 9. Click Sign Up. You can now view and download portions of your medical record. 10. Click the Download Summary menu link to download a portable copy of your medical information. If you have questions, please visit the Frequently Asked Questions section of the GERS website. Remember, GERS is NOT to be used for urgent needs. For medical emergencies, dial 911. Now available from your iPhone and Android! Introducing Jaren Nieto As a [employer] patient, I wanted to make you aware of our electronic visit tool called Jaren Nieto. [employer] 24/7 allows you to connect within minutes with a medical provider 24 hours a day, seven days a week via a mobile device or tablet or logging into a secure website from your computer. You can access Jaren Nieto from anywhere in the [place].  
 
A virtual visit might be right for you when you have a simple condition and feel like you just dont want to get out of bed, or cant get away from work for an appointment, when your regular [employer] provider is not available (evenings, weekends or holidays), or when youre out of town and need minor care. Electronic visits cost only $49 and if the [facility] 24/7 provider determines a prescription is needed to treat your condition, one can be electronically transmitted to a nearby pharmacy*. Please take a moment to enroll today if you have not already done so. The enrollment process is free and takes just a few minutes. To enroll, please download the [employer] 24/7 nidia to your tablet or phone, or visit [email or web address] to enroll on your computer. And, as an [address] [place] [address] patient with a [employer] account, the results of your visits will be scanned into your electronic medical record and your primary care provider will be able to view the scanned results. We urge you to continue to see your regular [facility] provider for your ongoing medical care. And while your primary care provider may not be the one available when you seek a Jaren Nieto virtual visit, the peace of mind you get from getting a real diagnosis real time can be priceless. For more information on Jaren IMTtara, view our Frequently Asked Questions (FAQs) at [email or web address]. Sincerely, 
 
Fariba Elizabeth MD 
Chief Medical Officer [address] *:  certain medications cannot be prescribed via Jaren IMTtara Providers Seen During Your Hospitalization Provider Specialty Primary office phone Daniel No, [address] [facility] [phone] Your Primary Care Physician (PCP) Primary Care Physician Office Phone Office Fax [address] [facility], [facility] [address] [phone] You are allergic to the following No active allergies Recent Documentation Height Weight BMI OB Status Smoking Status 1.549 m 65.3 kg 27.21 kg/m2 Having regular periods Former Smoker Emergency Contacts Name Discharge Info Relation Home Work Mobile Urmila Lebron DISCHARGE CAREGIVER [3] Parent [1] [phone] Patient Belongings The following personal items are in your possession at time of discharge: 
  Dental Appliances: None Discharge Instructions Attachments/References MEFS - OXYCODONE/ACETAMINOPHEN (PERCOCET, ROXICET) - (BY MOUTH) (ENGLISH) Patient Handouts Oxycodone/Acetaminophen (Percocet, Roxicet) - (By mouth) Why this medicine is used:  
Treats pain. This medicine contains a narcotic pain reliever. Contact a nurse or doctor right away if you have: 
· Extreme weakness, shallow breathing, slow heartbeat · Sweating or cold, clammy skin · Skin blisters, rash, or peeling Common side effects: 
· Constipation · Nausea, vomiting · Tiredness © 2017 [address] Information is for End User's use only and may not be sold, redistributed or otherwise used for commercial purposes. Please provide this summary of care documentation to your next provider. Signatures-by signing, you are acknowledging that this After Visit Summary has been reviewed with you and you have received a copy. Patient Signature:  ____________________________________________________________ Date:  ____________________________________________________________  
  
Tres Evans Provider Signature:  ____________________________________________________________ Date:  ____________________________________________________________

## 2018-05-29 NOTE — OP NOTES
[address]  OPERATIVE REPORT    Taylor Callejas  MR#: [id number]  : 1990  ACCOUNT #: [de-identified]   DATE OF SERVICE: 2018    PREOPERATIVE DIAGNOSES:  Symptomatic cholelithiasis and skin lesion of the back. POSTOPERATIVE DIAGNOSIS:  Symptomatic cholelithiasis and skin lesion of the back. PROCEDURES PERFORMED:  Laparoscopic cholecystectomy with excisional biopsy 5 x 2 cm of skin lesion of the back. SURGEON:  Cyndee Mackenzie MD     SURGICAL ASSISTANT:  Orelia Hatchet, PA    INDICATION FOR THE OPERATION:  The patient is a 70-year-old female who has been having right upper quadrant abdominal pain, had a large gallstone in the neck of the gallbladder and is needing a laparoscopic cholecystectomy. She also has a 5 mm nodule or nodular skin lesion of the right back that has been present for a number of years and this needing to be excised in the operating room. DESCRIPTION OF OPERATION:  The patient was met in the preop holding area, the H and P was updated. Consent was signed. All risks and benefits were explained to the patient prior to the start of the operation. She was taken back to the operating room. She was laying in the supine position. The right side of the back was prepped and draped in standard sterile fashion. She was placed into a decubitus position with the right side up. After the area was prepped and draped, we then made a 5 x 2 cm elliptical incision around the back mass taking about a 5 mm margin around it. We dissected through the subcutaneous tissue with the scalpel and Bovie cautery. We dissected underneath the dermal layer down to the fatty soft tissue layer. We dissected into the fatty soft tissue layer and then dissected underneath the skin lesion and then disconnected it from the skin and passed it off the field as back skin lesion. We then irrigated the wound with saline irrigation. The wound was cauterized. There was no bleeding.   It was hemostatic and then closed the deep soft tissue, deep dermal layer with multiple interrupted 2-0 Vicryl sutures in interrupted fashion and then closed the dermis with a running 4-0 Monocryl and Dermabond to complete the back mass excision operation. Once the Dermabond was dry, we flipped her back over supine and then prepped and draped the abdomen in standard sterile fashion. We had previously done the timeout. We then made a 5 mm incision into the right upper quadrant, inserting a Visiport trocar to the intra-abdominal cavity, insufflating to 15 mmHg. We then placed a 12 mm periumbilical trocar, 5 mm subxiphoid trocar and a 5 mm right-sided trocar. All the trocars were placed. There was no injury during the trocar placement. We then grasped the gallbladder, pushed it up and over the liver. We then dissected down to the infundibulum of the gallbladder, dissecting free the cystic duct and cystic artery, identifying both structures clearly going into the gallbladder with no intervening structures in between. Our critical view of safety had been obtained. We could clearly see the common bile duct and the junction of the cystic and the common bile duct. We then placed 3 clips on the cystic duct on the patient's side, 1 on the distal side and cut in between. We then placed 2 clips on the cystic artery on the patient's side, 1 on the distal side and cut in between the gallbladder from the gallbladder fossa with a hook cautery, cauterizing any bleeding from the liver bed along the way. Gallbladder fossa was hemostatic. We then removed the gallbladder from a 12 mm EndoCatch bag and then inspected the rest of the liver, which was hemostatic and closed the 12 mm periumbilical trocar site fascial defect with an EndoClose suture passing device in an interrupted figure-of-eight fashion.   We then desufflated the abdominal cavity, removed the trocars and closed the skin with 4-0 Monocryl and Dermabond to complete the operation. Dr. Bob Umanzor was present and scrubbed during the entire operation. The counts were correct. ANESTHESIA:  General.    ESTIMATED BLOOD LOSS:  5 mL. SPECIMENS REMOVED:     1.  Back lesion. 2.  Gallbladder. FINDINGS:  Distended gallbladder with large stone present, 5 mm skin lesion excised with a 5 mm margin measuring 5 x 2 cm of skin excised. COMPLICATIONS:  None. IMPLANTS:  None.       MD KENA Corbett / WILLI  D: 05/29/2018 10:33     T: 05/29/2018 11:14  JOB #: [id number]

## 2018-05-29 NOTE — INTERVAL H&P NOTE
H&P Update:  Danelel Pressley was seen and examined. History and physical has been reviewed. The patient has been examined. There have been no significant clinical changes since the completion of the originally dated History and Physical.  Patient identified by surgeon; surgical site was confirmed by patient and surgeon.     Signed By: Jose Mancilla MD     May 29, 2018 8:14 AM

## 2018-05-29 NOTE — DISCHARGE INSTRUCTIONS
Laparoscopic cholecystectomy      Patient Discharge Instructions    Pat Parish / [id number] : 1990    Admitted 2018 Discharged: 2018       PATIENT INSTRUCTIONS  GALLBLADDER SURGERY  (CHOLECYSTECTOMY)    FOLLOW-UP:  Please make an appointment with your physician in 10 - 14 day(s). Call your physician immediately if you have any fevers greater than 101.5, drainage from your wound that is not clear or looks infected, persistent bleeding, increasing abdominal pain, problems urinating, or persistent nausea/vomiting. You should be aware that you may have right shoulder pain after surgery and that this will progressively go away. This is called 'referred pain' and is from the area of the gallbladder. It can also be caused by gas that may be trapped under the diaphragm from the surgery, especially if it was performed laparoscopically through mini-incisions. This gas will progressively get reabsorbed by your body. WOUND CARE INSTRUCTIONS:   You may shower at home. If clothing rubs against the wound or causes irritation and the wound is not draining you may cover it with a dry dressing during the daytime. Try to keep the wound dry and avoid ointments on the wound unless directed to do so. If the wound becomes bright red and painful or starts to drain infected material that is not clear, please contact your physician immediately. You should also call if you begin to drain fluid that is thin and greenish-brown from the wound and appears to look like bile. If the wound though is mildly pink and has a thick firm ridge underneath it, this is normal, and is referred to as a healing ridge. This will resolve over the next 4-6 weeks. Place an ice pack on the navel incision for the next 48 hours. After that, you may use a heating pad if you feel muscle tightening or pulling. DIET:  You may eat any foods that you can tolerate.   It is a good idea to eat a high fiber diet and take in plenty of fluids to prevent constipation. If you do become constipated you may want to take a mild laxative or take ducolax tablets on a daily basis until your bowel habits are regular. Constipation can be very uncomfortable, along with straining, after recent abdominal surgery. ACTIVITY:  You are encouraged to cough and deep breath or use your incentive spirometer if you were given one, every 15-30 minutes when awake. This will help prevent respiratory complications and low grade fevers post-operatively. You may want to hug a pillow when coughing and sneezing to add additional support to the surgical area(s) which will decrease pain during these times. You are encouraged to walk and engage in light activity for the next two weeks. You should not lift more than 20 pounds during this time frame as it could put you at increased risk for a post-operative hernia. Twenty pounds is roughly equivalent to a plastic bag of groceries. · Most people are able to return to work within 1 to 2 weeks after surgery. · You may shower 24 hours after surgery. Pat the cut (incision) dry. Do not take a bath for the first week. · Your doctor will tell you when you can have sex again. MEDICATIONS:  Try to take narcotic medications and anti-inflammatory medications, such as tylenol, ibuprofen, naprosyn, etc., with food. This will minimize stomach upset from the medication. Should you develop nausea and vomiting from the pain medication, or develop a rash, please discontinue the medication and contact your physician. You should not drive, make important decisions, or operate machinery when taking narcotic pain medication. · Take ibuprofen (Motrin) as scheduled then combine with oxycodone/acetaminophen (Percocet, Roxicet, Tylox) as needed for severe pain. QUESTIONS:  Please feel free to call Dr. John Fore office ([phone]) if you have any questions, and they will be glad to assist you. Follow-up with Dr. Bob Umanzor in 2 week(s).  Call the office to schedule your appointment. Information obtained by :    I understand that if any problems occur once I am at home I am to contact my physician. I understand and acknowledge receipt of the instructions indicated above.                                                                                                                                            Physician's or R.N.'s Signature                                                                  Date/Time                                                                                                                                              Patient or Representative Signature                                                          Date/Time       Instructions Following Ambulatory Surgery    Activity  · As tolerated and directed by your doctor  · Bathe or shower as directed by your doctor    Diet  · Clear liquids until no nausea or vomiting; then light diet for the first day  · Advance to regular diet on second day, unless your doctor orders otherwise  · If nausea and vomiting continues, call your doctor    Pain  · Take pain medication as directed by your doctor  ·  Call your doctor if pain is NOT relieved by medication  · DO NOT take aspirin or blood thinners until directed by your doctor        Follow-Up Phone Calls  · Will be made nursing staff  · If you have any problems, call your doctor as needed    Call your doctor if  · Excessive bleeding that does not stop after holding mild pressure over the area  · Temperature of 101 degrees F or above  · Redness,excessive swelling or bruising, and/or green or yellow, smelly discharge from incision    After Anesthesia  · For the first 24 hours: DO NOT Drive, Drink alcoholic beverages, or Make important decisions  · Be aware of dizziness following anesthesia and while taking pain medication    Keep your surgical area clean  You have dermabond on your incisions ( a surgical glue) and you may wash the site normally with soap and water and pat it dry. The dermabond will come off on its own, do not peel it off. Do not drink alcohol, drive a car,make important decisions or operate heavy machines while taking narcotic pain medication or for the next 24 hrs after anesthesia. You may resume a normal diet after tolerating the first bland meal. The first meal should be light,nothing spicy or greasy. Please ensure that you are able to empty your bladder within 6 hours of leaving the recovery room.  If you are unable to empty your bladder, call your surgeon

## 2018-06-11 ENCOUNTER — OFFICE VISIT (OUTPATIENT)
Dept: SURGERY | Age: 28
End: 2018-06-11

## 2018-06-11 VITALS
HEART RATE: 58 BPM | WEIGHT: 144 LBS | SYSTOLIC BLOOD PRESSURE: 118 MMHG | HEIGHT: 61 IN | RESPIRATION RATE: 16 BRPM | OXYGEN SATURATION: 97 % | TEMPERATURE: 97.9 F | BODY MASS INDEX: 27.19 KG/M2 | DIASTOLIC BLOOD PRESSURE: 78 MMHG

## 2018-06-11 DIAGNOSIS — Z09 POSTOPERATIVE EXAMINATION: Primary | ICD-10-CM

## 2018-06-11 NOTE — PROGRESS NOTES
Subjective:      Mejia Lowe is a 34 y.o. female presents for postop care 13 days following laparoscopic cholecystectomy with excisional biopsy 5 x 2 cm of skin lesion of the back by Dr. Shilpi Dave. Appetite is good. Eating a regular diet. without difficulty. Bowel movements are regular. Pain is controlled without any medications. .Denies fever, nausea, shortness of breath, chest pain, redness at incision site, vomiting and diarrhea. 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 Said she can return to work without the heavy lifting. Advance directive not on file      Objective:     Visit Vitals    /78 (BP 1 Location: Left arm, BP Patient Position: Sitting)    Pulse (!) 58    Temp 97.9 °F (36.6 °C) (Oral)    Resp 16    Ht 5' 1\" (1.549 m)    Wt 144 lb (65.3 kg)    SpO2 97%    BMI 27.21 kg/m2         General:  alert, no distress   Abdomen: soft, bowel sounds active, non-tender   Incision:   healing well, no drainage, no erythema, no seroma, no swelling, no dehiscence, incisions well approximated   Heart: regular rate and rhythm, S1, S2 normal, no murmur, click, rub or gallop   Lungs: clear to auscultation bilaterally     Pathology:    1. Skin, back, excisional biopsy:   Deep dermatofibroma, present at the peripheral margin   See comment   2. Gallbladder, cholecystectomy:   Chronic cholecystitis and cholelithiasis     Assessment:     1. Chronic cholecystitis with cholelithiasis, status post lap jung. Dermatofibroma  Doing well postoperatively. Plan:     1. Pt is to increase activities as tolerated. May return to week but no heavy lifting for another 2 weeks. 2. Follow-up: PRN. Pt to call back to further discuss dermatofibroma with Dr. Shilpi Dave. Re-excise? 3. Low fat diet for another 2 weeks. Ms. Terri Pratt has a reminder for a \"due or due soon\" health maintenance. I have asked that she contact her primary care provider for follow-up on this health maintenance.     Patient verbalized understanding and agreement.

## 2018-06-11 NOTE — MR AVS SNAPSHOT
[address] [facility] [address] [id number] [phone] Patient: Laurie Hernandez MRN: [id number] MOT:[birth year] Visit Information Date & Time Provider Department Dept. Phone Encounter #  
 6/11/2018 10:40 AM Audree Paget, NP [phone] [id number] Upcoming Health Maintenance Date Due  
 HPV Age 9Y-34Y (1 of 3 - Female 3 Dose Series) 3/3/2003 Pneumococcal 19-64 Medium Risk (1 of 1 - PPSV23) 3/3/2011 DTaP/Tdap/Td series (1 - Tdap) 3/3/2013 PAP AKA CERVICAL CYTOLOGY 3/3/2013 Influenza Age 5 to Adult 8/1/2018 Allergies as of 6/11/2018  Review Complete On: 6/11/2018 By: Audree Paget, NP No Known Allergies Current Immunizations  Never Reviewed No immunizations on file. Not reviewed this visit You Were Diagnosed With   
  
 Codes Comments Postoperative examination    -  Primary ICD-10-CM: M79 ICD-9-CM: V67.00 Vitals BP Pulse Temp Resp Height(growth percentile) Weight(growth percentile) 118/78 (BP 1 Location: Left arm, BP Patient Position: Sitting) (!) 58 97.9 °F (36.6 °C) (Oral) 16 5' 1\" (1.549 m) 144 lb (65.3 kg) SpO2 BMI OB Status Smoking Status 97% 27.21 kg/m2 Having regular periods Former Smoker Vitals History BMI and BSA Data Body Mass Index Body Surface Area  
 27.21 kg/m 2 1.68 m 2 Preferred Pharmacy Pharmacy Name Phone [facility], [address] [place] [address] AT [facility] [facility] [address] [phone] Your Updated Medication List  
  
   
This list is accurate as of 6/11/18 11:10 AM.  Always use your most recent med list.  
  
  
  
  
 ondansetron 4 mg disintegrating tablet Commonly known as:  ZOFRAN ODT Take 1 Tab by mouth every eight (8) hours as needed for Nausea. OTHER Take 2 Puffs by inhalation as needed (wheezing). oxyCODONE-acetaminophen 5-325 mg per tablet Commonly known as:  PERCOCET Take 1-2 Tabs by mouth every four (4) hours as needed for Pain. Max Daily Amount: 12 Tabs. Patient Instructions   
dermatofibroma Cholecystectomy: What to Expect at [facility] Your Recovery After your surgery, it is normal to feel weak and tired for several days after you return home. Your belly may be swollen. If you had laparoscopic surgery, you may also have pain in your shoulder for about 24 hours. You may have gas or need to burp a lot at first, and a few people get diarrhea. The diarrhea usually goes away in 2 to 4 weeks, but it may last longer. How quickly you recover depends on whether you had a laparoscopic or open surgery. · For a laparoscopic surgery, most people can go back to work or their normal routine in 1 to 2 weeks, but it may take longer, depending on the type of work you do. · For an open surgery, it will probably take 4 to 6 weeks before you get back to your normal routine. This care sheet gives you a general idea about how long it will take for you to recover. However, each person recovers at a different pace. Follow the steps below to get better as quickly as possible. How can you care for yourself at home? Activity ? · Rest when you feel tired. Getting enough sleep will help you recover. ? · Try to walk each day. Start out by walking a little more than you did the day before. Gradually increase the amount you walk. Walking boosts blood flow and helps prevent pneumonia and constipation. ? · For about 2 to 4 weeks, avoid lifting anything that would make you strain. This may include a child, heavy grocery bags and milk containers, a heavy briefcase or backpack, cat litter or dog food bags, or a vacuum . ? · Avoid strenuous activities, such as biking, jogging, weightlifting, and aerobic exercise, until your doctor says it is okay. ? · You may shower 24 to 48 hours after surgery, if your doctor okays it. Pat the cut (incision) dry. Do not take a bath for the first 2 weeks, or until your doctor tells you it is okay. ? · You may drive when you are no longer taking pain medicine and can quickly move your foot from the gas pedal to the brake. You must also be able to sit comfortably for a long period of time, even if you do not plan to go far. You might get caught in traffic. ? · For a laparoscopic surgery, most people can go back to work or their normal routine in 1 to 2 weeks, but it may take longer. For an open surgery, it will probably take 4 to 6 weeks before you get back to your normal routine. ? · Your doctor will tell you when you can have sex again. ? Diet ? · Eat smaller meals more often instead of fewer larger meals. You can eat a normal diet, but avoid eating fatty foods for about 1 month. Fatty foods include hamburger, whole milk, cheese, and many snack foods. If your stomach is upset, try bland, low-fat foods like plain rice, broiled chicken, toast, and yogurt. ? · Drink plenty of fluids (unless your doctor tells you not to). ? · If you have diarrhea, try avoiding spicy foods, dairy products, fatty foods, and alcohol. You can also watch to see if specific foods cause it, and stop eating them. If the diarrhea continues for more than 2 weeks, talk to your doctor. ? · You may notice that your bowel movements are not regular right after your surgery. This is common. Try to avoid constipation and straining with bowel movements. You may want to take a fiber supplement every day. If you have not had a bowel movement after a couple of days, ask your doctor about taking a mild laxative. Medicines ? · Your doctor will tell you if and when you can restart your medicines. He or she will also give you instructions about taking any new medicines.   
? · If you take blood thinners, such as warfarin (Coumadin), clopidogrel (Plavix), or aspirin, be sure to talk to your doctor. He or she will tell you if and when to start taking those medicines again. Make sure that you understand exactly what your doctor wants you to do. ? · Take pain medicines exactly as directed. ¨ If the doctor gave you a prescription medicine for pain, take it as prescribed. ¨ If you are not taking a prescription pain medicine, take an over-the-counter medicine such as acetaminophen (Tylenol), ibuprofen (Advil, Motrin), or naproxen (Aleve). Read and follow all instructions on the label. ¨ Do not take two or more pain medicines at the same time unless the doctor told you to. Many pain medicines contain acetaminophen, which is Tylenol. Too much Tylenol can be harmful. ? · If you think your pain medicine is making you sick to your stomach: 
¨ Take your medicine after meals (unless your doctor tells you not to). ¨ Ask your doctor for a different pain medicine. ? · If your doctor prescribed antibiotics, take them as directed. Do not stop taking them just because you feel better. You need to take the full course of antibiotics. Incision care ? · If you have strips of tape on the incision, or cut, leave the tape on for a week or until it falls off.  
? · After 24 to 48 hours, wash the area daily with warm, soapy water, and pat it dry. ? · You may have staples to hold the cut together. Keep them dry until your doctor takes them out. This is usually in 7 to 10 days. ? · Keep the area clean and dry. You may cover it with a gauze bandage if it weeps or rubs against clothing. Change the bandage every day. ?Ice ? · To reduce swelling and pain, put ice or a cold pack on your belly for 10 to 20 minutes at a time. Do this every 1 to 2 hours. Put a thin cloth between the ice and your skin. Follow-up care is a key part of your treatment and safety.  Be sure to make and go to all appointments, and call your doctor if you are having problems. It's also a good idea to know your test results and keep a list of the medicines you take. When should you call for help? Call 911 anytime you think you may need emergency care. For example, call if: 
? · You passed out (lost consciousness). ? · You are short of breath. [place] ? Call your doctor now or seek immediate medical care if: 
? · You are sick to your stomach and cannot drink fluids. ? · You have pain that does not get better when you take your pain medicine. ? · You cannot pass stools or gas. ? · You have signs of infection, such as: 
¨ Increased pain, swelling, warmth, or redness. ¨ Red streaks leading from the incision. ¨ Pus draining from the incision. ¨ A fever. ? · Bright red blood has soaked through the bandage over your incision. ? · You have loose stitches, or your incision comes open. ? · You have signs of a blood clot in your leg (called a deep vein thrombosis), such as: 
¨ Pain in your calf, back of knee, thigh, or groin. ¨ Redness and swelling in your leg or groin. ? Watch closely for any changes in your health, and be sure to contact your doctor if you have any problems. Where can you learn more? Go to http://yulia-amilcar.info/. Enter [id number] [id number] in the search box to learn more about \"Cholecystectomy: What to Expect at Home. \" Current as of: May 12, 2017 Content Version: 11.4 © 3496-6695 [employer]. Care instructions adapted under license by [employer] (which disclaims liability or warranty for this information). If you have questions about a medical condition or this instruction, always ask your healthcare professional. [place] [address] any warranty or liability for your use of this information. Introducing \[facility]\"" & HEALTH SERVICES! Dear Hal Gibbons: 
Thank you for requesting a [employer] account. Our records indicate that you already have an active [employer] account.   You can access your account anytime at [email or web address] Did you know that you can access your hospital and ER discharge instructions at any time in [email or web address]? You can also review all of your test results from your hospital stay or ER visit. Additional Information If you have questions, please visit the Frequently Asked Questions section of the [email or web address] website at [email or web address] Remember, [email or web address] is NOT to be used for urgent needs. For medical emergencies, dial 911. Now available from your iPhone and Android! Please provide this summary of care documentation to your next provider. Your primary care clinician is listed as [facility] Portal. If you have any questions after today's visit, please call [phone].

## 2018-06-11 NOTE — PATIENT INSTRUCTIONS
dermatofibroma     Cholecystectomy: What to Expect at [address] [place] [address]  After your surgery, it is normal to feel weak and tired for several days after you return home. Your belly may be swollen. If you had laparoscopic surgery, you may also have pain in your shoulder for about 24 hours. You may have gas or need to burp a lot at first, and a few people get diarrhea. The diarrhea usually goes away in 2 to 4 weeks, but it may last longer. How quickly you recover depends on whether you had a laparoscopic or open surgery. · For a laparoscopic surgery, most people can go back to work or their normal routine in 1 to 2 weeks, but it may take longer, depending on the type of work you do. · For an open surgery, it will probably take 4 to 6 weeks before you get back to your normal routine. This care sheet gives you a general idea about how long it will take for you to recover. However, each person recovers at a different pace. Follow the steps below to get better as quickly as possible. How can you care for yourself at home? Activity  ? · Rest when you feel tired. Getting enough sleep will help you recover. ? · Try to walk each day. Start out by walking a little more than you did the day before. Gradually increase the amount you walk. Walking boosts blood flow and helps prevent pneumonia and constipation. ? · For about 2 to 4 weeks, avoid lifting anything that would make you strain. This may include a child, heavy grocery bags and milk containers, a heavy briefcase or backpack, cat litter or dog food bags, or a vacuum . ? · Avoid strenuous activities, such as biking, jogging, weightlifting, and aerobic exercise, until your doctor says it is okay. ? · You may shower 24 to 48 hours after surgery, if your doctor okays it. Pat the cut (incision) dry. Do not take a bath for the first 2 weeks, or until your doctor tells you it is okay.    ? · You may drive when you are no longer taking pain medicine and can quickly move your foot from the gas pedal to the brake. You must also be able to sit comfortably for a long period of time, even if you do not plan to go far. You might get caught in traffic. ? · For a laparoscopic surgery, most people can go back to work or their normal routine in 1 to 2 weeks, but it may take longer. For an open surgery, it will probably take 4 to 6 weeks before you get back to your normal routine. ? · Your doctor will tell you when you can have sex again. ? Diet  ? · Eat smaller meals more often instead of fewer larger meals. You can eat a normal diet, but avoid eating fatty foods for about 1 month. Fatty foods include hamburger, whole milk, cheese, and many snack foods. If your stomach is upset, try bland, low-fat foods like plain rice, broiled chicken, toast, and yogurt. ? · Drink plenty of fluids (unless your doctor tells you not to). ? · If you have diarrhea, try avoiding spicy foods, dairy products, fatty foods, and alcohol. You can also watch to see if specific foods cause it, and stop eating them. If the diarrhea continues for more than 2 weeks, talk to your doctor. ? · You may notice that your bowel movements are not regular right after your surgery. This is common. Try to avoid constipation and straining with bowel movements. You may want to take a fiber supplement every day. If you have not had a bowel movement after a couple of days, ask your doctor about taking a mild laxative. Medicines  ? · Your doctor will tell you if and when you can restart your medicines. He or she will also give you instructions about taking any new medicines. ? · If you take blood thinners, such as warfarin (Coumadin), clopidogrel (Plavix), or aspirin, be sure to talk to your doctor. He or she will tell you if and when to start taking those medicines again. Make sure that you understand exactly what your doctor wants you to do. ? · Take pain medicines exactly as directed.   ¨ If the doctor gave you a prescription medicine for pain, take it as prescribed. ¨ If you are not taking a prescription pain medicine, take an over-the-counter medicine such as acetaminophen (Tylenol), ibuprofen (Advil, Motrin), or naproxen (Aleve). Read and follow all instructions on the label. ¨ Do not take two or more pain medicines at the same time unless the doctor told you to. Many pain medicines contain acetaminophen, which is Tylenol. Too much Tylenol can be harmful. ? · If you think your pain medicine is making you sick to your stomach:  ¨ Take your medicine after meals (unless your doctor tells you not to). ¨ Ask your doctor for a different pain medicine. ? · If your doctor prescribed antibiotics, take them as directed. Do not stop taking them just because you feel better. You need to take the full course of antibiotics. Incision care  ? · If you have strips of tape on the incision, or cut, leave the tape on for a week or until it falls off.   ? · After 24 to 48 hours, wash the area daily with warm, soapy water, and pat it dry. ? · You may have staples to hold the cut together. Keep them dry until your doctor takes them out. This is usually in 7 to 10 days. ? · Keep the area clean and dry. You may cover it with a gauze bandage if it weeps or rubs against clothing. Change the bandage every day. ?Ice  ? · To reduce swelling and pain, put ice or a cold pack on your belly for 10 to 20 minutes at a time. Do this every 1 to 2 hours. Put a thin cloth between the ice and your skin. Follow-up care is a key part of your treatment and safety. Be sure to make and go to all appointments, and call your doctor if you are having problems. It's also a good idea to know your test results and keep a list of the medicines you take. When should you call for help? Call 911 anytime you think you may need emergency care. For example, call if:  ? · You passed out (lost consciousness). ? · You are short of breath. Terri Belch ? Call your doctor now or seek immediate medical care if:  ? · You are sick to your stomach and cannot drink fluids. ? · You have pain that does not get better when you take your pain medicine. ? · You cannot pass stools or gas. ? · You have signs of infection, such as:  ¨ Increased pain, swelling, warmth, or redness. ¨ Red streaks leading from the incision. ¨ Pus draining from the incision. ¨ A fever. ? · Bright red blood has soaked through the bandage over your incision. ? · You have loose stitches, or your incision comes open. ? · You have signs of a blood clot in your leg (called a deep vein thrombosis), such as:  ¨ Pain in your calf, back of knee, thigh, or groin. ¨ Redness and swelling in your leg or groin. ? Watch closely for any changes in your health, and be sure to contact your doctor if you have any problems. Where can you learn more? Go to http://yulia-amilcar.info/. Enter [id number] [id number] in the search box to learn more about \"Cholecystectomy: What to Expect at Home. \"  Current as of: May 12, 2017  Content Version: 11.4  © 5231-9896 [employer], [employer]. Care instructions adapted under license by [employer] (which disclaims liability or warranty for this information). If you have questions about a medical condition or this instruction, always ask your healthcare professional. [address] any warranty or liability for your use of this information.

## 2018-08-17 ENCOUNTER — HOSPITAL ENCOUNTER (EMERGENCY)
Age: 28
Discharge: HOME | End: 2018-08-17
Attending: EMERGENCY MEDICINE
Payer: SUBSIDIZED

## 2018-08-17 VITALS
TEMPERATURE: 98.1 F | HEART RATE: 73 BPM | WEIGHT: 156.31 LBS | OXYGEN SATURATION: 98 % | RESPIRATION RATE: 18 BRPM | BODY MASS INDEX: 26.69 KG/M2 | HEIGHT: 64 IN | DIASTOLIC BLOOD PRESSURE: 87 MMHG | SYSTOLIC BLOOD PRESSURE: 150 MMHG

## 2018-08-17 DIAGNOSIS — G44.209 TENSION HEADACHE: ICD-10-CM

## 2018-08-17 DIAGNOSIS — R11.2 NON-INTRACTABLE VOMITING WITH NAUSEA, UNSPECIFIED VOMITING TYPE: Primary | ICD-10-CM

## 2018-08-17 LAB
ALBUMIN SERPL-MCNC: 4.1 G/DL (ref 3.5–5)
ALBUMIN/GLOB SERPL: 0.9 {RATIO} (ref 1.1–2.2)
ALP SERPL-CCNC: 94 U/L (ref 45–117)
ALT SERPL-CCNC: 14 U/L (ref 12–78)
ANION GAP SERPL CALC-SCNC: 9 MMOL/L (ref 5–15)
APPEARANCE UR: CLEAR
AST SERPL-CCNC: 13 U/L (ref 15–37)
BACTERIA URNS QL MICRO: NEGATIVE /HPF
BASOPHILS # BLD: 0 K/UL (ref 0–0.1)
BASOPHILS NFR BLD: 0 % (ref 0–1)
BILIRUB SERPL-MCNC: 0.2 MG/DL (ref 0.2–1)
BILIRUB UR QL: NEGATIVE
BUN SERPL-MCNC: 8 MG/DL (ref 6–20)
BUN/CREAT SERPL: 10 (ref 12–20)
CALCIUM SERPL-MCNC: 9.7 MG/DL (ref 8.5–10.1)
CHLORIDE SERPL-SCNC: 108 MMOL/L (ref 97–108)
CO2 SERPL-SCNC: 24 MMOL/L (ref 21–32)
COLOR UR: NORMAL
CREAT SERPL-MCNC: 0.78 MG/DL (ref 0.55–1.02)
DIFFERENTIAL METHOD BLD: ABNORMAL
EOSINOPHIL # BLD: 0.1 K/UL (ref 0–0.4)
EOSINOPHIL NFR BLD: 1 % (ref 0–7)
EPITH CASTS URNS QL MICRO: NORMAL /LPF
ERYTHROCYTE [DISTWIDTH] IN BLOOD BY AUTOMATED COUNT: 12 % (ref 11.5–14.5)
GLOBULIN SER CALC-MCNC: 4.4 G/DL (ref 2–4)
GLUCOSE BLD STRIP.AUTO-MCNC: 140 MG/DL (ref 65–100)
GLUCOSE SERPL-MCNC: 108 MG/DL (ref 65–100)
GLUCOSE UR STRIP.AUTO-MCNC: NEGATIVE MG/DL
HCG UR QL: NEGATIVE
HCT VFR BLD AUTO: 39.4 % (ref 35–47)
HGB BLD-MCNC: 13.2 G/DL (ref 11.5–16)
HGB UR QL STRIP: NEGATIVE
HYALINE CASTS URNS QL MICRO: NORMAL /LPF (ref 0–5)
IMM GRANULOCYTES # BLD: 0.1 K/UL (ref 0–0.04)
IMM GRANULOCYTES NFR BLD AUTO: 0 % (ref 0–0.5)
KETONES UR QL STRIP.AUTO: NEGATIVE MG/DL
LEUKOCYTE ESTERASE UR QL STRIP.AUTO: NEGATIVE
LYMPHOCYTES # BLD: 1.8 K/UL (ref 0.8–3.5)
LYMPHOCYTES NFR BLD: 13 % (ref 12–49)
MCH RBC QN AUTO: 30.6 PG (ref 26–34)
MCHC RBC AUTO-ENTMCNC: 33.5 G/DL (ref 30–36.5)
MCV RBC AUTO: 91.2 FL (ref 80–99)
MONOCYTES # BLD: 0.5 K/UL (ref 0–1)
MONOCYTES NFR BLD: 3 % (ref 5–13)
NEUTS SEG # BLD: 11.7 K/UL (ref 1.8–8)
NEUTS SEG NFR BLD: 83 % (ref 32–75)
NITRITE UR QL STRIP.AUTO: NEGATIVE
NRBC # BLD: 0 K/UL (ref 0–0.01)
NRBC BLD-RTO: 0 PER 100 WBC
PH UR STRIP: 6 [PH] (ref 5–8)
PLATELET # BLD AUTO: 499 K/UL (ref 150–400)
PMV BLD AUTO: 9.3 FL (ref 8.9–12.9)
POTASSIUM SERPL-SCNC: 3.5 MMOL/L (ref 3.5–5.1)
PROT SERPL-MCNC: 8.5 G/DL (ref 6.4–8.2)
PROT UR STRIP-MCNC: NEGATIVE MG/DL
RBC # BLD AUTO: 4.32 M/UL (ref 3.8–5.2)
RBC #/AREA URNS HPF: NORMAL /HPF (ref 0–5)
SERVICE CMNT-IMP: ABNORMAL
SODIUM SERPL-SCNC: 141 MMOL/L (ref 136–145)
SP GR UR REFRACTOMETRY: 1 (ref 1–1.03)
UA: UC IF INDICATED,UAUC: NORMAL
UROBILINOGEN UR QL STRIP.AUTO: 0.2 EU/DL (ref 0.2–1)
WBC # BLD AUTO: 14.1 K/UL (ref 3.6–11)
WBC URNS QL MICRO: NORMAL /HPF (ref 0–4)

## 2018-08-17 PROCEDURE — 99284 EMERGENCY DEPT VISIT MOD MDM: CPT

## 2018-08-17 PROCEDURE — 85025 COMPLETE CBC W/AUTO DIFF WBC: CPT | Performed by: EMERGENCY MEDICINE

## 2018-08-17 PROCEDURE — 36415 COLL VENOUS BLD VENIPUNCTURE: CPT | Performed by: EMERGENCY MEDICINE

## 2018-08-17 PROCEDURE — 74011250636 HC RX REV CODE- 250/636: Performed by: EMERGENCY MEDICINE

## 2018-08-17 PROCEDURE — 80053 COMPREHEN METABOLIC PANEL: CPT | Performed by: EMERGENCY MEDICINE

## 2018-08-17 PROCEDURE — 81001 URINALYSIS AUTO W/SCOPE: CPT | Performed by: EMERGENCY MEDICINE

## 2018-08-17 PROCEDURE — 82962 GLUCOSE BLOOD TEST: CPT

## 2018-08-17 PROCEDURE — 96374 THER/PROPH/DIAG INJ IV PUSH: CPT

## 2018-08-17 PROCEDURE — 74011250637 HC RX REV CODE- 250/637: Performed by: EMERGENCY MEDICINE

## 2018-08-17 PROCEDURE — 81025 URINE PREGNANCY TEST: CPT

## 2018-08-17 RX ORDER — ONDANSETRON 2 MG/ML
4 INJECTION INTRAMUSCULAR; INTRAVENOUS
Status: COMPLETED | OUTPATIENT
Start: 2018-08-17 | End: 2018-08-17

## 2018-08-17 RX ORDER — BUTALBITAL, ACETAMINOPHEN AND CAFFEINE 300; 40; 50 MG/1; MG/1; MG/1
2 CAPSULE ORAL
Qty: 10 CAP | Refills: 0 | Status: SHIPPED | OUTPATIENT
Start: 2018-08-17 | End: 2018-08-21

## 2018-08-17 RX ORDER — ONDANSETRON 4 MG/1
4 TABLET, ORALLY DISINTEGRATING ORAL
Qty: 10 TAB | Refills: 0 | Status: SHIPPED | OUTPATIENT
Start: 2018-08-17 | End: 2018-08-20

## 2018-08-17 RX ORDER — ACETAMINOPHEN 325 MG/1
650 TABLET ORAL
Status: COMPLETED | OUTPATIENT
Start: 2018-08-17 | End: 2018-08-17

## 2018-08-17 RX ADMIN — ACETAMINOPHEN 650 MG: 325 TABLET ORAL at 10:24

## 2018-08-17 RX ADMIN — ONDANSETRON 4 MG: 2 INJECTION, SOLUTION INTRAMUSCULAR; INTRAVENOUS at 10:24

## 2018-08-17 NOTE — ED NOTES
Dr Florence Olvera reviewed discharge instructions with the patient. The patient verbalized understanding. All questions and concerns were addressed. The patient declined a wheelchair and is discharged ambulatory in the care of family members with instructions and prescriptions in hand. Pt is alert and oriented x 4. Respirations are clear and unlabored.

## 2018-08-17 NOTE — ED NOTES
Pt given Zofran, Tylenol, and PO challenge of ice water. Pt resting comfortably with family at bedside.

## 2018-08-17 NOTE — ED NOTES
Pt reports \"tension\" like headache for one week with nausea and vomiting. Pt denies photophobia, Dz, CP/SOB or cough. Pt reports Hx of migraines but states that this does not feel like her normal migraine. Pt resting comfortably with family at bedside.

## 2018-08-17 NOTE — ED NOTES
MD Cam reviewed discharge instructions with the patient. The patient verbalized understanding. All questions and concerns were addressed. The patient declined a wheelchair and is discharged ambulatory in the care of family members with instructions and prescriptions in hand. Pt is alert and oriented x 4. Respirations are clear and unlabored.

## 2018-08-17 NOTE — DISCHARGE INSTRUCTIONS
Headache: Care Instructions  Your Care Instructions    Headaches have many possible causes. Most headaches aren't a sign of a more serious problem, and they will get better on their own. Home treatment may help you feel better faster. The doctor has checked you carefully, but problems can develop later. If you notice any problems or new symptoms, get medical treatment right away. Follow-up care is a key part of your treatment and safety. Be sure to make and go to all appointments, and call your doctor if you are having problems. It's also a good idea to know your test results and keep a list of the medicines you take. How can you care for yourself at home? · Do not drive if you have taken a prescription pain medicine. · Rest in a quiet, dark room until your headache is gone. Close your eyes and try to relax or go to sleep. Don't watch TV or read. · Put a cold, moist cloth or cold pack on the painful area for 10 to 20 minutes at a time. Put a thin cloth between the cold pack and your skin. · Use a warm, moist towel or a heating pad set on low to relax tight shoulder and neck muscles. · Have someone gently massage your neck and shoulders. · Take pain medicines exactly as directed. ¨ If the doctor gave you a prescription medicine for pain, take it as prescribed. ¨ If you are not taking a prescription pain medicine, ask your doctor if you can take an over-the-counter medicine. · Be careful not to take pain medicine more often than the instructions allow, because you may get worse or more frequent headaches when the medicine wears off. · Do not ignore new symptoms that occur with a headache, such as a fever, weakness or numbness, vision changes, or confusion. These may be signs of a more serious problem. To prevent headaches  · Keep a headache diary so you can figure out what triggers your headaches. Avoiding triggers may help you prevent headaches.  Record when each headache began, how long it lasted, and what the pain was like (throbbing, aching, stabbing, or dull). Write down any other symptoms you had with the headache, such as nausea, flashing lights or dark spots, or sensitivity to bright light or loud noise. Note if the headache occurred near your period. List anything that might have triggered the headache, such as certain foods (chocolate, cheese, wine) or odors, smoke, bright light, stress, or lack of sleep. · Find healthy ways to deal with stress. Headaches are most common during or right after stressful times. Take time to relax before and after you do something that has caused a headache in the past.  · Try to keep your muscles relaxed by keeping good posture. Check your jaw, face, neck, and shoulder muscles for tension, and try relaxing them. When sitting at a desk, change positions often, and stretch for 30 seconds each hour. · Get plenty of sleep and exercise. · Eat regularly and well. Long periods without food can trigger a headache. · Treat yourself to a massage. Some people find that regular massages are very helpful in relieving tension. · Limit caffeine by not drinking too much coffee, tea, or soda. But don't quit caffeine suddenly, because that can also give you headaches. · Reduce eyestrain from computers by blinking frequently and looking away from the computer screen every so often. Make sure you have proper eyewear and that your monitor is set up properly, about an arm's length away. · Seek help if you have depression or anxiety. Your headaches may be linked to these conditions. Treatment can both prevent headaches and help with symptoms of anxiety or depression. When should you call for help? Call 911 anytime you think you may need emergency care. For example, call if:    · You have signs of a stroke. These may include:  ¨ Sudden numbness, paralysis, or weakness in your face, arm, or leg, especially on only one side of your body. ¨ Sudden vision changes.   ¨ Sudden trouble speaking. ¨ Sudden confusion or trouble understanding simple statements. ¨ Sudden problems with walking or balance. ¨ A sudden, severe headache that is different from past headaches.    Call your doctor now or seek immediate medical care if:    · You have a new or worse headache.     · Your headache gets much worse. Where can you learn more? Go to http://yulia-amilcar.info/. Enter M271 in the search box to learn more about \"Headache: Care Instructions. \"  Current as of: October 9, 2017  Content Version: 11.7  © 4338-9634 [employer]. Care instructions adapted under license by [employer] (which disclaims liability or warranty for this information). If you have questions about a medical condition or this instruction, always ask your healthcare professional. [address] any warranty or liability for your use of this information. Nausea and Vomiting: Care Instructions  Your Care Instructions    When you are nauseated, you may feel weak and sweaty and notice a lot of saliva in your mouth. Nausea often leads to vomiting. Most of the time you do not need to worry about nausea and vomiting, but they can be signs of other illnesses. Two common causes of nausea and vomiting are stomach flu and food poisoning. Nausea and vomiting from viral stomach flu will usually start to improve within 24 hours. Nausea and vomiting from food poisoning may last from 12 to 48 hours. The doctor has checked you carefully, but problems can develop later. If you notice any problems or new symptoms, get medical treatment right away. Follow-up care is a key part of your treatment and safety. Be sure to make and go to all appointments, and call your doctor if you are having problems. It's also a good idea to know your test results and keep a list of the medicines you take. How can you care for yourself at home?   · To prevent dehydration, drink plenty of fluids, enough so that your urine is light yellow or clear like water. Choose water and other caffeine-free clear liquids until you feel better. If you have kidney, heart, or liver disease and have to limit fluids, talk with your doctor before you increase the amount of fluids you drink. · Rest in bed until you feel better. · When you are able to eat, try clear soups, mild foods, and liquids until all symptoms are gone for 12 to 48 hours. Other good choices include dry toast, crackers, cooked cereal, and gelatin dessert, such as Jell-O. When should you call for help? Call 911 anytime you think you may need emergency care. For example, call if:    · You passed out (lost consciousness).    Call your doctor now or seek immediate medical care if:    · You have symptoms of dehydration, such as:  ¨ Dry eyes and a dry mouth. ¨ Passing only a little dark urine. ¨ Feeling thirstier than usual.     · You have new or worsening belly pain.     · You have a new or higher fever.     · You vomit blood or what looks like coffee grounds.    Watch closely for changes in your health, and be sure to contact your doctor if:    · You have ongoing nausea and vomiting.     · Your vomiting is getting worse.     · Your vomiting lasts longer than 2 days.     · You are not getting better as expected. Where can you learn more? Go to http://yulia-amilcar.info/. Enter 25 [id number] in the search box to learn more about \"Nausea and Vomiting: Care Instructions. \"  Current as of: November 20, 2017  Content Version: 11.7  © 7491-0625 [employer]. Care instructions adapted under license by [employer] (which disclaims liability or warranty for this information). If you have questions about a medical condition or this instruction, always ask your healthcare professional. [address] any warranty or liability for your use of this information.

## 2018-08-17 NOTE — ED PROVIDER NOTES
EMERGENCY DEPARTMENT HISTORY AND PHYSICAL EXAM      Date: 8/17/2018  Patient Name: Garfield Og    History of Presenting Illness     Chief Complaint   Patient presents with    Headache     x one week with n/v       History Provided By: Patient    HPI: Garfield Og, 34 y.o. female with PMHx significant for asthma, GERD, and migraines, presents via wheelchair to the ED with cc of diffuse, pressure, tight headache x 1 week. Pt reports associated sx of nausea with emesis x yesterday evening, intermittent sharp epigastric pain, anxiety, and intermittent dizziness as well. She expresses at the onset of sx she attributed her sx to hypoglycemia but notes her BG to be normal from the 80's-90's. Pt discloses no formal DM diagnosis but reports a strong FHx. She states there are no exacerbating factors to her headache and has found relief with resting and reports her headache has resolved upon arrival to ED. Pt reports a h/o migraines. She states she is photophobic and phonophobic with previous migraines but denies any current photophobia or phonophobia. She denies any current headache, fevers, chills, photophobia, phonophobia, chest pain, SOB, cough, diarrhea, dysuria, or hematuria. There are no other complaints, changes, or physical findings at this time.     PCP: Jacklyn Damon MD    Current Facility-Administered Medications   Medication Dose Route Frequency Provider Last Rate Last Dose    acetaminophen (TYLENOL) tablet 650 mg  650 mg Oral NOW Nathanael Riojas MD        ondansetron [facility]) injection 4 mg  4 mg IntraVENous Paco Quintana MD           Past History     Past Medical History:  Past Medical History:   Diagnosis Date    Asthma     Community acquired pneumonia     GERD (gastroesophageal reflux disease)     Headache(784.0)     Migraine    Lung nodule     Neurological disorder     migraines    Second hand smoke exposure        Past Surgical History:  Past Surgical History: Procedure Laterality Date    HX HEENT      wisdom teeth extraction    HX HEENT      BMWT       Family History:  Family History   Problem Relation Age of Onset    Diabetes Father     Heart Disease Father     Hypertension Father     Diabetes Mother     Hypertension Mother     Diabetes Paternal Grandmother        Social History:  Social History   Substance Use Topics    Smoking status: Former Smoker     Packs/day: 1.00     Years: 5.00     Quit date: 7/20/2017    Smokeless tobacco: Never Used    Alcohol use No       Allergies:  No Known Allergies      Review of Systems   Review of Systems   Constitutional: Positive for appetite change (secondary to nausea ). Negative for chills, fatigue and fever. HENT: Negative for congestion, rhinorrhea and sore throat. (-) phonophobia    Eyes: Negative for photophobia, pain, discharge and visual disturbance. Respiratory: Negative for cough, chest tightness, shortness of breath and wheezing. Cardiovascular: Negative for chest pain, palpitations and leg swelling. Gastrointestinal: Positive for abdominal pain (intermittent epigastric ), nausea and vomiting. Negative for constipation and diarrhea. Genitourinary: Negative for dysuria, frequency and hematuria. Musculoskeletal: Negative for arthralgias, back pain and myalgias. Skin: Negative for rash. Neurological: Positive for dizziness and headaches (diffuse ). Negative for weakness and light-headedness. Psychiatric/Behavioral: The patient is nervous/anxious. Physical Exam   Physical Exam   Constitutional: She is oriented to person, place, and time. She appears well-developed and well-nourished. No distress. HENT:   Head: Normocephalic and atraumatic. Eyes: EOM are normal. Right eye exhibits no discharge. Left eye exhibits no discharge. No scleral icterus. Neck: Normal range of motion. Neck supple. No tracheal deviation present.    No meningismus    Cardiovascular: Normal rate, regular rhythm, normal heart sounds and intact distal pulses. Exam reveals no gallop and no friction rub. No murmur heard. Pulmonary/Chest: Effort normal and breath sounds normal. No respiratory distress. She has no wheezes. She has no rales. Abdominal: Soft. She exhibits no distension. There is no tenderness. Musculoskeletal: Normal range of motion. She exhibits no edema. Lymphadenopathy:     She has no cervical adenopathy. Neurological: She is alert and oriented to person, place, and time. No focal neuro deficits   Skin: Skin is warm and dry. No rash noted. Psychiatric: She has a normal mood and affect. Nursing note and vitals reviewed.       Diagnostic Study Results     Labs -     Recent Results (from the past 12 hour(s))   GLUCOSE, POC    Collection Time: 08/17/18  9:49 AM   Result Value Ref Range    Glucose (POC) 140 (H) 65 - 100 mg/dL    Performed by Jesse Phelps (PCT)    HCG URINE, QL. - POC    Collection Time: 08/17/18  9:52 AM   Result Value Ref Range    Pregnancy test,urine (POC) NEGATIVE  NEG     URINALYSIS W/ REFLEX CULTURE    Collection Time: 08/17/18  9:54 AM   Result Value Ref Range    Color YELLOW/STRAW      Appearance CLEAR CLEAR      Specific gravity 1.005 1.003 - 1.030      pH (UA) 6.0 5.0 - 8.0      Protein NEGATIVE  NEG mg/dL    Glucose NEGATIVE  NEG mg/dL    Ketone NEGATIVE  NEG mg/dL    Bilirubin NEGATIVE  NEG      Blood NEGATIVE  NEG      Urobilinogen 0.2 0.2 - 1.0 EU/dL    Nitrites NEGATIVE  NEG      Leukocyte Esterase NEGATIVE  NEG      WBC 0-4 0 - 4 /hpf    RBC 0-5 0 - 5 /hpf    Epithelial cells FEW FEW /lpf    Bacteria NEGATIVE  NEG /hpf    UA:UC IF INDICATED CULTURE NOT INDICATED BY UA RESULT CNI      Hyaline cast 0-2 0 - 5 /lpf   CBC WITH AUTOMATED DIFF    Collection Time: 08/17/18 10:18 AM   Result Value Ref Range    WBC 14.1 (H) 3.6 - 11.0 K/uL    RBC 4.32 3.80 - 5.20 M/uL    HGB 13.2 11.5 - 16.0 g/dL    HCT 39.4 35.0 - 47.0 %    MCV 91.2 80.0 - 99.0 FL    MCH 30.6 26.0 - 34.0 PG    MCHC 33.5 30.0 - 36.5 g/dL    RDW 12.0 11.5 - 14.5 %    PLATELET 106 (H) 249 - 400 K/uL    MPV 9.3 8.9 - 12.9 FL    NRBC 0.0 0  WBC    ABSOLUTE NRBC 0.00 0.00 - 0.01 K/uL    NEUTROPHILS 83 (H) 32 - 75 %    LYMPHOCYTES 13 12 - 49 %    MONOCYTES 3 (L) 5 - 13 %    EOSINOPHILS 1 0 - 7 %    BASOPHILS 0 0 - 1 %    IMMATURE GRANULOCYTES 0 0.0 - 0.5 %    ABS. NEUTROPHILS 11.7 (H) 1.8 - 8.0 K/UL    ABS. LYMPHOCYTES 1.8 0.8 - 3.5 K/UL    ABS. MONOCYTES 0.5 0.0 - 1.0 K/UL    ABS. EOSINOPHILS 0.1 0.0 - 0.4 K/UL    ABS. BASOPHILS 0.0 0.0 - 0.1 K/UL    ABS. IMM. GRANS. 0.1 (H) 0.00 - 0.04 K/UL    DF AUTOMATED     METABOLIC PANEL, COMPREHENSIVE    Collection Time: 08/17/18 10:18 AM   Result Value Ref Range    Sodium 141 136 - 145 mmol/L    Potassium 3.5 3.5 - 5.1 mmol/L    Chloride 108 97 - 108 mmol/L    CO2 24 21 - 32 mmol/L    Anion gap 9 5 - 15 mmol/L    Glucose 108 (H) 65 - 100 mg/dL    BUN 8 6 - 20 MG/DL    Creatinine 0.78 0.55 - 1.02 MG/DL    BUN/Creatinine ratio 10 (L) 12 - 20      GFR est AA >60 >60 ml/min/1.73m2    GFR est non-AA >60 >60 ml/min/1.73m2    Calcium 9.7 8.5 - 10.1 MG/DL    Bilirubin, total 0.2 0.2 - 1.0 MG/DL    ALT (SGPT) 14 12 - 78 U/L    AST (SGOT) 13 (L) 15 - 37 U/L    Alk. phosphatase 94 45 - 117 U/L    Protein, total 8.5 (H) 6.4 - 8.2 g/dL    Albumin 4.1 3.5 - 5.0 g/dL    Globulin 4.4 (H) 2.0 - 4.0 g/dL    A-G Ratio 0.9 (L) 1.1 - 2.2           Medical Decision Making   I am the first provider for this patient. I reviewed the vital signs, available nursing notes, past medical history, past surgical history, family history and social history. Vital Signs-Reviewed the patient's vital signs.   Patient Vitals for the past 12 hrs:   Temp Pulse Resp BP SpO2   08/17/18 1008 - - - - 98 %   08/17/18 0943 99 °F (37.2 °C) 73 18 150/87 100 %       Pulse Oximetry Analysis - 100% on room air    Cardiac Monitor:   Rate: 73 bpm  Rhythm: Normal Sinus Rhythm        Records Reviewed: Nursing Notes, Old Medical Records, Previous Radiology Studies and Previous Laboratory Studies    Provider Notes (Medical Decision Making):   DDx: Dehydration, Electrolyte abnormality, UTI, Pregnancy, Gastritis, GERD, PUD, Tension headache, Migraine. Disposition: Patient is a 34year old female who presents to ED with nausea, vomiting, and tension type headache. She reports headache resolved prior to ED arrival. She is afebrile, non-toxic appearing, neurologically intact. Labs indicate leukocytosis likely related to nausea, vomiting; labs otherwise unremarkable, abdominal exam is benign. Do not suspect acute intraabdominal process or meningitis. Pt feeling better after symptomatic management, tolerating PO. Will discharge with PCP follow up. ED Course:   Initial assessment performed. The patients presenting problems have been discussed, and they are in agreement with the care plan formulated and outlined with them. I have encouraged them to ask questions as they arise throughout their visit. Progress Notes:  11:04 AM   The pt has been re-evaluated. Pt expresses her sx have improved with tx in the ED. She was updated on reassuring lab findings and informed of the plan for discharge. Discussed results, prescriptions and follow up plan with patient. Provided customary return to ED instructions. Patient expressed understanding. Geoffrey Monteiro MD    Critical Care Time: 0 minutes    Disposition:  Discharge Note:  11:04 AM  The patient is ready for discharge. The patient's signs, symptoms, diagnosis, and discharge instruction have been discussed and the patient has conveyed their understanding. The patient is to follow up as recommended or return to the ER should their symptoms worsen. Plan has been discussed and the patient is in agreement. Written by Celina Sweeney ED Scribe, as dictated by Micah Irvin MD    PLAN:  1.    Current Discharge Medication List      START taking these medications    Details butalbital-acetaminophen-caff (FIORICET) -40 mg per capsule Take 2 Caps by mouth every eight (8) hours as needed for Pain. Qty: 10 Cap, Refills: 0      ondansetron (ZOFRAN ODT) 4 mg disintegrating tablet Take 1 Tab by mouth every eight (8) hours as needed for Nausea. Qty: 10 Tab, Refills: 0           2. Follow-up Information     Follow up With Details Comments Contact Info    Delores Dakins, MD In 2 days  [address] [place] [address]  [phone]      [facility] EMERGENCY DEPT  As needed, If symptoms worsen [address] [place] [address]  [phone]        Return to ED if worse     Diagnosis     Clinical Impression:   1. Non-intractable vomiting with nausea, unspecified vomiting type    2. Tension headache        Attestations:    Attestation: This note is prepared by Zeb Sweeney, acting as Scribe for Jeannette Tafoya MD.      Jeannette Tafoya MD: The scribe's documentation has been prepared under my direction and personally reviewed by me in its entirety. I confirm that the note above accurately reflects all work, treatment, procedures, and medical decision making performed by me.

## 2018-08-19 RX ORDER — PROMETHAZINE HYDROCHLORIDE 25 MG/1
25 TABLET ORAL
Qty: 12 TAB | Refills: 0 | Status: SHIPPED | OUTPATIENT
Start: 2018-08-19 | End: 2018-08-28

## 2018-08-19 NOTE — PROGRESS NOTES
Patient calls to report she is still having n/v. She reports she was doing better yesterday but today began vomiting again. She denies abdominal pain, fever, headache but is lightheaded. She reports she has been anxious and she feels this may contributing to symptoms. She does not feel zofran is helping. Will treat with phenergan. Advised pt to return to ED if symptoms worsen or she is concerned. Advised follow up with PCP early this week.   Raeann Rivera MD

## 2018-08-20 ENCOUNTER — APPOINTMENT (OUTPATIENT)
Dept: GENERAL RADIOLOGY | Age: 28
End: 2018-08-20
Attending: EMERGENCY MEDICINE
Payer: SUBSIDIZED

## 2018-08-20 ENCOUNTER — HOSPITAL ENCOUNTER (EMERGENCY)
Age: 28
Discharge: HOME | End: 2018-08-20
Attending: EMERGENCY MEDICINE
Payer: SUBSIDIZED

## 2018-08-20 VITALS
SYSTOLIC BLOOD PRESSURE: 156 MMHG | OXYGEN SATURATION: 100 % | RESPIRATION RATE: 18 BRPM | HEART RATE: 73 BPM | TEMPERATURE: 98.3 F | BODY MASS INDEX: 25.75 KG/M2 | DIASTOLIC BLOOD PRESSURE: 86 MMHG | WEIGHT: 150 LBS

## 2018-08-20 DIAGNOSIS — F41.0 ANXIETY ATTACK: Primary | ICD-10-CM

## 2018-08-20 LAB
ALBUMIN SERPL-MCNC: 4.6 G/DL (ref 3.5–5)
ALBUMIN/GLOB SERPL: 0.9 {RATIO} (ref 1.1–2.2)
ALP SERPL-CCNC: 133 U/L (ref 45–117)
ALT SERPL-CCNC: 23 U/L (ref 12–78)
ANION GAP SERPL CALC-SCNC: 7 MMOL/L (ref 5–15)
AST SERPL-CCNC: 17 U/L (ref 15–37)
BASOPHILS # BLD: 0.1 K/UL (ref 0–0.1)
BASOPHILS NFR BLD: 0 % (ref 0–1)
BILIRUB SERPL-MCNC: 0.2 MG/DL (ref 0.2–1)
BUN SERPL-MCNC: 6 MG/DL (ref 6–20)
BUN/CREAT SERPL: 8 (ref 12–20)
CALCIUM SERPL-MCNC: 9.8 MG/DL (ref 8.5–10.1)
CHLORIDE SERPL-SCNC: 104 MMOL/L (ref 97–108)
CO2 SERPL-SCNC: 27 MMOL/L (ref 21–32)
COMMENT, HOLDF: NORMAL
CREAT SERPL-MCNC: 0.8 MG/DL (ref 0.55–1.02)
DIFFERENTIAL METHOD BLD: ABNORMAL
EOSINOPHIL # BLD: 0 K/UL (ref 0–0.4)
EOSINOPHIL NFR BLD: 0 % (ref 0–7)
ERYTHROCYTE [DISTWIDTH] IN BLOOD BY AUTOMATED COUNT: 11.8 % (ref 11.5–14.5)
GLOBULIN SER CALC-MCNC: 4.9 G/DL (ref 2–4)
GLUCOSE BLD STRIP.AUTO-MCNC: 91 MG/DL (ref 65–100)
GLUCOSE SERPL-MCNC: 75 MG/DL (ref 65–100)
HCT VFR BLD AUTO: 42.5 % (ref 35–47)
HGB BLD-MCNC: 14.7 G/DL (ref 11.5–16)
IMM GRANULOCYTES # BLD: 0.1 K/UL (ref 0–0.04)
IMM GRANULOCYTES NFR BLD AUTO: 1 % (ref 0–0.5)
INR PPP: 1 (ref 0.9–1.1)
LYMPHOCYTES # BLD: 2.8 K/UL (ref 0.8–3.5)
LYMPHOCYTES NFR BLD: 16 % (ref 12–49)
MAGNESIUM SERPL-MCNC: 2.4 MG/DL (ref 1.6–2.4)
MCH RBC QN AUTO: 30.5 PG (ref 26–34)
MCHC RBC AUTO-ENTMCNC: 34.6 G/DL (ref 30–36.5)
MCV RBC AUTO: 88.2 FL (ref 80–99)
MONOCYTES # BLD: 1 K/UL (ref 0–1)
MONOCYTES NFR BLD: 6 % (ref 5–13)
NEUTS SEG # BLD: 13.8 K/UL (ref 1.8–8)
NEUTS SEG NFR BLD: 78 % (ref 32–75)
NRBC # BLD: 0 K/UL (ref 0–0.01)
NRBC BLD-RTO: 0 PER 100 WBC
PLATELET # BLD AUTO: 503 K/UL (ref 150–400)
PMV BLD AUTO: 9.3 FL (ref 8.9–12.9)
POTASSIUM SERPL-SCNC: 3.6 MMOL/L (ref 3.5–5.1)
PROT SERPL-MCNC: 9.5 G/DL (ref 6.4–8.2)
PROTHROMBIN TIME: 10.4 SEC (ref 9–11.1)
RBC # BLD AUTO: 4.82 M/UL (ref 3.8–5.2)
SAMPLES BEING HELD,HOLD: NORMAL
SERVICE CMNT-IMP: NORMAL
SODIUM SERPL-SCNC: 138 MMOL/L (ref 136–145)
TROPONIN I SERPL-MCNC: <0.05 NG/ML
WBC # BLD AUTO: 17.7 K/UL (ref 3.6–11)

## 2018-08-20 PROCEDURE — 99284 EMERGENCY DEPT VISIT MOD MDM: CPT

## 2018-08-20 PROCEDURE — 36415 COLL VENOUS BLD VENIPUNCTURE: CPT | Performed by: EMERGENCY MEDICINE

## 2018-08-20 PROCEDURE — 83735 ASSAY OF MAGNESIUM: CPT | Performed by: EMERGENCY MEDICINE

## 2018-08-20 PROCEDURE — 84484 ASSAY OF TROPONIN QUANT: CPT | Performed by: EMERGENCY MEDICINE

## 2018-08-20 PROCEDURE — 74022 RADEX COMPL AQT ABD SERIES: CPT

## 2018-08-20 PROCEDURE — 85025 COMPLETE CBC W/AUTO DIFF WBC: CPT | Performed by: EMERGENCY MEDICINE

## 2018-08-20 PROCEDURE — 96372 THER/PROPH/DIAG INJ SC/IM: CPT

## 2018-08-20 PROCEDURE — 85610 PROTHROMBIN TIME: CPT | Performed by: EMERGENCY MEDICINE

## 2018-08-20 PROCEDURE — 80053 COMPREHEN METABOLIC PANEL: CPT | Performed by: EMERGENCY MEDICINE

## 2018-08-20 PROCEDURE — 82962 GLUCOSE BLOOD TEST: CPT

## 2018-08-20 PROCEDURE — 74011250636 HC RX REV CODE- 250/636: Performed by: EMERGENCY MEDICINE

## 2018-08-20 PROCEDURE — 93005 ELECTROCARDIOGRAM TRACING: CPT

## 2018-08-20 RX ORDER — LORAZEPAM 2 MG/ML
0.5 INJECTION INTRAMUSCULAR
Status: DISCONTINUED | OUTPATIENT
Start: 2018-08-20 | End: 2018-08-20 | Stop reason: HOSPADM

## 2018-08-20 RX ORDER — LORAZEPAM 2 MG/ML
0.5 INJECTION INTRAMUSCULAR
Status: COMPLETED | OUTPATIENT
Start: 2018-08-20 | End: 2018-08-20

## 2018-08-20 RX ORDER — SODIUM CHLORIDE 0.9 % (FLUSH) 0.9 %
5-10 SYRINGE (ML) INJECTION AS NEEDED
Status: DISCONTINUED | OUTPATIENT
Start: 2018-08-20 | End: 2018-08-20 | Stop reason: HOSPADM

## 2018-08-20 RX ORDER — SODIUM CHLORIDE 0.9 % (FLUSH) 0.9 %
5-10 SYRINGE (ML) INJECTION EVERY 8 HOURS
Status: DISCONTINUED | OUTPATIENT
Start: 2018-08-20 | End: 2018-08-20 | Stop reason: HOSPADM

## 2018-08-20 RX ORDER — ALPRAZOLAM 0.25 MG/1
0.25 TABLET ORAL
Qty: 12 TAB | Refills: 0 | Status: SHIPPED | OUTPATIENT
Start: 2018-08-20 | End: 2018-08-22 | Stop reason: ALTCHOICE

## 2018-08-20 RX ADMIN — LORAZEPAM 0.5 MG: 2 INJECTION INTRAMUSCULAR; INTRAVENOUS at 18:57

## 2018-08-20 NOTE — ED NOTES
Pt discharged by Dr. Lucia Dorantes. Pt provided with discharge instructions Rx and instructions on follow up care. Pt out of ED ambulatory without difficulty accompanied by family.

## 2018-08-20 NOTE — ED PROVIDER NOTES
EMERGENCY DEPARTMENT HISTORY AND PHYSICAL EXAM      Date: 8/20/2018  Patient Name: Randall Martell    History of Presenting Illness     Chief Complaint   Patient presents with    Chest Pain     Left sided since thursday evening. Patient denies SOB. Was seen Friday morning for same symptoms (accompanied n/v)       History Provided By: Patient    HPI: Randall Martell, 34 y.o. female with PMHx significant for asthma, GERD, presents ambulatory to the ED with cc of gradual onset HA, chest pain, and constipation. Pt reports on 08/17/2018 he had sxs of nausea and vomiting, that were later relieved. Pt reports current constipation with minimal feces when defecating. Pt reports that sxs have not improved since 08/17/2018. Pt reports given Zofran at this ED on 08/17/2018, and later prescribed Fioricet by Merly Hdz MD.  Pt reports both medications offered no relief. Pt reports last BM was ~2 hours pta. Pt denies EtOH or smoking tobacco.  She specifically denies any fevers, chills, shortness of breath, rash, diarrhea, sweating or weight loss. There are no other complaints, changes, or physical findings at this time. PCP: Devante Rico NP    Current Facility-Administered Medications   Medication Dose Route Frequency Provider Last Rate Last Dose    sodium chloride (NS) flush 5-10 mL  5-10 mL IntraVENous Q8H Becky Rojo MD        sodium chloride (NS) flush 5-10 mL  5-10 mL IntraVENous PRN Becky Rojo MD        LORazepam (ATIVAN) injection 0.5 mg  0.5 mg IntraVENous NOW Becky Rojo MD        LORazepam (ATIVAN) injection 0.5 mg  0.5 mg IntraMUSCular NOW Becky Rojo MD         Current Outpatient Prescriptions   Medication Sig Dispense Refill    ALPRAZolam (XANAX) 0.25 mg tablet Take 1 Tab by mouth every eight (8) hours as needed for Anxiety. Max Daily Amount: 0.75 mg. 12 Tab 0    promethazine (PHENERGAN) 25 mg tablet Take 1 Tab by mouth every six (6) hours as needed for Nausea.  12 Tab 0    butalbital-acetaminophen-caff (FIORICET) -40 mg per capsule Take 2 Caps by mouth every eight (8) hours as needed for Pain. 10 Cap 0       Past History     Past Medical History:  Past Medical History:   Diagnosis Date    Asthma     Community acquired pneumonia     GERD (gastroesophageal reflux disease)     Headache(784.0)     Migraine    Lung nodule     Neurological disorder     migraines    Second hand smoke exposure        Past Surgical History:  Past Surgical History:   Procedure Laterality Date    HX CHOLECYSTECTOMY      HX HEENT      wisdom teeth extraction    HX HEENT      BMWT       Family History:  Family History   Problem Relation Age of Onset    Diabetes Father     Heart Disease Father     Hypertension Father     Diabetes Mother     Hypertension Mother     Diabetes Paternal Grandmother        Social History:  Social History   Substance Use Topics    Smoking status: Former Smoker     Packs/day: 1.00     Years: 5.00     Quit date: 7/20/2017    Smokeless tobacco: Never Used    Alcohol use No       Allergies:  No Known Allergies      Review of Systems   Review of Systems   Constitutional: Negative. Negative for activity change, appetite change, chills, fatigue, fever and unexpected weight change. HENT: Negative. Negative for congestion, hearing loss, rhinorrhea, sneezing and voice change. Eyes: Negative. Negative for pain and visual disturbance. Respiratory: Negative. Negative for apnea, cough, choking, chest tightness and shortness of breath. Cardiovascular: Positive for chest pain. Negative for palpitations. Gastrointestinal: Positive for constipation, nausea and vomiting. Negative for abdominal distention, abdominal pain, blood in stool and diarrhea. Genitourinary: Negative. Negative for difficulty urinating, flank pain, frequency and urgency. No discharge   Musculoskeletal: Negative.   Negative for arthralgias, back pain, myalgias and neck stiffness. Skin: Negative. Negative for color change and rash. Neurological: Positive for headaches. Negative for dizziness, seizures, syncope, speech difficulty, weakness and numbness. Hematological: Negative for adenopathy. Psychiatric/Behavioral: Negative. Negative for agitation, behavioral problems, dysphoric mood and suicidal ideas. The patient is not nervous/anxious. Physical Exam   Physical Exam   Constitutional: She is oriented to person, place, and time. She appears well-developed and well-nourished. No distress. HENT:   Head: Normocephalic and atraumatic. Mouth/Throat: Oropharynx is clear and moist. No oropharyngeal exudate. Eyes: Conjunctivae and EOM are normal. Pupils are equal, round, and reactive to light. Right eye exhibits no discharge. Left eye exhibits no discharge. Neck: Normal range of motion. Neck supple. Cardiovascular: Normal rate, regular rhythm and intact distal pulses. Exam reveals no gallop and no friction rub. No murmur heard. Pulmonary/Chest: Effort normal and breath sounds normal. No respiratory distress. She has no wheezes. She has no rales. She exhibits no tenderness. Abdominal: Soft. Bowel sounds are normal. She exhibits no distension and no mass. There is no tenderness. There is no rebound and no guarding. Musculoskeletal: Normal range of motion. She exhibits no edema. Lymphadenopathy:     She has no cervical adenopathy. Neurological: She is alert and oriented to person, place, and time. No cranial nerve deficit. Coordination normal.   Skin: Skin is warm and dry. No rash noted. No erythema. Psychiatric: She has a normal mood and affect. Nursing note and vitals reviewed.       Diagnostic Study Results     Labs -     Recent Results (from the past 12 hour(s))   EKG, 12 LEAD, INITIAL    Collection Time: 08/20/18  5:17 PM   Result Value Ref Range    Ventricular Rate 75 BPM    Atrial Rate 75 BPM    P-R Interval 124 ms    QRS Duration 80 ms    Q-T Interval 382 ms    QTC Calculation (Bezet) 426 ms    Calculated P Axis 60 degrees    Calculated R Axis 68 degrees    Calculated T Axis 48 degrees    Diagnosis       Normal sinus rhythm with sinus arrhythmia  Normal ECG  When compared with ECG of 01-JAN-2016 18:22,  Previous ECG has undetermined rhythm, needs review     SAMPLES BEING HELD    Collection Time: 08/20/18  5:48 PM   Result Value Ref Range    SAMPLES BEING HELD        Add-on orders for these samples will be processed based on acceptable specimen integrity and analyte stability, which may vary by analyte. COMMENT        Add-on orders for these samples will be processed based on acceptable specimen integrity and analyte stability, which may vary by analyte. CBC WITH AUTOMATED DIFF    Collection Time: 08/20/18  5:48 PM   Result Value Ref Range    WBC 17.7 (H) 3.6 - 11.0 K/uL    RBC 4.82 3.80 - 5.20 M/uL    HGB 14.7 11.5 - 16.0 g/dL    HCT 42.5 35.0 - 47.0 %    MCV 88.2 80.0 - 99.0 FL    MCH 30.5 26.0 - 34.0 PG    MCHC 34.6 30.0 - 36.5 g/dL    RDW 11.8 11.5 - 14.5 %    PLATELET 474 (H) 624 - 400 K/uL    MPV 9.3 8.9 - 12.9 FL    NRBC 0.0 0  WBC    ABSOLUTE NRBC 0.00 0.00 - 0.01 K/uL    NEUTROPHILS 78 (H) 32 - 75 %    LYMPHOCYTES 16 12 - 49 %    MONOCYTES 6 5 - 13 %    EOSINOPHILS 0 0 - 7 %    BASOPHILS 0 0 - 1 %    IMMATURE GRANULOCYTES 1 (H) 0.0 - 0.5 %    ABS. NEUTROPHILS 13.8 (H) 1.8 - 8.0 K/UL    ABS. LYMPHOCYTES 2.8 0.8 - 3.5 K/UL    ABS. MONOCYTES 1.0 0.0 - 1.0 K/UL    ABS. EOSINOPHILS 0.0 0.0 - 0.4 K/UL    ABS. BASOPHILS 0.1 0.0 - 0.1 K/UL    ABS. IMM.  GRANS. 0.1 (H) 0.00 - 0.04 K/UL    DF AUTOMATED     METABOLIC PANEL, COMPREHENSIVE    Collection Time: 08/20/18  5:48 PM   Result Value Ref Range    Sodium 138 136 - 145 mmol/L    Potassium 3.6 3.5 - 5.1 mmol/L    Chloride 104 97 - 108 mmol/L    CO2 27 21 - 32 mmol/L    Anion gap 7 5 - 15 mmol/L    Glucose 75 65 - 100 mg/dL    BUN 6 6 - 20 MG/DL    Creatinine 0.80 0.55 - 1.02 MG/DL BUN/Creatinine ratio 8 (L) 12 - 20      GFR est AA >60 >60 ml/min/1.73m2    GFR est non-AA >60 >60 ml/min/1.73m2    Calcium 9.8 8.5 - 10.1 MG/DL    Bilirubin, total 0.2 0.2 - 1.0 MG/DL    ALT (SGPT) 23 12 - 78 U/L    AST (SGOT) 17 15 - 37 U/L    Alk. phosphatase 133 (H) 45 - 117 U/L    Protein, total 9.5 (H) 6.4 - 8.2 g/dL    Albumin 4.6 3.5 - 5.0 g/dL    Globulin 4.9 (H) 2.0 - 4.0 g/dL    A-G Ratio 0.9 (L) 1.1 - 2.2     MAGNESIUM    Collection Time: 08/20/18  5:48 PM   Result Value Ref Range    Magnesium 2.4 1.6 - 2.4 mg/dL   PROTHROMBIN TIME + INR    Collection Time: 08/20/18  5:48 PM   Result Value Ref Range    INR 1.0 0.9 - 1.1      Prothrombin time 10.4 9.0 - 11.1 sec   TROPONIN I    Collection Time: 08/20/18  5:48 PM   Result Value Ref Range    Troponin-I, Qt. <0.05 <0.05 ng/mL   GLUCOSE, POC    Collection Time: 08/20/18  5:57 PM   Result Value Ref Range    Glucose (POC) 91 65 - 100 mg/dL    Performed by Liudmilaapolinar Cabezas        Radiologic Studies -   XR ABD ACUTE W 1 V CHEST   Final Result        CT Results  (Last 48 hours)    None        CXR Results  (Last 48 hours)               08/20/18 1806  XR ABD ACUTE W 1 V CHEST Final result    Impression:  IMPRESSION:    1. The lungs are clear. 2. No obstruction or free air. Narrative:  EXAM:  XR ABD ACUTE W 1 V CHEST       INDICATION:  Abdominal Pain       COMPARISON: Chest 1/1/2016. FINDINGS: The upright chest radiograph demonstrates clear lungs and normal   cardiac and mediastinal contours. There is no pleural effusion or free air under   the diaphragm. Supine and upright views of the abdomen demonstrate a nonobstructive bowel gas   pattern. There is no free intraperitoneal air. No soft tissue masses or   pathologic calcifications are identified. The bones are within normal limits. Medical Decision Making   I am the first provider for this patient.     I reviewed the vital signs, available nursing notes, past medical history, past surgical history, family history and social history. Vital Signs-Reviewed the patient's vital signs. Patient Vitals for the past 12 hrs:   Temp Pulse Resp BP SpO2   08/20/18 1712 98.3 °F (36.8 °C) 73 18 156/86 100 %       Pulse Oximetry Analysis - 100% on RA    Cardiac Monitor:   Rate: 73 bpm  Rhythm: Normal Sinus Rhythm with sinus arrhythmia 1712     EKG interpretation: (Preliminary) 1717  Rhythm: normal sinus rhythm; with sinus arrythmia. Rate (approx.): 75; Axis: normal; MN interval: normal; QRS interval: normal ; ST/T wave: normal; Other findings: normal.  Written by Lucrecia Roe ED Scribe, as dictated by Susan Mario. Lucia Dorantes MD.    Records Reviewed: Nursing Notes and Old Medical Records    Provider Notes (Medical Decision Making):   DDx: viral syndrome, electrolyte abnormality, UTI, constipation    ED Course:   Initial assessment performed. The patients presenting problems have been discussed, and they are in agreement with the care plan formulated and outlined with them. I have encouraged them to ask questions as they arise throughout their visit. Progress Note:  6:53 PM  Pt believes that anxiety is causing her sxs. Critical Care Time:   0 minutes    Disposition:  Discharge Note:  7:06 PM  The pt is ready for discharge. The pt's signs, symptoms, diagnosis, and discharge instructions have been discussed and pt has conveyed their understanding. The pt is to follow up as recommended or return to ER should their symptoms worsen. Plan has been discussed and pt is in agreement. PLAN:  1. Current Discharge Medication List      START taking these medications    Details   ALPRAZolam (XANAX) 0.25 mg tablet Take 1 Tab by mouth every eight (8) hours as needed for Anxiety. Max Daily Amount: 0.75 mg. Qty: 12 Tab, Refills: 0    Associated Diagnoses: Anxiety attack           2.    Follow-up Information     Follow up With Details Comments Contact Info    Rae Traore NP Call in 2 days  [address] and [facility]  [place] [address]  [phone]          Return to ED if worse     Diagnosis     Clinical Impression:   1. Anxiety attack        Attestations: This note is prepared by Patric Roe, acting as Scribe for [employer] Noris Hall, [address] [facility] Noris Hall MD: The scribe's documentation has been prepared under my direction and personally reviewed by me in its entirety. I confirm that the note above accurately reflects all work, treatment, procedures, and medical decision making performed by me.

## 2018-08-20 NOTE — ED TRIAGE NOTES
Assumed care of pt from triage. Pt is A&O x 4. Pt reports CC of chest pain with N/V since thurs. Pt was seen here at ED on Friday for same and Dx with virus. Pt placed on monitor x 3. VSS. Pt requesting to have sugar tested because she feels like it is low. Pt reports she has not been able to eat in 4 days. Pt reports she has been taking nausea meds as prescribed.

## 2018-08-20 NOTE — DISCHARGE INSTRUCTIONS
Panic Attacks: Care Instructions  Your Care Instructions    During a panic attack, you may have a feeling of intense fear or terror, trouble breathing, chest pain or tightness, heartbeat changes, dizziness, sweating, and shaking. A panic attack starts suddenly and usually lasts from 5 to 20 minutes but may last even longer. You have the most anxiety about 10 minutes after the attack starts. An attack can begin with a stressful event, or it can happen without a cause. Although panic attacks can cause scary symptoms, you can learn to manage them with self-care, counseling, and medicine. Follow-up care is a key part of your treatment and safety. Be sure to make and go to all appointments, and call your doctor if you are having problems. It's also a good idea to know your test results and keep a list of the medicines you take. How can you care for yourself at home? · Take your medicine exactly as directed. Call your doctor if you think you are having a problem with your medicine. · Go to your counseling sessions and follow-up appointments. · Recognize and accept your anxiety. Then, when you are in a situation that makes you anxious, say to yourself, \"This is not an emergency. I feel uncomfortable, but I am not in danger. I can keep going even if I feel anxious. \"  · Be kind to your body:  ¨ Relieve tension with exercise or a massage. ¨ Get enough rest.  ¨ Avoid alcohol, caffeine, nicotine, and illegal drugs. They can increase your anxiety level, cause sleep problems, or trigger a panic attack. ¨ Learn and do relaxation techniques. See below for more about these techniques. · Engage your mind. Get out and do something you enjoy. Go to a funny movie, or take a walk or hike. Plan your day. Having too much or too little to do can make you anxious. · Keep a record of your symptoms. Discuss your fears with a good friend or family member, or join a support group for people with similar problems.  Talking to others sometimes relieves stress. · Get involved in social groups, or volunteer to help others. Being alone sometimes makes things seem worse than they are. · Get at least 30 minutes of exercise on most days of the week to relieve stress. Walking is a good choice. You also may want to do other activities, such as running, swimming, cycling, or playing tennis or team sports. Relaxation techniques  Do relaxation exercises for 10 to 20 minutes a day. You can play soothing, relaxing music while you do them, if you wish. · Tell others in your house that you are going to do your relaxation exercises. Ask them not to disturb you. · Find a comfortable place, away from all distractions and noise. · Lie down on your back, or sit with your back straight. · Focus on your breathing. Make it slow and steady. · Breathe in through your nose. Breathe out through either your nose or mouth. · Breathe deeply, filling up the area between your navel and your rib cage. Breathe so that your belly goes up and down. · Do not hold your breath. · Breathe like this for 5 to 10 minutes. Notice the feeling of calmness throughout your whole body. As you continue to breathe slowly and deeply, relax by doing the following for another 5 to 10 minutes:  · Tighten and relax each muscle group in your body. You can begin at your toes and work your way up to your head. · Imagine your muscle groups relaxing and becoming heavy. · Empty your mind of all thoughts. · Let yourself relax more and more deeply. · Become aware of the state of calmness that surrounds you. · When your relaxation time is over, you can bring yourself back to alertness by moving your fingers and toes and then your hands and feet and then stretching and moving your entire body. Sometimes people fall asleep during relaxation, but they usually wake up shortly afterward.   · Always give yourself time to return to full alertness before you drive a car or do anything that might cause an accident if you are not fully alert. Never play a relaxation tape while driving a car. When should you call for help? Call 911 anytime you think you may need emergency care. For example, call if:    · You feel you cannot stop from hurting yourself or someone else.    Watch closely for changes in your health, and be sure to contact your doctor if:    · Your panic attacks get worse.     · You have new or different anxiety.     · You are not getting better as expected. Where can you learn more? Go to http://yulia-amilcar.info/. Enter H601 in the search box to learn more about \"Panic Attacks: Care Instructions. \"  Current as of: December 7, 2017  Content Version: 11.7  © 9668-9157 [employer]. Care instructions adapted under license by [employer] (which disclaims liability or warranty for this information). If you have questions about a medical condition or this instruction, always ask your healthcare professional. [place] [address] any warranty or liability for your use of this information.

## 2018-08-21 ENCOUNTER — OFFICE VISIT (OUTPATIENT)
Dept: INTERNAL MEDICINE CLINIC | Age: 28
End: 2018-08-21

## 2018-08-21 VITALS
TEMPERATURE: 98.1 F | HEIGHT: 61 IN | WEIGHT: 154 LBS | SYSTOLIC BLOOD PRESSURE: 138 MMHG | DIASTOLIC BLOOD PRESSURE: 80 MMHG | BODY MASS INDEX: 29.07 KG/M2 | OXYGEN SATURATION: 98 % | RESPIRATION RATE: 16 BRPM | HEART RATE: 83 BPM

## 2018-08-21 DIAGNOSIS — G43.009 MIGRAINE WITHOUT AURA AND WITHOUT STATUS MIGRAINOSUS, NOT INTRACTABLE: Primary | ICD-10-CM

## 2018-08-21 DIAGNOSIS — D75.839 THROMBOCYTOSIS: ICD-10-CM

## 2018-08-21 DIAGNOSIS — E16.2 HYPOGLYCEMIA: ICD-10-CM

## 2018-08-21 DIAGNOSIS — D72.829 LEUKOCYTOSIS, UNSPECIFIED TYPE: ICD-10-CM

## 2018-08-21 DIAGNOSIS — Z87.891 EX-SMOKER: ICD-10-CM

## 2018-08-21 DIAGNOSIS — F41.9 ANXIETY: ICD-10-CM

## 2018-08-21 DIAGNOSIS — R21 RASH AND NONSPECIFIC SKIN ERUPTION: ICD-10-CM

## 2018-08-21 DIAGNOSIS — J45.990 EXERCISE-INDUCED ASTHMA: ICD-10-CM

## 2018-08-21 LAB
ATRIAL RATE: 75 BPM
BILIRUB UR QL STRIP: NEGATIVE
CALCULATED P AXIS, ECG09: 60 DEGREES
CALCULATED R AXIS, ECG10: 68 DEGREES
CALCULATED T AXIS, ECG11: 48 DEGREES
DIAGNOSIS, 93000: NORMAL
GLUCOSE UR-MCNC: NEGATIVE MG/DL
HBA1C MFR BLD HPLC: 5.1 %
KETONES P FAST UR STRIP-MCNC: NEGATIVE MG/DL
P-R INTERVAL, ECG05: 124 MS
PH UR STRIP: 7.5 [PH] (ref 4.6–8)
PROT UR QL STRIP: NEGATIVE
Q-T INTERVAL, ECG07: 382 MS
QRS DURATION, ECG06: 80 MS
QTC CALCULATION (BEZET), ECG08: 426 MS
SP GR UR STRIP: 1.01 (ref 1–1.03)
UA UROBILINOGEN AMB POC: NORMAL (ref 0.2–1)
URINALYSIS CLARITY POC: CLEAR
URINALYSIS COLOR POC: YELLOW
URINE BLOOD POC: NEGATIVE
URINE LEUKOCYTES POC: NEGATIVE
URINE NITRITES POC: NEGATIVE
VENTRICULAR RATE, ECG03: 75 BPM

## 2018-08-21 RX ORDER — ONDANSETRON 4 MG/1
4 TABLET, FILM COATED ORAL
COMMUNITY
End: 2018-08-28

## 2018-08-21 NOTE — PROGRESS NOTES
Exam Room 7    [facility] 34 [facility]    Chief Complaint   Patient presents with    New Patient     Per patient glucose numbers have been low    Anxiety    Depression     1. Have you been to the ER, urgent care clinic since your last visit? Hospitalized since your last visit? Yes When: 8/17 and 8/20/2018 Where: [address] Reason for visit: Nausea and Vomiting    2. Have you seen or consulted any other health care providers outside of the [address] [place] [address] since your last visit? Include any pap smears or colon screening.  No    Health Maintenance Due   Topic Date Due    HPV Age 9Y-34Y (1 of 3 - Female 3 Dose Series) 03/03/2003    Pneumococcal 19-64 Medium Risk (1 of 1 - PPSV23) 03/03/2011    DTaP/Tdap/Td series (1 - Tdap) 03/03/2013    PAP AKA CERVICAL CYTOLOGY  03/03/2013    Influenza Age 9 to Adult  08/01/2018

## 2018-08-21 NOTE — PROGRESS NOTES
HISTORY OF PRESENT ILLNESS  Shu Grimm is a 34 y.o. female presents with mother to establish care  HPI  ED visit twice last week with nausea, vomiting, headache and anxiety    Appointment tomorrow with Erik Munoz psychiatrist     Cholecystectomy June 2018    diagnosed with bipolar disorder, manic depression,  anxiety, ADHD     dystolic blood pressure usually low. Hx of cardiac murmur, remote echo    Current anxiety, overthinking,     Feels like burden to family    Last PCP, Dr Lakshmi Holland, did not believe she needed medical management of psychiatric disorder    Trouble focusing and with public speaking,      Hard time sleeping     Mother notes labile mood    Migraine a lot since teen, better over the years, now more tension headache    Not currently working, not able to hold a steady job, gets aggravated, upset and overwhelmed when she works     Asthma; uses rescue inhaler twice daily, cannot afford symbicort    Quit smoking 1 year ago    Menses regular     Nulligravida       Past Medical History:   Diagnosis Date    Asthma     Bipolar disorder (manic depression) ([address] 2007    Community acquired pneumonia     GERD (gastroesophageal reflux disease)     Headache(784.0)     Migraine    Heart murmur 01/1993    Lung nodule     Neurological disorder     migraines    Second hand smoke exposure      Past Surgical History:   Procedure Laterality Date    HX CHOLECYSTECTOMY      HX HEENT      wisdom teeth extraction    HX HEENT      BMWT     No current outpatient prescriptions on file prior to visit. No current facility-administered medications on file prior to visit.       Family History   Problem Relation Age of Onset    Diabetes Father     Heart Disease Father     Hypertension Father     Anxiety Father     COPD Father     Diabetes Mother     Hypertension Mother     Anxiety Mother     COPD Mother     Diabetes Paternal Grandmother          Review of Systems   Constitutional: Positive for malaise/fatigue. HENT: Negative. Eyes: Negative. Respiratory: Positive for cough, shortness of breath and wheezing. Negative for hemoptysis and sputum production. Cardiovascular: Positive for chest pain and palpitations. Gastrointestinal: Negative. Genitourinary: Negative. Musculoskeletal: Negative. Skin: Positive for itching and rash (recurrent). Neurological: Positive for headaches. Negative for dizziness. Psychiatric/Behavioral: Positive for depression. Negative for hallucinations, substance abuse and suicidal ideas. The patient is nervous/anxious and has insomnia. /80 (BP 1 Location: Right arm, BP Patient Position: Sitting)  Pulse 83  Temp 98.1 °F (36.7 °C) (Oral)   Resp 16  Ht 5' 1.06\" (1.551 m)  Wt 154 lb (69.9 kg)  LMP 08/03/2018  SpO2 98%  BMI 29.04 kg/m2  Physical Exam   Constitutional: She is oriented to person, place, and time. She appears well-developed and well-nourished. No distress. Cardiovascular: Normal rate, regular rhythm and normal heart sounds. Pulmonary/Chest: Effort normal and breath sounds normal. No respiratory distress. She has no wheezes. She has no rales. She exhibits no tenderness. Musculoskeletal: She exhibits no edema, tenderness or deformity. Neurological: She is alert and oriented to person, place, and time. No cranial nerve deficit. Skin: Skin is warm and dry. No rash noted. She is not diaphoretic. No erythema. Psychiatric: Her behavior is normal. Judgment and thought content normal. Her mood appears anxious. Her speech is rapid and/or pressured and tangential. Cognition and memory are normal.       ASSESSMENT and PLAN    ICD-10-CM ICD-9-CM    1. Migraine without aura and without status migrainosus, not intractable G43.009 346.10    2. Anxiety F41.9 300.00 TSH RFX ON ABNORMAL TO FREE T4   3. Exercise-induced asthma J45.990 493.81    4.  Thrombocytosis (HCC) D47.3 238.71 SED RATE (ESR)      C REACTIVE PROTEIN, QT      FERNANDO IFA W/REFLEX  CASCADE      RA + CCP ABS   5. Hypoglycemia E16.2 251.2 AMB POC HEMOGLOBIN A1C      AMB POC URINALYSIS DIP STICK AUTO W/O MICRO   6. Rash and nonspecific skin eruption R21 782.1 SED RATE (ESR)      C REACTIVE PROTEIN, QT      FERNANDO IFA W/REFLEX  CASCADE      RA + CCP ABS   7. Ex-smoker Z87.891 V15.82    8. Leukocytosis, unspecified type D72.829 288.60 SED RATE (ESR)      C REACTIVE PROTEIN, QT      FERNANDO IFA W/REFLEX  CASCADE      RA + CCP ABS     Follow-up Disposition:  Return in about 4 weeks (around 9/18/2018) for pap smear. lab results and schedule of future lab studies reviewed with patient  reviewed diet, exercise and weight control  reviewed medications and side effects in detail    Discussed the patient's BMI with her. The BMI follow up plan is as follows:     dietary management education, guidance, and counseling  encourage exercise  monitor weight  prescribed dietary intake    Patient voices understanding and acceptance of this advice and will call back if any further questions or concerns. An After Visit Summary was printed and given to the patient.

## 2018-08-21 NOTE — MR AVS SNAPSHOT
[address]  [address] [place] [address] 
[phone] Patient: Alfred Casillas MRN: [id number] FYA:[id number] Visit Information Date & Time Provider Department Dept. Phone Encounter #  
 8/21/2018  8:30 AM Shane Fam NP [facility] [phone] [id number] Follow-up Instructions Return in about 4 weeks (around 9/18/2018) for pap smear. Your Appointments 8/22/2018 11:00 AM  
New Patient with Jaret Quevedo MD  
11 [facility] ([address]) Appt Note: Referred by the [address] [facility] [address]  
[phone] [address] [facility] [address] Upcoming Health Maintenance Date Due  
 HPV Age 9Y-34Y (1 of 3 - Female 3 Dose Series) 3/3/2003 Pneumococcal 19-64 Medium Risk (1 of 1 - PPSV23) 3/3/2011 DTaP/Tdap/Td series (1 - Tdap) 3/3/2013 PAP AKA CERVICAL CYTOLOGY 3/3/2013 Influenza Age 5 to Adult 8/1/2018 Allergies as of 8/21/2018  Review Complete On: 8/21/2018 By: Charlie Guan LPN No Known Allergies Current Immunizations  Never Reviewed No immunizations on file. Not reviewed this visit You Were Diagnosed With   
  
 Codes Comments Hypoglycemia    -  Primary ICD-10-CM: E16.2 ICD-9-CM: 251.2 Anxiety     ICD-10-CM: F41.9 ICD-9-CM: 300.00 Thrombocytosis ([facility] [address]     ICD-10-CM: D47.3 ICD-9-CM: 238.71 Leukocytosis, unspecified type     ICD-10-CM: D72.829 ICD-9-CM: 288.60 Migraine without aura and without status migrainosus, not intractable     ICD-10-CM: Z02.559 ICD-9-CM: 346.10 Rash and nonspecific skin eruption     ICD-10-CM: R21 
ICD-9-CM: 782.1 Ex-smoker     ICD-10-CM: A24.140 ICD-9-CM: V15.82 Exercise-induced asthma     ICD-10-CM: J45.990 ICD-9-CM: 493.81 Vitals BP Pulse Temp Resp Height(growth percentile) Weight(growth percentile) 138/80 (BP 1 Location: Right arm, BP Patient Position: Sitting) 83 98.1 °F (36.7 °C) (Oral) 16 5' 1.06\" (1.551 m) 154 lb (69.9 kg) LMP SpO2 BMI OB Status Smoking Status 08/03/2018 98% 29.04 kg/m2 Having regular periods Former Smoker Vitals History BMI and BSA Data Body Mass Index Body Surface Area 29.04 kg/m 2 1.74 m 2 Preferred Pharmacy Pharmacy Name Phone [phone] Your Updated Medication List  
  
   
This list is accurate as of 8/21/18  9:09 AM.  Always use your most recent med list.  
  
  
  
  
 ALPRAZolam 0.25 mg tablet Commonly known as:  [place] Denise Take 1 Tab by mouth every eight (8) hours as needed for Anxiety. Max Daily Amount: 0.75 mg.  
  
 promethazine 25 mg tablet Commonly known as:  PHENERGAN Take 1 Tab by mouth every six (6) hours as needed for Nausea. ZOFRAN 4 mg tablet Generic drug:  ondansetron hcl Take 4 mg by mouth every eight (8) hours as needed for Nausea. We Performed the Following AMB POC HEMOGLOBIN A1C [15626 CPT(R)] AMB POC URINALYSIS DIP STICK AUTO W/O MICRO [24689 CPT(R)] FERNANDO IFA W/REFLEX  CASCADE [35778 CPT(R)] C REACTIVE PROTEIN, QT [94620 CPT(R)]   
 RA + CCP ABS [[id number] Custom] SED RATE (ESR) [id number] CPT(R)] TSH RFX ON ABNORMAL TO FREE T4 [[id number] Custom] Follow-up Instructions Return in about 4 weeks (around 9/18/2018) for pap smear. Introducing [facility]! Dear Trilby Cabot: 
Thank you for requesting a [employer] account. Our records indicate that you already have an active [employer] account. You can access your account anytime at [email or web address] Did you know that you can access your hospital and ER discharge instructions at any time in [employer]ogy?   You can also review all of your test results from your hospital stay or ER visit. Additional Information If you have questions, please visit the Frequently Asked Questions section of the [employer] website at [email or web address] Remember, [employer] is NOT to be used for urgent needs. For medical emergencies, dial 911. Now available from your iPhone and Android! Please provide this summary of care documentation to your next provider. Your primary care clinician is listed as Javier Soliman. If you have any questions after today's visit, please call [phone].

## 2018-08-22 ENCOUNTER — OFFICE VISIT (OUTPATIENT)
Dept: BEHAVIORAL/MENTAL HEALTH CLINIC | Age: 28
End: 2018-08-22

## 2018-08-22 VITALS
WEIGHT: 154 LBS | BODY MASS INDEX: 28.34 KG/M2 | HEIGHT: 62 IN | SYSTOLIC BLOOD PRESSURE: 143 MMHG | HEART RATE: 74 BPM | DIASTOLIC BLOOD PRESSURE: 84 MMHG

## 2018-08-22 DIAGNOSIS — G47.00 INSOMNIA DISORDER WITH NON-SLEEP DISORDER MENTAL COMORBIDITY: Primary | ICD-10-CM

## 2018-08-22 DIAGNOSIS — F41.3 OTHER MIXED ANXIETY DISORDERS: ICD-10-CM

## 2018-08-22 DIAGNOSIS — F50.81 MILD BINGE-EATING DISORDER: ICD-10-CM

## 2018-08-22 DIAGNOSIS — F34.1 DYSTHYMIA: ICD-10-CM

## 2018-08-22 RX ORDER — SERTRALINE HYDROCHLORIDE 25 MG/1
TABLET, FILM COATED ORAL
Qty: 60 TAB | Refills: 1 | Status: SHIPPED | OUTPATIENT
Start: 2018-08-22 | End: 2018-08-28

## 2018-08-22 RX ORDER — TRAZODONE HYDROCHLORIDE 50 MG/1
TABLET ORAL
Qty: 30 TAB | Refills: 1 | Status: SHIPPED | OUTPATIENT
Start: 2018-08-22 | End: 2018-10-19 | Stop reason: SDUPTHER

## 2018-08-22 RX ORDER — LORAZEPAM 0.5 MG/1
0.5 TABLET ORAL 2 TIMES DAILY
Qty: 60 TAB | Refills: 1 | Status: SHIPPED | OUTPATIENT
Start: 2018-08-22 | End: 2018-09-19 | Stop reason: DRUGHIGH
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
## 2018-08-22 NOTE — PATIENT INSTRUCTIONS
Insomnia: Care Instructions  Your Care Instructions    Insomnia is the inability to sleep well. It is a common problem for most people at some time. Insomnia may make it hard for you to get to sleep, stay asleep, or sleep as long as you need to. This can make you tired and grouchy during the day. It can also make you forgetful, less effective at work, and unhappy. Insomnia can be caused by conditions such as depression or anxiety. Pain can also affect your ability to sleep. When these problems are solved, the insomnia usually clears up. But sometimes bad sleep habits can cause insomnia. If insomnia is affecting your work or your enjoyment of life, you can take steps to improve your sleep. Follow-up care is a key part of your treatment and safety. Be sure to make and go to all appointments, and call your doctor if you are having problems. It's also a good idea to know your test results and keep a list of the medicines you take. How can you care for yourself at home? What to avoid  · Do not have drinks with caffeine, such as coffee or black tea, for 8 hours before bed. · Do not smoke or use other types of tobacco near bedtime. Nicotine is a stimulant and can keep you awake. · Avoid drinking alcohol late in the evening, because it can cause you to wake in the middle of the night. · Do not eat a big meal close to bedtime. If you are hungry, eat a light snack. · Do not drink a lot of water close to bedtime, because the need to urinate may wake you up during the night. · Do not read or watch TV in bed. Use the bed only for sleeping and sexual activity. What to try  · Go to bed at the same time every night, and wake up at the same time every morning. Do not take naps during the day. · Keep your bedroom quiet, dark, and cool. · Sleep on a comfortable pillow and mattress. · If watching the clock makes you anxious, turn it facing away from you so you cannot see the time.   · If you worry when you lie down, start a worry book. Well before bedtime, write down your worries, and then set the book and your concerns aside. · Try meditation or other relaxation techniques before you go to bed. · If you cannot fall asleep, get up and go to another room until you feel sleepy. Do something relaxing. Repeat your bedtime routine before you go to bed again. · Make your house quiet and calm about an hour before bedtime. Turn down the lights, turn off the TV, log off the computer, and turn down the volume on music. This can help you relax after a busy day. When should you call for help? Watch closely for changes in your health, and be sure to contact your doctor if:    · Your efforts to improve your sleep do not work.     · Your insomnia gets worse.     · You have been feeling down, depressed, or hopeless or have lost interest in things that you usually enjoy. Where can you learn more? Go to http://yuliaZaarlyamilcar.info/. Enter P513 in the search box to learn more about \"Insomnia: Care Instructions. \"  Current as of: October 10, 2017  Content Version: 11.7  © 1877-7437 [employer]. Care instructions adapted under license by [employer] (which disclaims liability or warranty for this information). If you have questions about a medical condition or this instruction, always ask your healthcare professional. [place] [address] any warranty or liability for your use of this information. Bulimia: Care Instructions  Your Care Instructions  Bulimia nervosa is an eating disorder. People with bulimia are very concerned about body shape and size and are afraid of gaining weight. They may crave food and find ways to eat a lot of it fast. This binge eating is often set off by stress or an emotional upset. After overeating, people with bulimia may feel guilty, uncomfortable, or ashamed.  They may vomit, use laxatives, or exercise excessively to get rid of the food they ate.  Counseling to understand the condition and to learn ways to reduce stress is a big part of treatment for bulimia. Nutritional counseling can help you learn how to eat a healthy diet. It may help to have your family take part in family counseling so that they can support you. Treatment with medicines such as antidepressants also can help. Follow-up care is a key part of your treatment and safety. Be sure to make and go to all appointments, and call your doctor if you are having problems. It's also a good idea to know your test results and keep a list of the medicines you take. How can you care for yourself at home? Follow your treatment plan  · Take your medicines exactly as prescribed. Call your doctor if you think you are having a problem with your medicine. · Go to your counseling sessions. Call or talk with your counselor if you cannot attend or you think the sessions are not helping. Do not just stop going. Learn to be easier on yourself  · Remember that feeling bad about yourself and not having hope is part of your condition. As you work with your doctor and counselors, you will start to feel better about yourself. · Make a list of things you have learned, things you have done that were hard for you, or things you have changed about yourself. · Do not blame yourself for having bulimia. Just work on getting better. · Learn to accept help. · Remember that your goal is to feel better each day. Take good care of yourself  · Get enough sleep. Keep your room dark and quiet, and try to go to bed at the same time every night. · Try to lower your stress. Manage your time, build a strong system of social support, and lead a healthy lifestyle. To lower your stress, try physical activity, slow deep breathing, relaxing your muscles, or getting a massage. · Do not use alcohol or illegal drugs. · Learn the early signs of sudden, urgent food cravings. · Eat a healthy, balanced diet.  A balanced diet includes whole grains, dairy, fruits and vegetables, and protein. Eat a variety of foods from each of these groups so that you get all the nutrients you need. When should you call for help? Call 911 anytime you think you may need emergency care. For example, call if:    · You feel hopeless or have thoughts of hurting yourself.     · You cough up blood.     · You vomit blood or what looks like coffee grounds.     · You pass reddish brown or very bloody stools.    Call your doctor now or seek immediate medical care if:    · You have pain in your belly.     · You have an irregular heartbeat.     · You feel dizzy or faint.    Watch closely for changes in your health, and be sure to contact your doctor if you have any problems. Where can you learn more? Go to http://yulia-amilcar.info/. Enter R909 in the search box to learn more about \"Bulimia: Care Instructions. \"  Current as of: December 7, 2017  Content Version: 11.7  © 9462-7846 [employer]. Care instructions adapted under license by [employer] (which disclaims liability or warranty for this information). If you have questions about a medical condition or this instruction, always ask your healthcare professional. [address] any warranty or liability for your use of this information.

## 2018-08-22 NOTE — MR AVS SNAPSHOT
[address] [facility] [address] [place] [address] 
[phone] Patient: Chucky Mcqueen MRN: [id number] ILT:[id number] Visit Information Date & Time Provider Department Dept. Phone Encounter #  
 8/22/2018 11:00 AM Porfirio Mcintosh MD [facility] [phone] [id number] Your Appointments 9/4/2018  1:40 PM  
New Patient with Aubrey Moore MD  
[facility] ([address] [place] [address]) Appt Note: self ref Dx. Mumur/Records in cc; 11/16/17- lm to cb and r/s appt- ap; ER f/u for heart murmur  
 [address] [facility] [address] [facility]  
[address] [facility]  
  
    
 9/21/2018 11:30 AM  
PAP with Angela Burnham NP [facility] ([address] [place] [address]) Appt Note: 4 week f/up for Pap [address] [facility] [address] [facility] [address]  
[facility] [address] [facility] [address] Upcoming Health Maintenance Date Due  
 HPV Age 9Y-34Y (1 of 3 - Female 3 Dose Series) 3/3/2003 Pneumococcal 19-64 Highest Risk (1 of 3 - PCV13) 3/3/2011 DTaP/Tdap/Td series (1 - Tdap) 3/3/2013 PAP AKA CERVICAL CYTOLOGY 3/3/2013 Influenza Age 5 to Adult 8/1/2018 Allergies as of 8/22/2018  Review Complete On: 8/22/2018 By: Porfirio Mcintosh MD  
 No Known Allergies Current Immunizations  Never Reviewed No immunizations on file. Not reviewed this visit You Were Diagnosed With   
  
 Codes Comments Insomnia disorder with non-sleep disorder mental comorbidity    -  Primary ICD-10-CM: G47.00 ICD-9-CM: 780.52 Other mixed anxiety disorders     ICD-10-CM: F41.3 ICD-9-CM: 300.09 Dysthymia     ICD-10-CM: F34.1 ICD-9-CM: 300.4 Mild binge-eating disorder     ICD-10-CM: F50.81 ICD-9-CM: 307.1 Vitals BP Pulse Height(growth percentile) Weight(growth percentile) LMP BMI  
 143/84 74 5' 1.6\" (1.565 m) 154 lb (69.9 kg) 08/03/2018 28.53 kg/m2 OB Status Smoking Status Having regular periods Former Smoker BMI and BSA Data Body Mass Index Body Surface Area 28.53 kg/m 2 1.74 m 2 Preferred Pharmacy Pharmacy Name Phone [id number] [phone] Your Updated Medication List  
  
   
This list is accurate as of 8/22/18 12:12 PM.  Always use your most recent med list.  
  
  
  
  
 promethazine 25 mg tablet Commonly known as:  PHENERGAN Take 1 Tab by mouth every six (6) hours as needed for Nausea. sertraline 25 mg tablet Commonly known as:  ZOLOFT Take 1/2 tablet in the evening for 5 days, then take 1 whole tablet each evening for 5 days, then take 2 tablets thereafter  Indications: ANXIETY WITH DEPRESSION, Binge Eating Disorder ZOFRAN 4 mg tablet Generic drug:  ondansetron hcl Take 4 mg by mouth every eight (8) hours as needed for Nausea. Prescriptions Printed Refills  
 sertraline (ZOLOFT) 25 mg tablet 1 Sig: Take 1/2 tablet in the evening for 5 days, then take 1 whole tablet each evening for 5 days, then take 2 tablets thereafter  Indications: ANXIETY WITH DEPRESSION, Binge Eating Disorder Class: Print Patient Instructions Insomnia: Care Instructions Your Care Instructions Insomnia is the inability to sleep well. It is a common problem for most people at some time. Insomnia may make it hard for you to get to sleep, stay asleep, or sleep as long as you need to. This can make you tired and grouchy during the day. It can also make you forgetful, less effective at work, and unhappy. Insomnia can be caused by conditions such as depression or anxiety. Pain can also affect your ability to sleep.  When these problems are solved, the insomnia usually clears up. But sometimes bad sleep habits can cause insomnia. If insomnia is affecting your work or your enjoyment of life, you can take steps to improve your sleep. Follow-up care is a key part of your treatment and safety. Be sure to make and go to all appointments, and call your doctor if you are having problems. It's also a good idea to know your test results and keep a list of the medicines you take. How can you care for yourself at home? What to avoid · Do not have drinks with caffeine, such as coffee or black tea, for 8 hours before bed. · Do not smoke or use other types of tobacco near bedtime. Nicotine is a stimulant and can keep you awake. · Avoid drinking alcohol late in the evening, because it can cause you to wake in the middle of the night. · Do not eat a big meal close to bedtime. If you are hungry, eat a light snack. · Do not drink a lot of water close to bedtime, because the need to urinate may wake you up during the night. · Do not read or watch TV in bed. Use the bed only for sleeping and sexual activity. What to try · Go to bed at the same time every night, and wake up at the same time every morning. Do not take naps during the day. · Keep your bedroom quiet, dark, and cool. · Sleep on a comfortable pillow and mattress. · If watching the clock makes you anxious, turn it facing away from you so you cannot see the time. · If you worry when you lie down, start a worry book. Well before bedtime, write down your worries, and then set the book and your concerns aside. · Try meditation or other relaxation techniques before you go to bed. · If you cannot fall asleep, get up and go to another room until you feel sleepy. Do something relaxing. Repeat your bedtime routine before you go to bed again. · Make your house quiet and calm about an hour before bedtime.  Turn down the lights, turn off the TV, log off the computer, and turn down the volume on music. This can help you relax after a busy day. When should you call for help? Watch closely for changes in your health, and be sure to contact your doctor if: 
  · Your efforts to improve your sleep do not work.  
  · Your insomnia gets worse.  
  · You have been feeling down, depressed, or hopeless or have lost interest in things that you usually enjoy. Where can you learn more? Go to http://yulia-amilcar.info/. Enter P513 in the search box to learn more about \"Insomnia: Care Instructions. \" Current as of: October 10, 2017 Content Version: 11.7 © 7792-0267 [employer]. Care instructions adapted under license by [employer] (which disclaims liability or warranty for this information). If you have questions about a medical condition or this instruction, always ask your healthcare professional. [address] any warranty or liability for your use of this information. Bulimia: Care Instructions Your Care Instructions Bulimia nervosa is an eating disorder. People with bulimia are very concerned about body shape and size and are afraid of gaining weight. They may crave food and find ways to eat a lot of it fast. This binge eating is often set off by stress or an emotional upset. After overeating, people with bulimia may feel guilty, uncomfortable, or ashamed. They may vomit, use laxatives, or exercise excessively to get rid of the food they ate. Counseling to understand the condition and to learn ways to reduce stress is a big part of treatment for bulimia. Nutritional counseling can help you learn how to eat a healthy diet. It may help to have your family take part in family counseling so that they can support you. Treatment with medicines such as antidepressants also can help. Follow-up care is a key part of your treatment and safety.  Be sure to make and go to all appointments, and call your doctor if you are having problems. It's also a good idea to know your test results and keep a list of the medicines you take. How can you care for yourself at home? Follow your treatment plan · Take your medicines exactly as prescribed. Call your doctor if you think you are having a problem with your medicine. · Go to your counseling sessions. Call or talk with your counselor if you cannot attend or you think the sessions are not helping. Do not just stop going. Learn to be easier on yourself · Remember that feeling bad about yourself and not having hope is part of your condition. As you work with your doctor and counselors, you will start to feel better about yourself. · Make a list of things you have learned, things you have done that were hard for you, or things you have changed about yourself. · Do not blame yourself for having bulimia. Just work on getting better. · Learn to accept help. · Remember that your goal is to feel better each day. Take good care of yourself · Get enough sleep. Keep your room dark and quiet, and try to go to bed at the same time every night. · Try to lower your stress. Manage your time, build a strong system of social support, and lead a healthy lifestyle. To lower your stress, try physical activity, slow deep breathing, relaxing your muscles, or getting a massage. · Do not use alcohol or illegal drugs. · Learn the early signs of sudden, urgent food cravings. · Eat a healthy, balanced diet. A balanced diet includes whole grains, dairy, fruits and vegetables, and protein. Eat a variety of foods from each of these groups so that you get all the nutrients you need. When should you call for help? Call 911 anytime you think you may need emergency care. For example, call if: 
  · You feel hopeless or have thoughts of hurting yourself.  
  · You cough up blood.  
  · You vomit blood or what looks like coffee grounds.  
  · You pass reddish brown or very bloody stools.  Call your doctor now or seek immediate medical care if: 
  · You have pain in your belly.  
  · You have an irregular heartbeat.  
  · You feel dizzy or faint.  
 Watch closely for changes in your health, and be sure to contact your doctor if you have any problems. Where can you learn more? Go to http://yulia-amilcar.info/. Enter R191 in the search box to learn more about \"Bulimia: Care Instructions. \" Current as of: December 7, 2017 Content Version: 11.7 © 6183-3419 [employer]. Care instructions adapted under license by [employer] (which disclaims liability or warranty for this information). If you have questions about a medical condition or this instruction, always ask your healthcare professional. [address] any warranty or liability for your use of this information. Introducing [facility]! Dear Rayray: 
Thank you for requesting a [employer] account. Our records indicate that you already have an active [employer] account. You can access your account anytime at [email or web address]x Did you know that you can access your hospital and ER discharge instructions at any time in [employer]? You can also review all of your test results from your hospital stay or ER visit. Additional Information If you have questions, please visit the Frequently Asked Questions section of the [employer] website at [email or web address]/. Remember, [employer] is NOT to be used for urgent needs. For medical emergencies, dial 911. Now available from your iPhone and Android! Please provide this summary of care documentation to your next provider. Your primary care clinician is listed as Tamir Antoine. If you have any questions after today's visit, please call [phone].

## 2018-08-23 LAB
CCP IGA+IGG SERPL IA-ACNC: 5 UNITS (ref 0–19)
CRP SERPL-MCNC: 8.9 MG/L (ref 0–4.9)
ERYTHROCYTE [SEDIMENTATION RATE] IN BLOOD BY WESTERGREN METHOD: 17 MM/HR (ref 0–32)
RHEUMATOID FACT SERPL-ACNC: <10 IU/ML (ref 0–13.9)
TSH SERPL DL<=0.005 MIU/L-ACNC: 0.97 UIU/ML (ref 0.45–4.5)

## 2018-08-28 ENCOUNTER — APPOINTMENT (OUTPATIENT)
Dept: GENERAL RADIOLOGY | Age: 28
End: 2018-08-28
Attending: PHYSICIAN ASSISTANT
Payer: SUBSIDIZED

## 2018-08-28 ENCOUNTER — HOSPITAL ENCOUNTER (EMERGENCY)
Age: 28
Discharge: HOME | End: 2018-08-28
Attending: EMERGENCY MEDICINE
Payer: SUBSIDIZED

## 2018-08-28 VITALS
DIASTOLIC BLOOD PRESSURE: 87 MMHG | BODY MASS INDEX: 28.93 KG/M2 | WEIGHT: 153.22 LBS | SYSTOLIC BLOOD PRESSURE: 153 MMHG | OXYGEN SATURATION: 99 % | HEIGHT: 61 IN | HEART RATE: 90 BPM | TEMPERATURE: 98.4 F | RESPIRATION RATE: 20 BRPM

## 2018-08-28 DIAGNOSIS — R09.1 PLEURISY: Primary | ICD-10-CM

## 2018-08-28 DIAGNOSIS — R07.9 CHEST PAIN, UNSPECIFIED TYPE: ICD-10-CM

## 2018-08-28 DIAGNOSIS — R06.02 SOB (SHORTNESS OF BREATH): ICD-10-CM

## 2018-08-28 LAB
ALBUMIN SERPL-MCNC: 3.9 G/DL (ref 3.5–5)
ALBUMIN/GLOB SERPL: 0.9 {RATIO} (ref 1.1–2.2)
ALP SERPL-CCNC: 115 U/L (ref 45–117)
ALT SERPL-CCNC: 18 U/L (ref 12–78)
ANION GAP SERPL CALC-SCNC: 8 MMOL/L (ref 5–15)
APPEARANCE UR: CLEAR
AST SERPL-CCNC: 16 U/L (ref 15–37)
BASOPHILS # BLD: 0.1 K/UL (ref 0–0.1)
BASOPHILS NFR BLD: 0 % (ref 0–1)
BILIRUB SERPL-MCNC: <0.1 MG/DL (ref 0.2–1)
BILIRUB UR QL: NEGATIVE
BUN SERPL-MCNC: 17 MG/DL (ref 6–20)
BUN/CREAT SERPL: 23 (ref 12–20)
CALCIUM SERPL-MCNC: 9 MG/DL (ref 8.5–10.1)
CHLORIDE SERPL-SCNC: 105 MMOL/L (ref 97–108)
CO2 SERPL-SCNC: 24 MMOL/L (ref 21–32)
COLOR UR: NORMAL
CREAT SERPL-MCNC: 0.75 MG/DL (ref 0.55–1.02)
D DIMER PPP FEU-MCNC: 0.25 MG/L FEU (ref 0–0.65)
DIFFERENTIAL METHOD BLD: ABNORMAL
EOSINOPHIL # BLD: 0.2 K/UL (ref 0–0.4)
EOSINOPHIL NFR BLD: 2 % (ref 0–7)
ERYTHROCYTE [DISTWIDTH] IN BLOOD BY AUTOMATED COUNT: 11.8 % (ref 11.5–14.5)
GLOBULIN SER CALC-MCNC: 4.3 G/DL (ref 2–4)
GLUCOSE SERPL-MCNC: 97 MG/DL (ref 65–100)
GLUCOSE UR STRIP.AUTO-MCNC: NEGATIVE MG/DL
HCT VFR BLD AUTO: 37.6 % (ref 35–47)
HGB BLD-MCNC: 12.7 G/DL (ref 11.5–16)
HGB UR QL STRIP: NEGATIVE
IMM GRANULOCYTES # BLD: 0.1 K/UL (ref 0–0.04)
IMM GRANULOCYTES NFR BLD AUTO: 0 % (ref 0–0.5)
KETONES UR QL STRIP.AUTO: NEGATIVE MG/DL
LEUKOCYTE ESTERASE UR QL STRIP.AUTO: NEGATIVE
LYMPHOCYTES # BLD: 3.5 K/UL (ref 0.8–3.5)
LYMPHOCYTES NFR BLD: 25 % (ref 12–49)
MCH RBC QN AUTO: 30.7 PG (ref 26–34)
MCHC RBC AUTO-ENTMCNC: 33.8 G/DL (ref 30–36.5)
MCV RBC AUTO: 90.8 FL (ref 80–99)
MONOCYTES # BLD: 0.9 K/UL (ref 0–1)
MONOCYTES NFR BLD: 6 % (ref 5–13)
NEUTS SEG # BLD: 9.4 K/UL (ref 1.8–8)
NEUTS SEG NFR BLD: 67 % (ref 32–75)
NITRITE UR QL STRIP.AUTO: NEGATIVE
NRBC # BLD: 0 K/UL (ref 0–0.01)
NRBC BLD-RTO: 0 PER 100 WBC
PH UR STRIP: 6.5 [PH] (ref 5–8)
PLATELET # BLD AUTO: 433 K/UL (ref 150–400)
PMV BLD AUTO: 9.4 FL (ref 8.9–12.9)
POTASSIUM SERPL-SCNC: 3.5 MMOL/L (ref 3.5–5.1)
PROT SERPL-MCNC: 8.2 G/DL (ref 6.4–8.2)
PROT UR STRIP-MCNC: NEGATIVE MG/DL
RBC # BLD AUTO: 4.14 M/UL (ref 3.8–5.2)
SODIUM SERPL-SCNC: 137 MMOL/L (ref 136–145)
SP GR UR REFRACTOMETRY: 1.01 (ref 1–1.03)
TROPONIN I SERPL-MCNC: <0.05 NG/ML
UROBILINOGEN UR QL STRIP.AUTO: 0.2 EU/DL (ref 0.2–1)
WBC # BLD AUTO: 14.1 K/UL (ref 3.6–11)

## 2018-08-28 PROCEDURE — 85025 COMPLETE CBC W/AUTO DIFF WBC: CPT | Performed by: EMERGENCY MEDICINE

## 2018-08-28 PROCEDURE — 71046 X-RAY EXAM CHEST 2 VIEWS: CPT

## 2018-08-28 PROCEDURE — 84484 ASSAY OF TROPONIN QUANT: CPT | Performed by: EMERGENCY MEDICINE

## 2018-08-28 PROCEDURE — 77030029684 HC NEB SM VOL KT MONA -A

## 2018-08-28 PROCEDURE — 94640 AIRWAY INHALATION TREATMENT: CPT

## 2018-08-28 PROCEDURE — 99284 EMERGENCY DEPT VISIT MOD MDM: CPT

## 2018-08-28 PROCEDURE — 80053 COMPREHEN METABOLIC PANEL: CPT | Performed by: EMERGENCY MEDICINE

## 2018-08-28 PROCEDURE — 74011000250 HC RX REV CODE- 250: Performed by: PHYSICIAN ASSISTANT

## 2018-08-28 PROCEDURE — 74011636637 HC RX REV CODE- 636/637: Performed by: PHYSICIAN ASSISTANT

## 2018-08-28 PROCEDURE — 93005 ELECTROCARDIOGRAM TRACING: CPT

## 2018-08-28 PROCEDURE — 85379 FIBRIN DEGRADATION QUANT: CPT

## 2018-08-28 PROCEDURE — 36415 COLL VENOUS BLD VENIPUNCTURE: CPT | Performed by: EMERGENCY MEDICINE

## 2018-08-28 PROCEDURE — 81003 URINALYSIS AUTO W/O SCOPE: CPT | Performed by: EMERGENCY MEDICINE

## 2018-08-28 RX ORDER — ALBUTEROL SULFATE 0.83 MG/ML
2.5 SOLUTION RESPIRATORY (INHALATION)
Qty: 24 EACH | Refills: 0 | Status: SHIPPED | OUTPATIENT
Start: 2018-08-28

## 2018-08-28 RX ORDER — KETOROLAC TROMETHAMINE 10 MG/1
10 TABLET, FILM COATED ORAL
Qty: 20 TAB | Refills: 0 | Status: SHIPPED | OUTPATIENT
Start: 2018-08-28 | End: 2019-06-29

## 2018-08-28 RX ORDER — PREDNISONE 20 MG/1
60 TABLET ORAL
Status: COMPLETED | OUTPATIENT
Start: 2018-08-28 | End: 2018-08-28

## 2018-08-28 RX ORDER — IPRATROPIUM BROMIDE AND ALBUTEROL SULFATE 2.5; .5 MG/3ML; MG/3ML
3 SOLUTION RESPIRATORY (INHALATION)
Status: COMPLETED | OUTPATIENT
Start: 2018-08-28 | End: 2018-08-28

## 2018-08-28 RX ORDER — PREDNISONE 10 MG/1
TABLET ORAL
Qty: 21 TAB | Refills: 0 | Status: SHIPPED | OUTPATIENT
Start: 2018-08-28 | End: 2018-09-06 | Stop reason: ALTCHOICE

## 2018-08-28 RX ORDER — ALBUTEROL SULFATE 90 UG/1
2 AEROSOL, METERED RESPIRATORY (INHALATION)
Qty: 1 INHALER | Refills: 0 | Status: SHIPPED | OUTPATIENT
Start: 2018-08-28

## 2018-08-28 RX ORDER — ALBUTEROL SULFATE 90 UG/1
AEROSOL, METERED RESPIRATORY (INHALATION)
COMMUNITY
End: 2019-07-26

## 2018-08-28 RX ADMIN — PREDNISONE 60 MG: 20 TABLET ORAL at 20:34

## 2018-08-28 RX ADMIN — IPRATROPIUM BROMIDE AND ALBUTEROL SULFATE 3 ML: .5; 3 SOLUTION RESPIRATORY (INHALATION) at 19:42

## 2018-08-28 NOTE — ED PROVIDER NOTES
EMERGENCY DEPARTMENT HISTORY AND PHYSICAL EXAM 
 
 
Date: 8/28/2018 Patient Name: Griselda Javed History of Presenting Illness Chief Complaint Patient presents with  Shortness of Breath  
  intermittent SOB x1 week, sometimes worse with exertion  Chest Pain  
  tightness over left side of chest  
 Cough x3 week with Alice Leno specks\" History Provided By: Patient HPI: Griselda Javed, 34 y.o. female with PMHx significant for bipolar disorder, GERD, migraines, asthma, presents ambulatory to the ED with cc of shortness of breath. Patient states that for the past 10 days she has been experiencing SOB, worsened with exertion. She reports intermittent chest pain, but states that the SOB is present without the pain. She endorses a history of asthma, and has not been taking her Symbicort due to cost of the medicine. She has had an intermittent productive cough and has noticed some brown specks in the sputum. She denies fevers, chills, NVD, abdominal pain, dysuria, or hematuria. She denies recent travel, leg swelling, oral contraceptive use, or history of blood clots. She endorses a family history of blood clots. Chief Complaint: SOB Duration: 10 Days Timing:  Acute Location: chest 
Quality: Aching Severity: 10 out of 10 Modifying Factors: none Associated Symptoms: denies any other associated signs or symptoms There are no other complaints, changes, or physical findings at this time. PCP: Sean Sommer NP Current Outpatient Prescriptions Medication Sig Dispense Refill  albuterol (PROVENTIL HFA, VENTOLIN HFA, PROAIR HFA) 90 mcg/actuation inhaler Take  by inhalation.  predniSONE (STERAPRED DS) 10 mg dose pack Take as directed 21 Tab 0  
 ketorolac (TORADOL) 10 mg tablet Take 1 Tab by mouth every six (6) hours as needed for Pain.  20 Tab 0  
 albuterol (PROVENTIL HFA, VENTOLIN HFA, PROAIR HFA) 90 mcg/actuation inhaler Take 2 Puffs by inhalation every four (4) hours as needed for Wheezing. 1 Inhaler 0  
 albuterol (PROVENTIL VENTOLIN) 2.5 mg /3 mL (0.083 %) nebulizer solution 3 mL by Nebulization route every four (4) hours as needed for Wheezing. 24 Each 0  
 LORazepam (ATIVAN) 0.5 mg tablet Take 1 Tab by mouth two (2) times a day. Max Daily Amount: 1 mg. Indications: Insomnia, panic attacks 60 Tab 1  
 traZODone (DESYREL) 50 mg tablet Take 1/2-1 tablet as tolerated with a snack at bedtime for insomnia  Indications: insomnia associated with depression 30 Tab 1 Past History Past Medical History: 
Past Medical History:  
Diagnosis Date  Asthma  [facility]  Community acquired pneumonia  GERD (gastroesophageal reflux disease)  Headache(784.0) Migraine  Heart murmur 01/1993  Lung nodule  Neurological disorder   
 migraines  Second hand smoke exposure Past Surgical History: 
Past Surgical History:  
Procedure Laterality Date  HX CHOLECYSTECTOMY  HX HEENT    
 wisdom teeth extraction  HX HEENT    
 BMWT Family History: 
Family History Problem Relation Age of Onset  Diabetes Father  Heart Disease Father  Hypertension Father  Anxiety Father  COPD Father  Diabetes Mother  Hypertension Mother  Anxiety Mother  COPD Mother  Diabetes Paternal Grandmother Social History: 
Social History Substance Use Topics  Smoking status: Former Smoker Packs/day: 1.00 Years: 5.00 Quit date: 7/20/2017  Smokeless tobacco: Never Used  Alcohol use No  
 
 
Allergies: 
No Known Allergies Review of Systems Review of Systems Constitutional: Negative for chills and fever. HENT: Negative for congestion and sore throat. Eyes: Negative for pain. Respiratory: Positive for cough, chest tightness and shortness of breath. Cardiovascular: Positive for chest pain (intermittent). Gastrointestinal: Negative for abdominal pain, diarrhea, nausea and vomiting. Genitourinary: Negative for dysuria and hematuria. Musculoskeletal: Negative for arthralgias and myalgias. Skin: Negative for rash. Neurological: Negative for dizziness, light-headedness, numbness and headaches. Psychiatric/Behavioral: Negative for behavioral problems and confusion. Physical Exam  
Physical Exam  
Constitutional: She is oriented to person, place, and time. She appears well-developed and well-nourished. No distress. HENT:  
Head: Normocephalic and atraumatic. Right Ear: Hearing, tympanic membrane, external ear and ear canal normal. No swelling or tenderness. No hemotympanum. Left Ear: Hearing, tympanic membrane, external ear and ear canal normal. No swelling or tenderness. No middle ear effusion. No hemotympanum. Nose: Nose normal. No mucosal edema or rhinorrhea. Mouth/Throat: Uvula is midline and oropharynx is clear and moist. No oral lesions. No trismus in the jaw. No uvula swelling. No oropharyngeal exudate, posterior oropharyngeal edema, posterior oropharyngeal erythema or tonsillar abscesses. Eyes: Conjunctivae and EOM are normal.  
Neck: Normal range of motion and full passive range of motion without pain. Neck supple. No spinous process tenderness and no muscular tenderness present. No rigidity. No edema, no erythema and normal range of motion present. No Brudzinski's sign noted. Cardiovascular: Normal rate, regular rhythm and normal heart sounds. No murmur heard. Pulmonary/Chest: Effort normal. No accessory muscle usage. No respiratory distress. She has no decreased breath sounds. She has wheezes in the right lower field and the left lower field. She has no rhonchi. She has no rales. She exhibits no tenderness, no bony tenderness, no laceration and no crepitus. Abdominal: Soft. Normal appearance and bowel sounds are normal. She exhibits no distension. There is no tenderness. There is no guarding and no CVA tenderness. Musculoskeletal: Normal range of motion. She exhibits no edema or tenderness. Neurological: She is alert and oriented to person, place, and time. Skin: Skin is warm and dry. No rash noted. She is not diaphoretic. Psychiatric: She has a normal mood and affect. Her behavior is normal. Judgment normal.  
Nursing note and vitals reviewed. Diagnostic Study Results Labs - Recent Results (from the past 12 hour(s)) EKG, 12 LEAD, INITIAL Collection Time: 08/28/18  6:29 PM  
Result Value Ref Range Ventricular Rate 80 BPM  
 Atrial Rate 80 BPM  
 P-R Interval 130 ms QRS Duration 78 ms Q-T Interval 368 ms QTC Calculation (Bezet) 424 ms Calculated P Axis 3 degrees Calculated R Axis 81 degrees Calculated T Axis 56 degrees Diagnosis Normal sinus rhythm Normal ECG When compared with ECG of 20-AUG-2018 17:17, No significant change was found URINALYSIS W/ RFLX MICROSCOPIC Collection Time: 08/28/18  6:45 PM  
Result Value Ref Range Color YELLOW/STRAW Appearance CLEAR CLEAR Specific gravity 1.006 1.003 - 1.030    
 pH (UA) 6.5 5.0 - 8.0 Protein NEGATIVE  NEG mg/dL Glucose NEGATIVE  NEG mg/dL Ketone NEGATIVE  NEG mg/dL Bilirubin NEGATIVE  NEG Blood NEGATIVE  NEG Urobilinogen 0.2 0.2 - 1.0 EU/dL Nitrites NEGATIVE  NEG Leukocyte Esterase NEGATIVE  NEG    
CBC WITH AUTOMATED DIFF Collection Time: 08/28/18  7:00 PM  
Result Value Ref Range WBC 14.1 (H) 3.6 - 11.0 K/uL  
 RBC 4.14 3.80 - 5.20 M/uL  
 HGB 12.7 11.5 - 16.0 g/dL HCT 37.6 35.0 - 47.0 % MCV 90.8 80.0 - 99.0 FL  
 MCH 30.7 26.0 - 34.0 PG  
 MCHC 33.8 30.0 - 36.5 g/dL  
 RDW 11.8 11.5 - 14.5 % PLATELET 269 (H) 037 - 400 K/uL MPV 9.4 8.9 - 12.9 FL  
 NRBC 0.0 0  WBC ABSOLUTE NRBC 0.00 0.00 - 0.01 K/uL NEUTROPHILS 67 32 - 75 % LYMPHOCYTES 25 12 - 49 % MONOCYTES 6 5 - 13 % EOSINOPHILS 2 0 - 7 % BASOPHILS 0 0 - 1 % IMMATURE GRANULOCYTES 0 0.0 - 0.5 % ABS. NEUTROPHILS 9.4 (H) 1.8 - 8.0 K/UL  
 ABS. LYMPHOCYTES 3.5 0.8 - 3.5 K/UL  
 ABS. MONOCYTES 0.9 0.0 - 1.0 K/UL  
 ABS. EOSINOPHILS 0.2 0.0 - 0.4 K/UL  
 ABS. BASOPHILS 0.1 0.0 - 0.1 K/UL  
 ABS. IMM. GRANS. 0.1 (H) 0.00 - 0.04 K/UL  
 DF AUTOMATED METABOLIC PANEL, COMPREHENSIVE Collection Time: 08/28/18  7:00 PM  
Result Value Ref Range Sodium 137 136 - 145 mmol/L Potassium 3.5 3.5 - 5.1 mmol/L Chloride 105 97 - 108 mmol/L  
 CO2 24 21 - 32 mmol/L Anion gap 8 5 - 15 mmol/L Glucose 97 65 - 100 mg/dL BUN 17 6 - 20 MG/DL Creatinine 0.75 0.55 - 1.02 MG/DL  
 BUN/Creatinine ratio 23 (H) 12 - 20 GFR est AA >60 >60 ml/min/1.73m2 GFR est non-AA >60 >60 ml/min/1.73m2 Calcium 9.0 8.5 - 10.1 MG/DL Bilirubin, total <0.1 (L) 0.2 - 1.0 MG/DL  
 ALT (SGPT) 18 12 - 78 U/L  
 AST (SGOT) 16 15 - 37 U/L Alk. phosphatase 115 45 - 117 U/L Protein, total 8.2 6.4 - 8.2 g/dL Albumin 3.9 3.5 - 5.0 g/dL Globulin 4.3 (H) 2.0 - 4.0 g/dL A-G Ratio 0.9 (L) 1.1 - 2.2    
TROPONIN I Collection Time: 08/28/18  7:00 PM  
Result Value Ref Range Troponin-I, Qt. <0.05 <0.05 ng/mL D DIMER Collection Time: 08/28/18  7:28 PM  
Result Value Ref Range D-dimer 0.25 0.00 - 0.65 mg/L FEU Radiologic Studies - CXR Results  (Last 48 hours) 08/28/18 1933  XR CHEST PA LAT Final result Impression:  Impression: 1. No acute cardiopulmonary disease Narrative:  INDICATION:  cough, chest pain Exam: Chest 2 views. Comparison: August 20, 2018. Findings: Cardiomediastinal silhouette is normal. Pulmonary vasculature is not  
engorged. No focal parenchymal opacities, effusions, or pneumothorax. Bony  
thorax is intact. Medical Decision Making I am the first provider for this patient. I reviewed the vital signs, available nursing notes, past medical history, past surgical history, family history and social history. Vital Signs-Reviewed the patient's vital signs. Patient Vitals for the past 12 hrs: 
 Temp Pulse Resp BP SpO2  
08/28/18 1826 98.4 °F (36.9 °C) 90 20 153/87 99 % Pulse Oximetry Analysis - 99% on RA Cardiac Monitor:  
Rate: 90 bpm 
Rhythm: Normal Sinus Rhythm EKG interpretation: (Preliminary) 18:32 PM 
Rhythm: normal sinus rhythm; and regular . Rate (approx.): 80; Axis: normal; AR interval: normal; QRS interval: normal ; ST/T wave: normal; Other findings: normal. 
 
Records Reviewed: Nursing Notes and Old Medical Records Provider Notes (Medical Decision Making): DDx: bronchitis, pleurisy, PNA, PE, PTX, pericarditis, myocarditis, GERD, costochondritis, anxiety. Patient presents with SOB and intermittent chest pain. Will obtain labs, CXR, EKG. Labs, EKG and CXR WNL. D-dimer negative. Will dc and encourage close f/u with PCP or Pulmonology ED Course:  
Initial assessment performed. The patients presenting problems have been discussed, and they are in agreement with the care plan formulated and outlined with them. I have encouraged them to ask questions as they arise throughout their visit. 8:30 PM 
I have just reevaluated the patient. I have reviewed her vital signs and determined there is currently no worsening in their condition or physical exam. Results have been reviewed with them and their questions have been answered. Disposition: 
DISCHARGE NOTE: 
8:45 PM 
The care plan has been outline with the patient and/or family, who verbally conveyed understanding and agreement. Available results have been reviewed. Patient and/or family understand the follow up plan as outlined and discharge instructions.  Should their condition deterioration at any time after discharge the patient agrees to return, follow up sooner than outlined or seek medical assistance at the closest Emergency Room as soon as possible. Questions have been answered. Discharge instructions and educational information regarding the patient's diagnosis as well a list of reasons why the patient would want to seek immediate medical attention, should their condition change, were reviewed directly with the patient/family PLAN: 
1. Discharge home 2. Medications as directed 3. Schedule f/u with PCP 4. Return precautions reviewed Discharge Medication List as of 8/28/2018  8:20 PM  
  
START taking these medications Details  
predniSONE (STERAPRED DS) 10 mg dose pack Take as directed, Normal, Disp-21 Tab, R-0  
  
ketorolac (TORADOL) 10 mg tablet Take 1 Tab by mouth every six (6) hours as needed for Pain., Normal, Disp-20 Tab, R-0  
  
!! albuterol (PROVENTIL HFA, VENTOLIN HFA, PROAIR HFA) 90 mcg/actuation inhaler Take 2 Puffs by inhalation every four (4) hours as needed for Wheezing., Normal, Disp-1 Inhaler, R-0  
  
 !! - Potential duplicate medications found. Please discuss with provider. CONTINUE these medications which have NOT CHANGED Details  
!! albuterol (PROVENTIL HFA, VENTOLIN HFA, PROAIR HFA) 90 mcg/actuation inhaler Take  by inhalation. , Historical Med LORazepam (ATIVAN) 0.5 mg tablet Take 1 Tab by mouth two (2) times a day. Max Daily Amount: 1 mg. Indications: Insomnia, panic attacks, Print, Disp-60 Tab, R-1  
  
traZODone (DESYREL) 50 mg tablet Take 1/2-1 tablet as tolerated with a snack at bedtime for insomnia  Indications: insomnia associated with depression, Normal, Disp-30 Tab, R-1  
  
 !! - Potential duplicate medications found. Please discuss with provider. 5.  
Follow-up Information Follow up With Details Comments Contact Info Cathleen Castañeda NP Go in 3 days  [address] [facility] [address] 
[phone] Nazario Cain MD Go to  [address] [facility] [address] [address] [place] [address] 
[phone] [facility] EMERGENCY DEPT  If symptoms worsen, As needed [address] [facility] 
[phone] Return to ED if worse Diagnosis Clinical Impression: 1. Pleurisy 2. SOB (shortness of breath) 3. Chest pain, unspecified type This note will not be viewable in [address]

## 2018-08-28 NOTE — ED NOTES
Assumed care of pt. From shon. Pt. Presents to the ED with chief complaint of SOB x 1 week that is worse with exertion, chest pressure x 1 week and cough x 3 weeks that produces some \"brown specks. \" Pt denies any recent long travel or flying. Pt. Is A/O x 4. Pt. Denies any other symptoms at this time. Pt. Resting comfortably on the stretcher in a position of comfort. Pt. In no acute distress at this time. Call bell within reach. Side rails x 1. Stretcher locked in the lowest position. Pt. Aware of plan to await for MD/PA-C/NP assessment, and patient/family verbalizes understanding. Will continue to monitor.

## 2018-08-29 LAB
ATRIAL RATE: 80 BPM
CALCULATED P AXIS, ECG09: 3 DEGREES
CALCULATED R AXIS, ECG10: 81 DEGREES
CALCULATED T AXIS, ECG11: 56 DEGREES
DIAGNOSIS, 93000: NORMAL
P-R INTERVAL, ECG05: 130 MS
Q-T INTERVAL, ECG07: 368 MS
QRS DURATION, ECG06: 78 MS
QTC CALCULATION (BEZET), ECG08: 424 MS
VENTRICULAR RATE, ECG03: 80 BPM

## 2018-08-29 NOTE — ED NOTES
FAWAD MCKOY gave and reviewed discharge instructions with the patient. The patient verbalized understanding. The patient was given opportunity for questions. Patient discharged in stable condition to the waiting room via ambulatory with female visitor.

## 2018-08-29 NOTE — DISCHARGE INSTRUCTIONS
Thank you!     Thank you for allowing us to provide you with excellent care today. We hope we addressed all of your concerns and needs. We strive to provide excellent quality care in the Emergency Department. You will receive a survey after your visit to evaluate the care you were provided.      Please rate us a level 5 (excellent), as anything less than excellent does not meet our goals.      If you feel that you have not received excellent quality care or timely care, please ask to speak to the nurse manager. Please choose us in the future for your continued health care needs. ______________________________________________________________________    The exam and treatment you received in the Emergency Department were for an urgent problem and are not intended as complete care. It is important that you follow-up with a doctor, nurse practitioner, or physician assistant to:  (1) confirm your diagnosis,  (2) re-evaluation of changes in your illness and treatment, and  (3) for ongoing care. If your symptoms become worse or you do not improve as expected and you are unable to reach your usual health care provider, you should return to the Emergency Department. We are available 24 hours a day. Take this sheet with you when you go to your follow-up visit. If you have any problem arranging the follow-up visit, contact [address] [place] [address] 21 [phone])    Make an appointment with your Primary Care doctor for follow up of this visit. Return to the ER if you are unable to be seen in the time recommended on your discharge instructions. Pleurisy: Care Instructions  Your Care Instructions  Pleurisy is inflammation of the tissue that lines the inside of the chest and covers the lungs (pleura). Pleurisy is often caused by an infection, usually a virus. It also can be caused by other health problems, such as pneumonia or lupus. Pleurisy can cause sharp chest pain that gets worse when you cough or take a deep breath.   You may need more tests to find out what is causing your pleurisy. Treatment depends on the cause. Pleurisy may come and go for a few days, or it may continue if the cause has not been treated. Home treatment can help ease symptoms. Follow-up care is a key part of your treatment and safety. Be sure to make and go to all appointments, and call your doctor if you are having problems. It's also a good idea to know your test results and keep a list of the medicines you take. How can you care for yourself at home? · Take an over-the-counter pain medicine, such as acetaminophen (Tylenol), ibuprofen (Advil, Motrin), or naproxen (Aleve). Read and follow all instructions on the label. · Do not take two or more pain medicines at the same time unless the doctor told you to. Many pain medicines have acetaminophen, which is Tylenol. Too much acetaminophen (Tylenol) can be harmful. · If your doctor prescribed antibiotics, take them as directed. Do not stop taking them just because you feel better. You need to take the full course of antibiotics. · Take cough medicine as directed if your doctor recommends it. · Avoid activities that make the pain worse. When should you call for help? Call 911 anytime you think you may need emergency care. For example, call if:    · You have severe trouble breathing.     · You have severe chest pain.     · You passed out (lost consciousness).    Call your doctor now or seek immediate medical care if:    · You have a new or higher fever.    Watch closely for changes in your health, and be sure to contact your doctor if:    · You begin to cough up yellow or green mucus.     · You cough up blood.     · Your symptoms are not better in 3 or 4 days. Where can you learn more? Go to http://yulia-amilcar.info/. Enter F346 in the search box to learn more about \"Pleurisy: Care Instructions. \"  Current as of: December 6, 2017  Content Version: 11.7  © 1420-1002 [employer].  Care instructions adapted under license by [employer] (which disclaims liability or warranty for this information). If you have questions about a medical condition or this instruction, always ask your healthcare professional. [address] any warranty or liability for your use of this information.

## 2018-09-04 ENCOUNTER — OFFICE VISIT (OUTPATIENT)
Dept: CARDIOLOGY CLINIC | Age: 28
End: 2018-09-04

## 2018-09-04 VITALS
HEIGHT: 61 IN | OXYGEN SATURATION: 98 % | SYSTOLIC BLOOD PRESSURE: 140 MMHG | RESPIRATION RATE: 18 BRPM | DIASTOLIC BLOOD PRESSURE: 80 MMHG | BODY MASS INDEX: 29.45 KG/M2 | WEIGHT: 156 LBS | HEART RATE: 78 BPM

## 2018-09-04 DIAGNOSIS — R06.02 SOB (SHORTNESS OF BREATH): ICD-10-CM

## 2018-09-04 DIAGNOSIS — R01.1 MURMUR: ICD-10-CM

## 2018-09-04 DIAGNOSIS — R07.9 CHEST PAIN, UNSPECIFIED TYPE: Primary | ICD-10-CM

## 2018-09-04 DIAGNOSIS — R00.2 PALPITATIONS: ICD-10-CM

## 2018-09-04 NOTE — PROGRESS NOTES
Dr. Traci Whelan would like patient to have a 30 day event monitor dx palps, tachycardia, syncope. Message was sent to appropriate staff to get this ordered.

## 2018-09-04 NOTE — PROGRESS NOTES
HISTORY OF PRESENT ILLNESS  Noah Romo is a 34 y.o. female     SUMMARY:   Problem List  Date Reviewed: 9/4/2018          Codes Class Noted    Symptomatic cholelithiasis ICD-10-CM: K80.20  ICD-9-CM: 574.20  5/24/2018        Asthma ICD-10-CM: J45.909  ICD-9-CM: 493.90  2/3/2014    Overview Signed 2/3/2014  3:16 PM by Rob Hopkins MD     Has used QVar and albuterol. Family has nebulizer at home. Migraine headache without aura ICD-10-CM: G43.009  ICD-9-CM: 346.10  2/3/2014    Overview Signed 2/3/2014  3:17 PM by Rob Hopkins MD     On Topamax in past. Does OK with Excedrin Migraine. Smoker ICD-10-CM: F17.200  ICD-9-CM: 305.1  2/3/2014    Overview Signed 2/3/2014  3:18 PM by Rob Hopkins MD     Motivated to quit                   Current Outpatient Prescriptions on File Prior to Visit   Medication Sig    LORazepam (ATIVAN) 0.5 mg tablet Take 1 Tab by mouth two (2) times a day. Max Daily Amount: 1 mg. Indications: Insomnia, panic attacks    traZODone (DESYREL) 50 mg tablet Take 1/2-1 tablet as tolerated with a snack at bedtime for insomnia  Indications: insomnia associated with depression    albuterol (PROVENTIL HFA, VENTOLIN HFA, PROAIR HFA) 90 mcg/actuation inhaler Take  by inhalation.  predniSONE (STERAPRED DS) 10 mg dose pack Take as directed    ketorolac (TORADOL) 10 mg tablet Take 1 Tab by mouth every six (6) hours as needed for Pain.  albuterol (PROVENTIL HFA, VENTOLIN HFA, PROAIR HFA) 90 mcg/actuation inhaler Take 2 Puffs by inhalation every four (4) hours as needed for Wheezing.  albuterol (PROVENTIL VENTOLIN) 2.5 mg /3 mL (0.083 %) nebulizer solution 3 mL by Nebulization route every four (4) hours as needed for Wheezing. No current facility-administered medications on file prior to visit.         CARDIOLOGY STUDIES TO DATE:  None      Chief Complaint   Patient presents with    New Patient    Hypertension    Heart Murmur     HPI :  Ms. Dyana Carmen is a 34year-old whose father is a patient of ours, referred from the [facility] ER for cardiac evaluation. She has had trouble off and on for more than several years with anxiety associated with palpitations and dizziness. She describes episodes of classic sounding vasovagal syncope, which is according to her maybe once or twice a year for the last several years as well. She has chronic asthma, which is incompletely treated and has noticed worsening shortness of breath recently. She has occasional chest pain and ended up in the ER with these complaints recently. Her EKG was normal.  Her labs were normal, including a d-dimer and her chest x-ray was negative. I have reviewed all of those records and summarized them in the appropriate places in her chart. There is no history of diabetes. Cholesterol status is unknown. She quit smoking about a year ago. Family history is positive for premature coronary disease in her father. When she was a baby, she had a heart murmur and saw Dr. Juaquin Dill and had a normal echocardiogram at that time. She has intermittently been told she had a murmur since then. Her final concern is related to her systolic blood pressure, which has been somewhat elevated over the last few weeks. She does a lot of heavy lifting as part of her work, but gets no regular exercise. She sleeps poorly. She has lots of trouble with heartburn, indigestion.         CARDIAC ROS:   negative for orthopnea, paroxysmal nocturnal dyspnea, exertional chest pressure/discomfort, claudication, lower extremity edema    Family History   Problem Relation Age of Onset    Diabetes Father     Heart Disease Father     Hypertension Father     Anxiety Father     COPD Father     Diabetes Mother     Hypertension Mother     Anxiety Mother     COPD Mother     Diabetes Paternal Grandmother        Past Medical History:   Diagnosis Date    Asthma     Bipolar disorder (manic depression) ([facility] [address] Richy Rosa acquired pneumonia     GERD (gastroesophageal reflux disease)     Headache(784.0)     Migraine    Heart murmur 01/1993    Lung nodule     Neurological disorder     migraines    Second hand smoke exposure        GENERAL ROS:  please see reviewed scanned documents    Visit Vitals    /80 (BP 1 Location: Left arm, BP Patient Position: Sitting)    Pulse 78    Resp 18    Ht 5' 1\" (1.549 m)    Wt 156 lb (70.8 kg)    SpO2 98%    BMI 29.48 kg/m2       Wt Readings from Last 3 Encounters:   09/04/18 156 lb (70.8 kg)   08/28/18 153 lb 3.5 oz (69.5 kg)   08/22/18 154 lb (69.9 kg)            BP Readings from Last 3 Encounters:   09/04/18 140/80   08/28/18 153/87   08/22/18 143/84       PHYSICAL EXAM  General appearance: alert, cooperative, no distress, appears stated age  Neurologic: Alert and oriented X 3  Neck: supple, symmetrical, trachea midline, no adenopathy, no carotid bruit and no JVD  Lungs: clear to auscultation bilaterally  Heart: regular rate and rhythm, S1, S2 normal, no S3 or S4, systolic murmur: early systolic 2/6, crescendo at 2nd left intercostal space  Abdomen: soft, non-tender. Bowel sounds normal. No masses,  no organomegaly  Extremities: extremities normal, atraumatic, no cyanosis or edema  Pulses: 2+ and symmetric      ASSESSMENT  I do not think Ms. Donell Seip is having any serious problems here. I think a lot of her symptoms are probably related to her anxiety, depression, sleep disturbance and poorly treated asthma. This may also be contributing to her blood pressure, which is not in any dangerous sort of level and in addition to aerobic exercise, weight control and salt restriction, I think we should just continue to monitor her pressures for now. In the meantime, we are going to provide her with an event monitor and do an echocardiogram to reevaluate this murmur.            current treatment plan is effective, no change in therapy  lab results and schedule of future lab studies reviewed with patient  reviewed diet, exercise and weight control    Encounter Diagnoses   Name Primary?  Chest pain, unspecified type Yes    Murmur     SOB (shortness of breath)     Palpitations      Orders Placed This Encounter    2D ECHO COMPLETE ADULT (TTE) W OR WO CONTR       Follow-up Disposition:  Return in about 4 weeks (around 10/2/2018).     Heidy Villafuerte MD  9/4/2018

## 2018-09-04 NOTE — MR AVS SNAPSHOT
[address] [facility] [address] [place] 
[phone] Patient: Nikki Shankar MRN: [id number] KGM:[birth year] Visit Information Date & Time Provider Department Dept. Phone Encounter #  
 9/4/2018  1:40 PM Dhruv Blackmon MD [facility] Donita Meraz [phone] [id number] Follow-up Instructions Return in about 4 weeks (around 10/2/2018). Your Appointments 9/4/2018  1:40 PM  
New Patient with Dhruv Blackmon MD  
[facility] ([facility]) Appt Note: self ref Dx. Mumur/Records in cc; 11/16/17- lm to cb and r/s appt- ap; ER f/u for heart murmur  
 [address] [facility] [address] [place] [address]  
[address] [address]  
  
    
 9/6/2018 10:45 AM  
ESTABLISHED PATIENT with Kali Luna MD  
[address] ([facility]) Appt Note: 2 week fu  
 [address]  
[phone] [address] [facility] [address]  
  
    
 9/18/2018  9:00 AM  
New Patient with Anthony Rosas MD  
[facility] at [facility] Appt Note: Np referral from [address] [place] [address] for elevated WBC  
 [address] [facility] [address]  
[phone]  
  
   
 [address] [address]  
  
    
 9/21/2018 11:30 AM  
PAP with Claude Challenger, NP [facility] ([facility]) Appt Note: 4 week f/up for Pap [address] [facility] [address]  
[address] [facility] [address]  
  
    
 9/27/2018  9:00 AM  
New Patient with Laurita Roth DO [facility] at [address] [facility]) Appt Note: NP headaches/dizziness $0 09/04/[address]  
[phone] [address] [facility] [address] Upcoming Health Maintenance Date Due  
 HPV Age 9Y-34Y (1 of 3 - Female 3 Dose Series) 3/3/2003 Pneumococcal 19-64 Highest Risk (1 of 3 - PCV13) 3/3/2011 DTaP/Tdap/Td series (1 - Tdap) 3/3/2013 PAP AKA CERVICAL CYTOLOGY 3/3/2013 Influenza Age 5 to Adult 8/1/2018 Allergies as of 9/4/2018  Review Complete On: 9/4/2018 By: Eris Kurtz MD  
 No Known Allergies Current Immunizations  Never Reviewed No immunizations on file. Not reviewed this visit You Were Diagnosed With   
  
 Codes Comments Chest pain, unspecified type    -  Primary ICD-10-CM: R07.9 ICD-9-CM: 786.50 Smoker     ICD-10-CM: K28.833 ICD-9-CM: 305.1 Murmur     ICD-10-CM: R01.1 ICD-9-CM: 785.2 SOB (shortness of breath)     ICD-10-CM: R06.02 
ICD-9-CM: 786.05 Vitals BP Pulse Resp Height(growth percentile) Weight(growth percentile) SpO2  
 140/80 (BP 1 Location: Left arm, BP Patient Position: Sitting) 78 18 5' 1\" (1.549 m) 156 lb (70.8 kg) 98% BMI OB Status Smoking Status 29.48 kg/m2 Having regular periods Former Smoker Vitals History BMI and BSA Data Body Mass Index Body Surface Area  
 29.48 kg/m 2 1.75 m 2 Preferred Pharmacy Pharmacy Name Phone [id number] [phone] Your Updated Medication List  
  
   
This list is accurate as of 9/4/18  1:33 PM.  Always use your most recent med list.  
  
  
  
  
 * albuterol 90 mcg/actuation inhaler Commonly known as:  PROVENTIL HFA, VENTOLIN HFA, PROAIR HFA Take  by inhalation. * albuterol 90 mcg/actuation inhaler Commonly known as:  PROVENTIL HFA, VENTOLIN HFA, PROAIR HFA Take 2 Puffs by inhalation every four (4) hours as needed for Wheezing. * albuterol 2.5 mg /3 mL (0.083 %) nebulizer solution Commonly known as:  PROVENTIL VENTOLIN  
 3 mL by Nebulization route every four (4) hours as needed for Wheezing.  
  
 ketorolac 10 mg tablet Commonly known as:  TORADOL Take 1 Tab by mouth every six (6) hours as needed for Pain. LORazepam 0.5 mg tablet Commonly known as:  ATIVAN Take 1 Tab by mouth two (2) times a day. Max Daily Amount: 1 mg. Indications: Insomnia, panic attacks  
  
 predniSONE 10 mg dose pack Commonly known as:  STERAPRED DS Take as directed  
  
 traZODone 50 mg tablet Commonly known as:  Willie Oh Take 1/2-1 tablet as tolerated with a snack at bedtime for insomnia  Indications: insomnia associated with depression * Notice: This list has 3 medication(s) that are the same as other medications prescribed for you. Read the directions carefully, and ask your doctor or other care provider to review them with you. Follow-up Instructions Return in about 4 weeks (around 10/2/2018). Introducing hospitals & HEALTH SERVICES! Dear Woodard Litten: 
Thank you for requesting a [employer] Now! account. Our records indicate that you already have an active Blue[employer] Now! account. You can access your account anytime at [email or web address] Did you know that you can access your hospital and ER discharge instructions at any time in Blue[employer] Now!? You can also review all of your test results from your hospital stay or ER visit. Additional Information If you have questions, please visit the Frequently Asked Questions section of the Blue[employer] Now! website at [email or web address] Remember, Bluebox Now! is NOT to be used for urgent needs. For medical emergencies, dial 911. Now available from your iPhone and Android! Please provide this summary of care documentation to your next provider. Your primary care clinician is listed as Sabrina Mendez. If you have any questions after today's visit, please call [phone].

## 2018-09-05 NOTE — PATIENT INSTRUCTIONS
Body Mass Index: Care Instructions  Your Care Instructions    Body mass index (BMI) can help you see if your weight is raising your risk for health problems. It uses a formula to compare how much you weigh with how tall you are. · A BMI lower than 18.5 is considered underweight. · A BMI between 18.5 and 24.9 is considered healthy. · A BMI between 25 and 29.9 is considered overweight. A BMI of 30 or higher is considered obese. If your BMI is in the normal range, it means that you have a lower risk for weight-related health problems. If your BMI is in the overweight or obese range, you may be at increased risk for weight-related health problems, such as high blood pressure, heart disease, stroke, arthritis or joint pain, and diabetes. If your BMI is in the underweight range, you may be at increased risk for health problems such as fatigue, lower protection (immunity) against illness, muscle loss, bone loss, hair loss, and hormone problems. BMI is just one measure of your risk for weight-related health problems. You may be at higher risk for health problems if you are not active, you eat an unhealthy diet, or you drink too much alcohol or use tobacco products. Follow-up care is a key part of your treatment and safety. Be sure to make and go to all appointments, and call your doctor if you are having problems. It's also a good idea to know your test results and keep a list of the medicines you take. How can you care for yourself at home? · Practice healthy eating habits. This includes eating plenty of fruits, vegetables, whole grains, lean protein, and low-fat dairy. · If your doctor recommends it, get more exercise. Walking is a good choice. Bit by bit, increase the amount you walk every day. Try for at least 30 minutes on most days of the week. · Do not smoke. Smoking can increase your risk for health problems. If you need help quitting, talk to your doctor about stop-smoking programs and medicines. These can increase your chances of quitting for good. · Limit alcohol to 2 drinks a day for men and 1 drink a day for women. Too much alcohol can cause health problems. If you have a BMI higher than 25  · Your doctor may do other tests to check your risk for weight-related health problems. This may include measuring the distance around your waist. A waist measurement of more than 40 inches in men or 35 inches in women can increase the risk of weight-related health problems. · Talk with your doctor about steps you can take to stay healthy or improve your health. You may need to make lifestyle changes to lose weight and stay healthy, such as changing your diet and getting regular exercise. If you have a BMI lower than 18.5  · Your doctor may do other tests to check your risk for health problems. · Talk with your doctor about steps you can take to stay healthy or improve your health. You may need to make lifestyle changes to gain or maintain weight and stay healthy, such as getting more healthy foods in your diet and doing exercises to build muscle. Where can you learn more? Go to http://yulia-amilcar.info/. Enter S176 in the search box to learn more about \"Body Mass Index: Care Instructions. \"  Current as of: October 13, 2016  Content Version: 11.4  © 2894-8720 Healthwise, Incorporated. Care instructions adapted under license by [employer] (which disclaims liability or warranty for this information). If you have questions about a medical condition or this instruction, always ask your healthcare professional. [address] any warranty or liability for your use of this information.

## 2018-09-06 ENCOUNTER — OFFICE VISIT (OUTPATIENT)
Dept: BEHAVIORAL/MENTAL HEALTH CLINIC | Age: 28
End: 2018-09-06

## 2018-09-06 VITALS
BODY MASS INDEX: 29.64 KG/M2 | DIASTOLIC BLOOD PRESSURE: 77 MMHG | HEIGHT: 61 IN | WEIGHT: 157 LBS | HEART RATE: 72 BPM | SYSTOLIC BLOOD PRESSURE: 152 MMHG

## 2018-09-06 DIAGNOSIS — F41.3 OTHER MIXED ANXIETY DISORDERS: Primary | ICD-10-CM

## 2018-09-06 DIAGNOSIS — G47.00 INSOMNIA DISORDER WITH NON-SLEEP DISORDER MENTAL COMORBIDITY: ICD-10-CM

## 2018-09-06 RX ORDER — CLONAZEPAM 1 MG/1
1 TABLET ORAL
Qty: 30 TAB | Refills: 2 | Status: SHIPPED | OUTPATIENT
Start: 2018-09-06 | End: 2018-11-14 | Stop reason: SDUPTHER

## 2018-09-06 NOTE — MR AVS SNAPSHOT
[address] [facility] [address] [place] [address] 
[phone] Patient: Aashish Carter MRN: [id number] VAX:1/2/6318 Visit Information Date & Time Provider Department Dept. Phone Encounter #  
 9/6/2018 10:45 AM Nadia Arthur MD [phone] [id number] Follow-up Instructions Return in about 2 weeks (around 9/20/2018), or if symptoms worsen or fail to improve, for worsening symptoms, medication side effects. Your Appointments 9/10/2018  4:00 PM  
ECHO CARDIOGRAMS 2D with ECHO, [facility] ([facility] SCHEDULING) Appt Note: echo for palpitations, syncope, tachycardia [address]  [address] [facility] [address] [address]  
[address] [facility] [address]  
  
    
 9/18/2018  9:00 AM  
New Patient with Lele Stark MD  
[facility] On[facility] at [facility] Appt Note: Np referral from [address] [facility] for elevated WBC  
 [address] [facility] [address]  
[phone]  
  
   
 [address] [address]  
  
    
 9/21/2018 11:30 AM  
PAP with Kendy Camejo NP [facility] ([address]) Appt Note: 4 week f/up for Pap [address] [facility] [address] [facility] [address]  
[facility] [address] [place] [address]  
  
    
 9/27/2018  9:00 AM  
New Patient with Eli Tena DO [facility] at [facility] [address] [facility]) Appt Note: NP headaches/dizziness $0 09/04/2018nb [address] [facility] [address]  
[phone]  
  
   
 [address] [facility]  [address] Upcoming Health Maintenance Date Due  
 HPV Age 9Y-34Y (1 of 3 - Female 3 Dose Series) 3/3/2003 Pneumococcal 19-64 Highest Risk (1 of 3 - PCV13) 3/3/2011 DTaP/Tdap/Td series (1 - Tdap) 3/3/2013 PAP AKA CERVICAL CYTOLOGY 3/3/2013 Influenza Age 5 to Adult 3/31/2019* *Topic was postponed. The date shown is not the original due date. Allergies as of 9/6/2018  Review Complete On: 9/6/2018 By: Jaison Madera MD  
 No Known Allergies Current Immunizations  Never Reviewed No immunizations on file. Not reviewed this visit You Were Diagnosed With   
  
 Codes Comments Other mixed anxiety disorders    -  Primary ICD-10-CM: F41.3 ICD-9-CM: 300.09 Insomnia disorder with non-sleep disorder mental comorbidity     ICD-10-CM: G47.00 ICD-9-CM: 780.52 Vitals BP Pulse Height(growth percentile) Weight(growth percentile) LMP BMI  
 152/77 72 5' 1\" (1.549 m) 157 lb (71.2 kg) 08/08/2018 29.66 kg/m2 OB Status Smoking Status Having regular periods Former Smoker Vitals History BMI and BSA Data Body Mass Index Body Surface Area  
 29.66 kg/m 2 1.75 m 2 Preferred Pharmacy Pharmacy Name Phone [id number] [phone] Your Updated Medication List  
  
   
This list is accurate as of 9/6/18 11:18 AM.  Always use your most recent med list.  
  
  
  
  
 * albuterol 90 mcg/actuation inhaler Commonly known as:  PROVENTIL HFA, VENTOLIN HFA, PROAIR HFA Take  by inhalation. * albuterol 90 mcg/actuation inhaler Commonly known as:  PROVENTIL HFA, VENTOLIN HFA, PROAIR HFA Take 2 Puffs by inhalation every four (4) hours as needed for Wheezing. * albuterol 2.5 mg /3 mL (0.083 %) nebulizer solution Commonly known as:  PROVENTIL VENTOLIN  
3 mL by Nebulization route every four (4) hours as needed for Wheezing. clonazePAM 1 mg tablet Commonly known as:  Aniyah Blaze Take 1 Tab by mouth nightly. Max Daily Amount: 1 mg.  
  
 ketorolac 10 mg tablet Commonly known as:  TORADOL Take 1 Tab by mouth every six (6) hours as needed for Pain. LORazepam 0.5 mg tablet Commonly known as:  ATIVAN Take 1 Tab by mouth two (2) times a day. Max Daily Amount: 1 mg. Indications: Insomnia, panic attacks  
  
 traZODone 50 mg tablet Commonly known as:  Anthony Lipps Take 1/2-1 tablet as tolerated with a snack at bedtime for insomnia  Indications: insomnia associated with depression * Notice: This list has 3 medication(s) that are the same as other medications prescribed for you. Read the directions carefully, and ask your doctor or other care provider to review them with you. Prescriptions Printed Refills  
 clonazePAM (KLONOPIN) 1 mg tablet 2 Sig: Take 1 Tab by mouth nightly. Max Daily Amount: 1 mg. Class: Print Route: Oral  
  
Follow-up Instructions Return in about 2 weeks (around 9/20/2018), or if symptoms worsen or fail to improve, for worsening symptoms, medication side effects. Introducing [address]! Dear Katie Feliciano: 
Thank you for requesting a Sold account. Our records indicate that you already have an active Sold account. You can access your account anytime at [email or web address] Did you know that you can access your hospital and ER discharge instructions at any time in Sold? You can also review all of your test results from your hospital stay or ER visit. Additional Information If you have questions, please visit the Frequently Asked Questions section of the Sold website at [email or web address] Remember, Sold is NOT to be used for urgent needs. For medical emergencies, dial 911. Now available from your iPhone and Android! Please provide this summary of care documentation to your next provider. Your primary care clinician is listed as Raghav Newberry. If you have any questions after today's visit, please call [phone].

## 2018-09-06 NOTE — PROGRESS NOTES
Psychiatric Outpatient Progress Note    Account Number:  [id number]  Name: Alfred Casillas    SUBJECTIVE:   CHIEF COMPLAINT:  Alfred Casillas is a 34 y.o. female and was seen today for follow-up of insomnia, mixed anxiety disorder and psychotropic medication management. HPI:    Kristen Mosquera reports the following psychiatric symptoms:  anxiety and panic attacks. The symptoms have been present for despite initiation of Zoloft and Ativan  and are of moderate severity. The symptoms occur daily, more so when around people and her boyfriend drinking alcohol. She sleeps for 4 hours with the trazadone but wakes up several times with anxiety. She reported that the Zoloft caused her more restlessness and anxiety so she stopped it after 3-4 days. She reports that the Ativan 0.5mg tablet is not effective and she does better on 2 tablets when needed. She finds herself on high alert and vigilant which then renders her to cry profusely. .      Contributing factors include: fiancee drinking alcohol, triggers memories of alcoholic father. Patient denies SI/HI/SIB. Side Effects:  restlessness with Zoloft     Fam/Soc Hx (from [employer] with updates): no changes     REVIEW OF SYSTEMS:  Pertinent items are noted in HPI.     Visit Vitals    /77    Pulse 72    Ht 5' 1\" (1.549 m)    Wt 71.2 kg (157 lb)    LMP 08/08/2018    BMI 29.66 kg/m2       OBJECTIVE:                 Mental Status exam: WNL except for      Relations cooperative    Eye Contact    appropriate   Appearance:  age appropriate and casually dressed   Motor Behavior/Gait:  restless and within normal limits   Speech:  hyperverbal   Thought Process: goal directed, logical and within normal limits   Thought Content free of delusions, free of hallucinations and obsessions   Suicidal ideations no plan , no intention and none   Homicidal ideations no plan , no intention and none   Mood:  anxious and depressed   Affect:  anxious   Memory recent  adequate   Memory remote: adequate   Concentration:  adequate   Abstraction:  abstract   Insight:  good   Reliability good   Judgment:  good       MEDICAL DECISION MAKING:     Data: pertinent labs, imaging, medical records and diagnostic tests reviewed and incorporated in diagnosis and treatment plan    No Known Allergies     Current Outpatient Prescriptions   Medication Sig Dispense Refill    clonazePAM (KLONOPIN) 1 mg tablet Take 1 Tab by mouth nightly. Max Daily Amount: 1 mg. 30 Tab 2    albuterol (PROVENTIL HFA, VENTOLIN HFA, PROAIR HFA) 90 mcg/actuation inhaler Take  by inhalation.  albuterol (PROVENTIL HFA, VENTOLIN HFA, PROAIR HFA) 90 mcg/actuation inhaler Take 2 Puffs by inhalation every four (4) hours as needed for Wheezing. 1 Inhaler 0    albuterol (PROVENTIL VENTOLIN) 2.5 mg /3 mL (0.083 %) nebulizer solution 3 mL by Nebulization route every four (4) hours as needed for Wheezing. 24 Each 0    LORazepam (ATIVAN) 0.5 mg tablet Take 1 Tab by mouth two (2) times a day. Max Daily Amount: 1 mg. Indications: Insomnia, panic attacks 60 Tab 1    traZODone (DESYREL) 50 mg tablet Take 1/2-1 tablet as tolerated with a snack at bedtime for insomnia  Indications: insomnia associated with depression 30 Tab 1    ketorolac (TORADOL) 10 mg tablet Take 1 Tab by mouth every six (6) hours as needed for Pain. 20 Tab 0          Problems addressed today:    ICD-10-CM ICD-9-CM    1. Other mixed anxiety disorders F41.3 300.09 clonazePAM (KLONOPIN) 1 mg tablet   2. Insomnia disorder with non-sleep disorder mental comorbidity G47.00 780.52 clonazePAM (KLONOPIN) 1 mg tablet       Orders Placed This Encounter    clonazePAM (KLONOPIN) 1 mg tablet     Sig: Take 1 Tab by mouth nightly. Max Daily Amount: 1 mg. Dispense:  30 Tab     Refill:  2           Assessment:   Erwin Garcia  is a 34 y.o.  female  is not responding to treatment. Symptoms are unchanged. Patient denies SI/HI/SIB. No evidence of AH/VH or delusions.     Risk Scoring- chronic illnesses and prescription drug management    Treatment PlanTreatment plan reviewed with patient-including diagnosis and medications:  Symptoms targeted:     Goals:     1. Medications:          Medication Changes/Adjustments:   Discussed utilizing Klonopin 1mg po AT HS for now as it appears that SSRIs might be contributing to akathisia and probably not a good drug for her. Will also increase Ativan to 1mg prn for panic/anxiety but warned against constant use because of addictive potential in her case specifically. Her BP is high so needs better control for now. Will consider Tricyclics or seroquel in the future. Current Outpatient Prescriptions   Medication Sig Dispense Refill    clonazePAM (KLONOPIN) 1 mg tablet Take 1 Tab by mouth nightly. Max Daily Amount: 1 mg. 30 Tab 2    albuterol (PROVENTIL HFA, VENTOLIN HFA, PROAIR HFA) 90 mcg/actuation inhaler Take  by inhalation.  albuterol (PROVENTIL HFA, VENTOLIN HFA, PROAIR HFA) 90 mcg/actuation inhaler Take 2 Puffs by inhalation every four (4) hours as needed for Wheezing. 1 Inhaler 0    albuterol (PROVENTIL VENTOLIN) 2.5 mg /3 mL (0.083 %) nebulizer solution 3 mL by Nebulization route every four (4) hours as needed for Wheezing. 24 Each 0    LORazepam (ATIVAN) 0.5 mg tablet Take 1 Tab by mouth two (2) times a day. Max Daily Amount: 1 mg. Indications: Insomnia, panic attacks 60 Tab 1    traZODone (DESYREL) 50 mg tablet Take 1/2-1 tablet as tolerated with a snack at bedtime for insomnia  Indications: insomnia associated with depression 30 Tab 1    ketorolac (TORADOL) 10 mg tablet Take 1 Tab by mouth every six (6) hours as needed for Pain. 20 Tab 0                  The following regarding medications was addressed:    (The risks, benefits of the proposed medication and the potential medication side effects ie dry mouth, weight gain, GI upset, headache). The patient was given the opportunity to ask questions.   The patient was informed of the consequences of refusing medications and non-compliance. 2.  Counseling and coordination of care including instructions for treatment, risks/benefits, risk factor reduction and patient/family education. She agrees with the plan. 3.Patient instructed to call with any side effects, questions or issues. PSYCHOTHERAPY:  approx 15 minutes  (Supportive/Cognitive Behavioral/Solution Focused psychotherapy provided)       We reviewed her homework on her accomplishments which were quite impressive. She also shared her plight with anxiety and panic. She was advised to write down when she is distressed and how her boyfriend Savana Cheema reminds her of her alcoholic father when consuming alcohol and what feelings it evokes. .She agreed. Support given. Ms. Denisse Orr has a reminder for a \"due or due soon\" health maintenance. I have asked that she contact her primary care provider for follow-up on this health maintenance. Juliocesar Pill is not progressing. Follow-up Disposition:  Return in about 2 weeks (around 9/20/2018), or if symptoms worsen or fail to improve, for worsening symptoms, medication side effects.       Severa Leaven, MD  9/6/2018

## 2018-09-10 ENCOUNTER — CLINICAL SUPPORT (OUTPATIENT)
Dept: CARDIOLOGY CLINIC | Age: 28
End: 2018-09-10

## 2018-09-10 DIAGNOSIS — R01.1 MURMUR: ICD-10-CM

## 2018-09-10 DIAGNOSIS — R07.9 CHEST PAIN, UNSPECIFIED TYPE: ICD-10-CM

## 2018-09-10 DIAGNOSIS — R06.02 SOB (SHORTNESS OF BREATH): ICD-10-CM

## 2018-09-11 ENCOUNTER — CLINICAL SUPPORT (OUTPATIENT)
Dept: CARDIOLOGY CLINIC | Age: 28
End: 2018-09-11

## 2018-09-11 DIAGNOSIS — R00.2 PALPITATION: Primary | ICD-10-CM

## 2018-09-13 ENCOUNTER — TELEPHONE (OUTPATIENT)
Dept: CARDIOLOGY CLINIC | Age: 28
End: 2018-09-13

## 2018-09-13 NOTE — TELEPHONE ENCOUNTER
----- Message from Bartolome Aguero MD sent at 9/13/2018  8:24 AM EDT -----  Heart muscle is strong and valves all ok.  Looks great

## 2018-09-14 NOTE — TELEPHONE ENCOUNTER
Called patient. Verified patient's identity with two identifiers. Notified patient of results and Dr. Chelsea Nieto message. Patient also mentioned she will be sending back heart monitor. Patient verbalizes understanding and denies further questions or concerns.

## 2018-09-18 ENCOUNTER — TELEPHONE (OUTPATIENT)
Dept: BEHAVIORAL/MENTAL HEALTH CLINIC | Age: 28
End: 2018-09-18

## 2018-09-18 NOTE — TELEPHONE ENCOUNTER
Pt states she is trying to get a refill from her pharmacy but they wont refill until tomorrow. Pt states you increase her Lorazepam from 0.5mg to 1 mg, she couldn't give me a clear answer as to how many or often you told her to take this medication. She states sometimes she doesn't take the medication and sometime sshe takes this medication 3 to 4 times a day.  She wanted to know if you will change the prescription to reflect what you said so she doesn't have problems in the future with the pharmacist.

## 2018-09-19 ENCOUNTER — TELEPHONE (OUTPATIENT)
Dept: BEHAVIORAL/MENTAL HEALTH CLINIC | Age: 28
End: 2018-09-19

## 2018-09-19 DIAGNOSIS — F41.8 ANXIETY WITH LIMITED-SYMPTOM ATTACKS: Primary | ICD-10-CM

## 2018-09-19 RX ORDER — LORAZEPAM 1 MG/1
TABLET ORAL
Qty: 60 TAB | Refills: 1 | Status: SHIPPED | OUTPATIENT
Start: 2018-09-19 | End: 2018-10-23 | Stop reason: SDUPTHER

## 2018-09-19 NOTE — TELEPHONE ENCOUNTER
This provider called her pharmacy and left a message to increase dose of Lorazepam to 1mg bid prn for now #60 with 1 refill. This was shared with the patient. She will be seen on Friday for her follow up visit.

## 2018-09-20 ENCOUNTER — TELEPHONE (OUTPATIENT)
Dept: CARDIOLOGY CLINIC | Age: 28
End: 2018-09-20

## 2018-09-20 NOTE — TELEPHONE ENCOUNTER
Spoke with patient. Two patient indentifiers verified. Gave patient monitor results. Pt stated she has sent the monitor back because it was tearing up her skin. Pt verbalized understanding and denies any questions at this time.

## 2018-09-24 ENCOUNTER — OFFICE VISIT (OUTPATIENT)
Dept: BEHAVIORAL/MENTAL HEALTH CLINIC | Age: 28
End: 2018-09-24

## 2018-09-24 VITALS
SYSTOLIC BLOOD PRESSURE: 137 MMHG | BODY MASS INDEX: 30.58 KG/M2 | DIASTOLIC BLOOD PRESSURE: 76 MMHG | WEIGHT: 162 LBS | HEART RATE: 79 BPM | HEIGHT: 61 IN

## 2018-09-24 DIAGNOSIS — G47.00 INSOMNIA DISORDER WITH NON-SLEEP DISORDER MENTAL COMORBIDITY: Primary | ICD-10-CM

## 2018-09-24 DIAGNOSIS — F41.3 OTHER MIXED ANXIETY DISORDERS: ICD-10-CM

## 2018-09-24 NOTE — PROGRESS NOTES
Psychiatric Outpatient Progress Note    Account Number:  [id number]  Name: Louis Chacon    SUBJECTIVE:     CHIEF COMPLAINT:    HPI:  Louis Chacon is a 34 y.o. female and was seen today for follow-up of psychiatric condition and psychotropic medication management. Louis Chacon is a 34 y.o. female and was seen today for follow-up of insomnia, mixed anxiety disorder and psychotropic medication management.      HPI:    Juliocesar Patterson reports the following psychiatric symptoms:  anxiety and panic attacks. The symptoms have been present for despite initiation of Zoloft and Ativan  and are of moderate severity. The symptoms occur daily, more so when around people and her boyfriend drinking alcohol. She sleeps for 4 hours with the trazadone but wakes up several times with anxiety. She reported that the Zoloft caused her more restlessness and anxiety so she stopped it after 3-4 days. She reports that the Ativan 0.5mg tablet is not effective and she does better on 2 tablets when needed. She finds herself on high alert and vigilant which then renders her to cry profusely. .       Contributing factors include: fiancee drinking alcohol, triggers memories of alcoholic father. Patient denies SI/HI/SIB.      Side Effects:  restlessness with Zoloft     Course of events since last visit: She reports doing better on the current Ativan dose. She is sleeping better with the Klonopin as well. She is looking for employment opportunities and her father is doing ok. She moved in with her parents with her BF. She is more at ease. 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 Patient denies SI/HI/SIB. Side Effects:  none      Fam/Soc Hx (from [place] with updates): Moved in with Parents. BF is doing well with occasional alcohol use. REVIEW OF SYSTEMS:  Pertinent items are noted in HPI.     Visit Vitals    /76    Pulse 79    Ht 5' 1\" (1.549 m)    Wt 73.5 kg (162 lb)    LMP 09/08/2018    BMI 30.61 kg/m2       OBJECTIVE:                 Mental Status exam: WNL except for      Attitude/Behavior     Eye Contact    appropriate   Appearance:  age appropriate and well dressed   Motor Behavior/Gait:  within normal limits   Speech:  normal pitch, normal volume and non-pressured   Thought Process: goal directed and logical   Thought Content free of delusions and free of hallucinations   Perceptual distortions  Patient denied any visual,auditory,olfactory or gustatory hallucinations. No illusions were reported or noted eithter   Suicidal ideations no plan  and no intention   Homicidal ideations no plan  and no intention   Mood:  stable   Affect:  stable     Orientation/sensorium  Alert and oriented to  date,place, situation and persons   Memory recent  adequate   Memory remote:  adequate   Concentration:  adequate   Abstraction:  abstract   Insight:  fair   Reliability fair   Judgment:  fair       MEDICAL DECISION MAKING:     Data: pertinent labs, imaging, medical records and diagnostic tests reviewed and incorporated in diagnosis and treatment plan    No Known Allergies     Current Outpatient Prescriptions   Medication Sig Dispense Refill    LORazepam (ATIVAN) 1 mg tablet Take 1 tablet twice daily as needed for panic attacks and anxiety 60 Tab 1    clonazePAM (KLONOPIN) 1 mg tablet Take 1 Tab by mouth nightly. Max Daily Amount: 1 mg. 30 Tab 2    albuterol (PROVENTIL HFA, VENTOLIN HFA, PROAIR HFA) 90 mcg/actuation inhaler Take  by inhalation.  albuterol (PROVENTIL HFA, VENTOLIN HFA, PROAIR HFA) 90 mcg/actuation inhaler Take 2 Puffs by inhalation every four (4) hours as needed for Wheezing. 1 Inhaler 0    albuterol (PROVENTIL VENTOLIN) 2.5 mg /3 mL (0.083 %) nebulizer solution 3 mL by Nebulization route every four (4) hours as needed for Wheezing.  24 Each 0    traZODone (DESYREL) 50 mg tablet Take 1/2-1 tablet as tolerated with a snack at bedtime for insomnia  Indications: insomnia associated with depression 30 Tab 1    ketorolac (TORADOL) 10 mg tablet Take 1 Tab by mouth every six (6) hours as needed for Pain. 20 Tab 0          Problems addressed today:    ICD-10-CM ICD-9-CM    1. Insomnia disorder with non-sleep disorder mental comorbidity G47.00 780.52    2. Other mixed anxiety disorders F41.3 300.09        No orders of the defined types were placed in this encounter. Assessment:   Vira Payne  is a 34 y.o.  female  is responding to treatment. Symptoms are of less frequency and intensity. Patient denies SI/HI/SIB. No evidence of AH/VH or delusions. Risk Scoring- chronic illnesses and prescription drug management    Treatment PlanTreatment plan reviewed with patient-including diagnosis and medications:  Symptoms targeted: Anxiety and panic attacks  1. Medications:          Medication Changes/Adjustments: no changes at this time. Will continue same medications and advised to avoid taking all two ativans per day in order to prevent tolerance. She agreed. Current Outpatient Prescriptions   Medication Sig Dispense Refill    LORazepam (ATIVAN) 1 mg tablet Take 1 tablet twice daily as needed for panic attacks and anxiety 60 Tab 1    clonazePAM (KLONOPIN) 1 mg tablet Take 1 Tab by mouth nightly. Max Daily Amount: 1 mg. 30 Tab 2    albuterol (PROVENTIL HFA, VENTOLIN HFA, PROAIR HFA) 90 mcg/actuation inhaler Take  by inhalation.  albuterol (PROVENTIL HFA, VENTOLIN HFA, PROAIR HFA) 90 mcg/actuation inhaler Take 2 Puffs by inhalation every four (4) hours as needed for Wheezing. 1 Inhaler 0    albuterol (PROVENTIL VENTOLIN) 2.5 mg /3 mL (0.083 %) nebulizer solution 3 mL by Nebulization route every four (4) hours as needed for Wheezing.  24 Each 0    traZODone (DESYREL) 50 mg tablet Take 1/2-1 tablet as tolerated with a snack at bedtime for insomnia  Indications: insomnia associated with depression 30 Tab 1    ketorolac (TORADOL) 10 mg tablet Take 1 Tab by mouth every six (6) hours as needed for Pain. 20 Tab 0                  The following regarding medications was addressed:    (The risks, benefits of the proposed medication and the potential medication side effects ie dry mouth, weight gain, GI upset, headache). The patient was given the opportunity to ask questions. The patient was informed of the consequences of refusing medications and non-compliance. 2.  Counseling and coordination of care including instructions for treatment, risks/benefits, risk factor reduction and patient/family education. She agrees with the plan. 3.Patient instructed to call with any side effects, questions or issues. PSYCHOTHERAPY:  approx 15 minutes  (Supportive/Cognitive Behavioral/Solution Focused psychotherapy provided)  Homework given regarding:   Psychoeducation with handouts provided:  None              Ms. Aaron Blum has a reminder for a \"due or due soon\" health maintenance. I have asked that she contact her primary care provider for follow-up on this health maintenance. Yi Mcbride is progressing. Follow-up Disposition:  Return in about 4 weeks (around 10/22/2018).       Juan Barragan MD  9/26/2018

## 2018-09-24 NOTE — MR AVS SNAPSHOT
[address] [facility] [address] [place] [address] 
[phone] Patient: Mansi Morfin MRN: [id number] Visit Information Date & Time Provider Department Dept. Phone Encounter #  
 9/24/2018  1:00 PM Keanu Mccrary MD [id number] [facility] [phone] [id number] Follow-up Instructions Return in about 4 weeks (around 10/22/2018). Your Appointments 9/27/2018  9:00 AM  
New Patient with Sanchez Love,  [facility] at [facility] [address] [facility]) Appt Note: NP headaches/dizziness $0 09/04/[address] [address] [phone]  
  
   
 [address] [facility] [id number]  
  
    
 10/10/2018  9:00 AM  
New Patient with Claus Perera MD  
[facility] at [facility] Appt Note: Np referral from [address] [place], [place] [address] [facility] for elevated WBC; Np referral from [address] [place] [address] for elevated WBC  
 [address] [address]  
[phone]  
  
   
 [address] [facility] [address] Upcoming Health Maintenance Date Due  
 HPV Age 9Y-34Y (1 of 3 - Female 3 Dose Series) 3/3/2003 Pneumococcal 19-64 Highest Risk (1 of 3 - PCV13) 3/3/2011 DTaP/Tdap/Td series (1 - Tdap) 3/3/2013 PAP AKA CERVICAL CYTOLOGY 3/3/2013 Influenza Age 5 to Adult 3/31/2019* *Topic was postponed. The date shown is not the original due date. Allergies as of 9/24/2018  Review Complete On: 9/24/2018 By: Keanu Mccrary MD  
 No Known Allergies Current Immunizations  Never Reviewed No immunizations on file. Not reviewed this visit You Were Diagnosed With   
  
 Codes Comments Insomnia disorder with non-sleep disorder mental comorbidity    -  Primary ICD-10-CM: G47.00 ICD-9-CM: 780.52 Other mixed anxiety disorders     ICD-10-CM: F41.3 ICD-9-CM: 300.09 Vitals BP Pulse Height(growth percentile) Weight(growth percentile) LMP BMI  
 137/76 79 5' 1\" (1.549 m) 162 lb (73.5 kg) 09/08/2018 30.61 kg/m2 OB Status Smoking Status Having regular periods Former Smoker Vitals History BMI and BSA Data Body Mass Index Body Surface Area  
 30.61 kg/m 2 1.78 m 2 Preferred Pharmacy Pharmacy Name Phone [id number] [phone] Your Updated Medication List  
  
   
This list is accurate as of 9/24/18  1:25 PM.  Always use your most recent med list.  
  
  
  
  
 * albuterol 90 mcg/actuation inhaler Commonly known as:  PROVENTIL HFA, VENTOLIN HFA, PROAIR HFA Take  by inhalation. * albuterol 90 mcg/actuation inhaler Commonly known as:  PROVENTIL HFA, VENTOLIN HFA, PROAIR HFA Take 2 Puffs by inhalation every four (4) hours as needed for Wheezing. * albuterol 2.5 mg /3 mL (0.083 %) nebulizer solution Commonly known as:  PROVENTIL VENTOLIN  
3 mL by Nebulization route every four (4) hours as needed for Wheezing. clonazePAM 1 mg tablet Commonly known as:  Bonita Romo Take 1 Tab by mouth nightly. Max Daily Amount: 1 mg.  
  
 ketorolac 10 mg tablet Commonly known as:  TORADOL Take 1 Tab by mouth every six (6) hours as needed for Pain. LORazepam 1 mg tablet Commonly known as:  ATIVAN Take 1 tablet twice daily as needed for panic attacks and anxiety  
  
 traZODone 50 mg tablet Commonly known as:  Tyron Bump Take 1/2-1 tablet as tolerated with a snack at bedtime for insomnia  Indications: insomnia associated with depression * Notice: This list has 3 medication(s) that are the same as other medications prescribed for you. Read the directions carefully, and ask your doctor or other care provider to review them with you. Follow-up Instructions Return in about 4 weeks (around 10/22/2018). Patient Instructions Please complete your homework Please try to take less of the Ativan in order to avoid becoming tolerant Please know that if you run out of your Ativan or KLonopin before it's time, we will not be able to fill it. Focus is to help you over come your anxiety and fear thru behavioral means Introducing \[facility] [place] [facility]\"" & HEALTH SERVICES! Dear Michell Lemosor: 
Thank you for requesting a [employer] account. Our records indicate that you already have an active [employer] account. You can access your account anytime at [email or web address]. [email or web address] Did you know that you can access your hospital and ER discharge instructions at any time in [employer]? You can also review all of your test results from your hospital stay or ER visit. Additional Information If you have questions, please visit the Frequently Asked Questions section of the [employer] website at [email or web address] Remember, [employer] is NOT to be used for urgent needs. For medical emergencies, dial 911. Now available from your iPhone and Android! Please provide this summary of care documentation to your next provider. Your primary care clinician is listed as Lauren Carson. If you have any questions after today's visit, please call [phone].

## 2018-09-24 NOTE — PATIENT INSTRUCTIONS
Please complete your homework    Please try to take less of the Ativan in order to avoid becoming tolerant    Please know that if you run out of your Ativan or KLonopin before it's time, we will not be able to fill it.     Focus is to help you over come your anxiety and fear thru behavioral means

## 2018-10-19 DIAGNOSIS — G47.00 INSOMNIA DISORDER WITH NON-SLEEP DISORDER MENTAL COMORBIDITY: ICD-10-CM

## 2018-10-19 RX ORDER — TRAZODONE HYDROCHLORIDE 50 MG/1
TABLET ORAL
Qty: 30 TAB | Refills: 1 | Status: SHIPPED | OUTPATIENT
Start: 2018-10-19 | End: 2018-11-14 | Stop reason: SDUPTHER

## 2018-10-23 DIAGNOSIS — F41.8 ANXIETY WITH LIMITED-SYMPTOM ATTACKS: ICD-10-CM

## 2018-10-23 RX ORDER — LORAZEPAM 1 MG/1
TABLET ORAL
Qty: 60 TAB | Refills: 1 | Status: SHIPPED | OUTPATIENT
Start: 2018-10-23 | End: 2018-12-21 | Stop reason: SDUPTHER

## 2018-11-14 ENCOUNTER — OFFICE VISIT (OUTPATIENT)
Dept: BEHAVIORAL/MENTAL HEALTH CLINIC | Age: 28
End: 2018-11-14

## 2018-11-14 VITALS
BODY MASS INDEX: 31.91 KG/M2 | WEIGHT: 169 LBS | HEIGHT: 61 IN | HEART RATE: 85 BPM | SYSTOLIC BLOOD PRESSURE: 134 MMHG | DIASTOLIC BLOOD PRESSURE: 79 MMHG

## 2018-11-14 DIAGNOSIS — G47.00 INSOMNIA DISORDER WITH NON-SLEEP DISORDER MENTAL COMORBIDITY: ICD-10-CM

## 2018-11-14 DIAGNOSIS — F41.3 OTHER MIXED ANXIETY DISORDERS: Primary | ICD-10-CM

## 2018-11-14 DIAGNOSIS — F32.89 MINOR DEPRESSIVE DISORDER: ICD-10-CM

## 2018-11-14 RX ORDER — TRAZODONE HYDROCHLORIDE 50 MG/1
TABLET ORAL
Qty: 30 TAB | Refills: 1 | Status: SHIPPED | OUTPATIENT
Start: 2018-11-14 | End: 2019-02-12 | Stop reason: SDUPTHER

## 2018-11-14 RX ORDER — BUPROPION HYDROCHLORIDE 150 MG/1
150 TABLET ORAL
Qty: 30 TAB | Refills: 2 | Status: SHIPPED | OUTPATIENT
Start: 2018-11-14 | End: 2018-12-07 | Stop reason: DRUGHIGH

## 2018-11-14 RX ORDER — CLONAZEPAM 1 MG/1
1 TABLET ORAL
Qty: 30 TAB | Refills: 2 | Status: SHIPPED | OUTPATIENT
Start: 2018-11-14 | End: 2019-02-13 | Stop reason: SDUPTHER

## 2018-11-14 NOTE — PROGRESS NOTES
Psychiatric Outpatient Progress Note    Account Number:  [id number]  Name: Gabriela Abdi    SUBJECTIVE:     CHIEF COMPLAINT:    HPI:  Gabriela Abdi is a 34 y.o. female and was seen today for follow-up of psychiatric condition and psychotropic medication management. Gabriela Abdi is a 34 y.o. female and was seen today for follow-up of insomnia, mixed anxiety disorder and psychotropic medication management.      HPI:    Jael Church reports the following psychiatric symptoms:  anxiety and panic attacks. The symptoms have been present for despite initiation of Zoloft and Ativan  and are of moderate severity. The symptoms occur daily, more so when around people and her boyfriend drinking alcohol. She sleeps for 4 hours with the trazadone but wakes up several times with anxiety. She reported that the Zoloft caused her more restlessness and anxiety so she stopped it after 3-4 days. She reports that the Ativan 0.5mg tablet is not effective and she does better on 2 tablets when needed. She finds herself on high alert and vigilant which then renders her to cry profusely. .       Contributing factors include: fiancee drinking alcohol, triggers memories of alcoholic father. Patient denies SI/HI/SIB.      Side Effects:  restlessness with Zoloft     Course of events since last visit: She returns feeling depressed with suicidal thoughts but no intentions. The anniversaries of her grandmother's death, her uncles death are approaching while her mother suffers from moderate COPD for which she was hospitalized recently. She is dreading her mother's death, her brother leaving the home for broader horizons while she remains stagnant. She is feeling a sense of failure but mostly worried about abandonment issues. She is struggling financially as well. Patient denies SI/HI/SIB. Side Effects:  none      Fam/Soc Hx (from [place] with updates): Moved in with Parents. BF is doing well with occasional alcohol use.      REVIEW OF SYSTEMS:  Pertinent items are noted in HPI. Visit Vitals  /79   Pulse 85   Ht 5' 1\" (1.549 m)   Wt 76.7 kg (169 lb)   LMP 11/09/2018   BMI 31.93 kg/m²       OBJECTIVE:                 Mental Status exam: WNL except for      Attitude/Behavior  Pleasant and cooperative    Eye Contact    appropriate   Appearance:  age appropriate and well dressed   Motor Behavior/Gait:  within normal limits   Speech:  normal pitch, normal volume and non-pressured   Thought Process: goal directed and logical   Thought Content free of delusions and free of hallucinations   Perceptual distortions  Patient denied any visual,auditory,olfactory or gustatory hallucinations. No illusions were reported or noted eithter   Suicidal ideations no plan  and no intention but had suicidal thoughts   Homicidal ideations no plan  and no intention   Mood:  Depressed, sad and hopeless   Affect:  dysphoric     Orientation/sensorium  Alert and oriented to  date,place, situation and persons   Memory recent  adequate   Memory remote:  adequate   Concentration:  adequate   Abstraction:  abstract   Insight:  fair   Reliability fair   Judgment:  fair       MEDICAL DECISION MAKING:     Data: pertinent labs, imaging, medical records and diagnostic tests reviewed and incorporated in diagnosis and treatment plan    No Known Allergies     Current Outpatient Medications   Medication Sig Dispense Refill    traZODone (DESYREL) 50 mg tablet TAKE 1/2 TO 1 TABLET AS TOLERATED WITH A SNACK AT BEDTIME FOR INSOMNIA 30 Tab 1    clonazePAM (KLONOPIN) 1 mg tablet Take 1 Tab by mouth nightly. Max Daily Amount: 1 mg. 30 Tab 2    buPROPion XL (WELLBUTRIN XL) 150 mg tablet Take 1 Tab by mouth every morning. 30 Tab 2    LORazepam (ATIVAN) 1 mg tablet Take 1 tablet twice daily as needed for panic attacks and anxiety 60 Tab 1    albuterol (PROVENTIL HFA, VENTOLIN HFA, PROAIR HFA) 90 mcg/actuation inhaler Take  by inhalation.       ketorolac (TORADOL) 10 mg tablet Take 1 Tab by mouth every six (6) hours as needed for Pain. 20 Tab 0    albuterol (PROVENTIL HFA, VENTOLIN HFA, PROAIR HFA) 90 mcg/actuation inhaler Take 2 Puffs by inhalation every four (4) hours as needed for Wheezing. 1 Inhaler 0    albuterol (PROVENTIL VENTOLIN) 2.5 mg /3 mL (0.083 %) nebulizer solution 3 mL by Nebulization route every four (4) hours as needed for Wheezing. 24 Each 0          Problems addressed today:    ICD-10-CM ICD-9-CM    1. Other mixed anxiety disorders F41.3 300.09 clonazePAM (KLONOPIN) 1 mg tablet   2. 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 Insomnia disorder with non-sleep disorder mental comorbidity G47.00 780.52 traZODone (DESYREL) 50 mg tablet      clonazePAM (KLONOPIN) 1 mg tablet       Orders Placed This Encounter    traZODone (DESYREL) 50 mg tablet     Sig: TAKE 1/2 TO 1 TABLET AS TOLERATED WITH A SNACK AT BEDTIME FOR INSOMNIA     Dispense:  30 Tab     Refill:  1    clonazePAM (KLONOPIN) 1 mg tablet     Sig: Take 1 Tab by mouth nightly. Max Daily Amount: 1 mg. Dispense:  30 Tab     Refill:  2    buPROPion XL (WELLBUTRIN XL) 150 mg tablet     Sig: Take 1 Tab by mouth every morning. Dispense:  30 Tab     Refill:  2       Assessment:   Maria Espinoza  is a 34 y.o.  female  is responding to treatment in respect to anxiety and insomnia but is developing depressive symptoms with SI. She finds herself less motivated and more hopeless. Patient denies SI/HI/SIB. No evidence of AH/VH or delusions. Risk Scoring- chronic illnesses and prescription drug management    Treatment PlanTreatment plan reviewed with patient-including diagnosis and medications:           Medication Changes/Adjustments: Discussed using Wellbutrin for her depressive symptoms which might help attention and focus. She agreed. Wellbutrin XL 150mg po daily ordered.     Current Outpatient Medications   Medication Sig Dispense Refill    traZODone (DESYREL) 50 mg tablet TAKE 1/2 TO 1 TABLET AS TOLERATED WITH A SNACK AT BEDTIME FOR INSOMNIA 30 Tab 1    clonazePAM (KLONOPIN) 1 mg tablet Take 1 Tab by mouth nightly. Max Daily Amount: 1 mg. 30 Tab 2    buPROPion XL (WELLBUTRIN XL) 150 mg tablet Take 1 Tab by mouth every morning. 30 Tab 2    LORazepam (ATIVAN) 1 mg tablet Take 1 tablet twice daily as needed for panic attacks and anxiety 60 Tab 1    albuterol (PROVENTIL HFA, VENTOLIN HFA, PROAIR HFA) 90 mcg/actuation inhaler Take  by inhalation.  ketorolac (TORADOL) 10 mg tablet Take 1 Tab by mouth every six (6) hours as needed for Pain. 20 Tab 0    albuterol (PROVENTIL HFA, VENTOLIN HFA, PROAIR HFA) 90 mcg/actuation inhaler Take 2 Puffs by inhalation every four (4) hours as needed for Wheezing. 1 Inhaler 0    albuterol (PROVENTIL VENTOLIN) 2.5 mg /3 mL (0.083 %) nebulizer solution 3 mL by Nebulization route every four (4) hours as needed for Wheezing. 24 Each 0                  The following regarding medications was addressed:    (The risks, benefits of the proposed medication and the potential medication side effects ie dry mouth, weight gain, GI upset, headache). The patient was given the opportunity to ask questions. The patient was informed of the consequences of refusing medications and non-compliance. 2.  Counseling and coordination of care including instructions for treatment, risks/benefits, risk factor reduction and patient/family education. She agrees with the plan. 3.Patient instructed to call with any side effects, questions or issues. PSYCHOTHERAPY:  approx 20 minutes  Supportive/Cognitive Behavioral/Solution Focused psychotherapy provided: Explored the death of her grandmother ( whom she looked up to)and how she handled distress and frustrations. Challenged irrational thoughts with logical,practical ones. Hope instilled. She was receptive. Homework given regarding: none  Psychoeducation with handouts provided:  None     Ms. Jane Doran has a reminder for a \"due or due soon\" health maintenance. I have asked that she contact her primary care provider for follow-up on this health maintenance. Parviz Hinson is progressing anxiety wise, not as anxious or panicky, utilizing relaxation techniques but is more on the depressive side. Follow-up Disposition:  Return in about 4 weeks (around 12/12/2018), or if symptoms worsen or fail to improve, for worsening symptoms, medication side effects.       Fide Sears MD  11/14/2018

## 2018-11-26 ENCOUNTER — TELEPHONE (OUTPATIENT)
Dept: ONCOLOGY | Age: 28
End: 2018-11-26

## 2018-11-26 NOTE — TELEPHONE ENCOUNTER
New pt requesting to r/s upcoming appt scheduled Friday 12/07/2018 for a later time in the day preferably around 1:45- 2 PM.   Best contact number [phone]

## 2018-12-07 ENCOUNTER — OFFICE VISIT (OUTPATIENT)
Dept: BEHAVIORAL/MENTAL HEALTH CLINIC | Age: 28
End: 2018-12-07

## 2018-12-07 ENCOUNTER — OFFICE VISIT (OUTPATIENT)
Dept: ONCOLOGY | Age: 28
End: 2018-12-07

## 2018-12-07 VITALS
WEIGHT: 169.8 LBS | DIASTOLIC BLOOD PRESSURE: 69 MMHG | SYSTOLIC BLOOD PRESSURE: 103 MMHG | BODY MASS INDEX: 32.06 KG/M2 | OXYGEN SATURATION: 98 % | HEART RATE: 70 BPM | RESPIRATION RATE: 20 BRPM | HEIGHT: 61 IN | TEMPERATURE: 98 F

## 2018-12-07 VITALS
BODY MASS INDEX: 32.02 KG/M2 | HEART RATE: 69 BPM | SYSTOLIC BLOOD PRESSURE: 115 MMHG | WEIGHT: 169.6 LBS | HEIGHT: 61 IN | DIASTOLIC BLOOD PRESSURE: 59 MMHG | RESPIRATION RATE: 16 BRPM

## 2018-12-07 DIAGNOSIS — D75.839 THROMBOCYTOSIS: Primary | ICD-10-CM

## 2018-12-07 DIAGNOSIS — G47.00 INSOMNIA DISORDER WITH NON-SLEEP DISORDER MENTAL COMORBIDITY: ICD-10-CM

## 2018-12-07 DIAGNOSIS — F17.200 SMOKER: ICD-10-CM

## 2018-12-07 DIAGNOSIS — F32.89 MINOR DEPRESSIVE DISORDER: ICD-10-CM

## 2018-12-07 DIAGNOSIS — F32.89 MINOR DEPRESSIVE DISORDER: Primary | ICD-10-CM

## 2018-12-07 DIAGNOSIS — F41.3 OTHER MIXED ANXIETY DISORDERS: ICD-10-CM

## 2018-12-07 RX ORDER — BUPROPION HYDROCHLORIDE 300 MG/1
300 TABLET ORAL
Qty: 90 TAB | Refills: 1 | Status: SHIPPED | OUTPATIENT
Start: 2018-12-07 | End: 2019-06-29

## 2018-12-07 NOTE — PATIENT INSTRUCTIONS
How to Get Up Safely After a Fall: Care Instructions Your Care Instructions If you have injuries, health problems, or other reasons that may make it easy for you to fall at home, it is a good idea to learn how to get up safely after a fall. Learning how to get up correctly can help you avoid making an injury worse. Also, knowing what to do if you cannot get up can help you stay safe until help arrives. Follow-up care is a key part of your treatment and safety. Be sure to make and go to all appointments, and call your doctor if you are having problems. It's also a good idea to know your test results and keep a list of the medicines you take. How can you care for yourself after a fall? If you think you can get up First lie still for a few minutes and think about how you feel. If your body feels okay and you think you can get up safely, follow the rest of the steps below: 1. Look for a chair or other piece of furniture that is close to you. 2. Roll onto your side and rest. Roll by turning your head in the direction you want to roll, move your shoulder and arm, then hip and leg in the same direction. 3. Lie still for a moment to let your blood pressure adjust. 
4. Slowly push your upper body up, lift your head, and take a moment to rest. 
5. Slowly get up on your hands and knees, and crawl to the chair or other stable piece of furniture. 6. Put your hands on the chair. 7. Move one foot forward, and place it flat on the floor. Your other leg should be bent with the knee on the floor. 8. Rise slowly, turn your body, and sit in the chair. Stay seated for a bit and think about how you feel. Call for help. Even if you feel okay, let someone know what happened to you. You might not know that you have a serious injury. If you cannot get up 1. If you think you are injured after a fall or you cannot get up, try not to panic. 2. Call out for help. 3. If you have a phone within reach or you have an emergency call device, use it to call for help. 4. If you do not have a phone within reach, try to slide yourself toward it. If you cannot get to the phone, try to slide toward a door or window or a place where you think you can be heard. 5. Coal or use an object to make noise so someone might hear you. 6. If you can reach something that you can use for a pillow, place it under your head. Try to stay warm by covering yourself with a blanket or clothing while you wait for help. When should you call for help? Call 911 anytime you think you may need emergency care. For example, call if: 
  · You passed out (lost consciousness).  
  · You cannot get up after a fall.  
  · You have severe pain.  
 Call your doctor now or seek immediate medical care if: 
  · You have new or worse pain.  
  · You are dizzy or lightheaded.  
  · You hit your head.  
 Watch closely for changes in your health, and be sure to contact your doctor if: 
  · You do not get better as expected. Where can you learn more? Go to http://yulia-amilcar.info/. Enter Y436 in the search box to learn more about \"How to Get Up Safely After a Fall: Care Instructions. \" Current as of: March 16, 2018 Content Version: 11.8 © 2622-9039 [employer]. Care instructions adapted under license by [employer] (which disclaims liability or warranty for this information). If you have questions about a medical condition or this instruction, always ask your healthcare professional. [address] any warranty or liability for your use of this information.

## 2018-12-07 NOTE — PROGRESS NOTES
Psychiatric Outpatient Progress Note    Account Number:  [id number]  Name: Letha De Leon    SUBJECTIVE:     CHIEF COMPLAINT:    HPI:  Letha De Leon is a 34 y.o. female and was seen today for follow-up of psychiatric condition and psychotropic medication management. Letha De Leon is a 34 y.o. female and was seen today for follow-up of insomnia, mixed anxiety disorder and psychotropic medication management.      HPI:    Shaunna Velasquez reports the following psychiatric symptoms:  anxiety and panic attacks. The symptoms have been present for despite initiation of Zoloft and Ativan  and are of moderate severity. The symptoms occur daily, more so when around people and her boyfriend drinking alcohol. She sleeps for 4 hours with the trazadone but wakes up several times with anxiety. She reported that the Zoloft caused her more restlessness and anxiety so she stopped it after 3-4 days. She reports that the Ativan 0.5mg tablet is not effective and she does better on 2 tablets when needed. She finds herself on high alert and vigilant which then renders her to cry profusely. .       Contributing factors include: fiancee drinking alcohol, triggers memories of alcoholic father. Patient denies SI/HI/SIB.      Side Effects:  restlessness with Zoloft     Course of events since last visit: She was placed on Wellbutrin XL 150mg and returns feeling much better but not optimally there. Is going on vacation next week, boyfriend was present. Patient denies SI/HI/SIB. Side Effects:  none      Fam/Soc Hx (from [place] with updates): Moved in with Parents. BF is doing well with occasional alcohol use. REVIEW OF SYSTEMS:  Pertinent items are noted in HPI.     Visit Vitals  /59 (BP 1 Location: Right arm, BP Patient Position: Sitting)   Pulse 69   Resp 16   Ht 5' 1\" (1.549 m)   Wt 76.9 kg (169 lb 9.6 oz)   LMP 11/09/2018   BMI 32.05 kg/m²       OBJECTIVE:                 Mental Status exam: WNL except for      Attitude/Behavior Pleasant and cooperative    Eye Contact    appropriate   Appearance:  age appropriate and well dressed   Motor Behavior/Gait:  within normal limits   Speech:  normal pitch, normal volume and non-pressured   Thought Process: goal directed and logical   Thought Content free of delusions and free of hallucinations   Perceptual distortions  Patient denied any visual,auditory,olfactory or gustatory hallucinations. No illusions were reported or noted eithter   Suicidal ideations no plan  and no intention but had suicidal thoughts   Homicidal ideations no plan  and no intention   Mood:  Brighter, better   Affect:  Appeared upbeat and euthymic     Orientation/sensorium  Alert and oriented to  date,place, situation and persons   Memory recent  adequate   Memory remote:  adequate   Concentration:  adequate   Abstraction:  abstract   Insight:  fair   Reliability fair   Judgment:  fair       MEDICAL DECISION MAKING:     Data: pertinent labs, imaging, medical records and diagnostic tests reviewed and incorporated in diagnosis and treatment plan    No Known Allergies     Current Outpatient Medications   Medication Sig Dispense Refill    buPROPion XL (WELLBUTRIN XL) 300 mg XL tablet Take 1 Tab by mouth every morning. 90 Tab 1    traZODone (DESYREL) 50 mg tablet TAKE 1/2 TO 1 TABLET AS TOLERATED WITH A SNACK AT BEDTIME FOR INSOMNIA 30 Tab 1    clonazePAM (KLONOPIN) 1 mg tablet Take 1 Tab by mouth nightly. Max Daily Amount: 1 mg. 30 Tab 2    LORazepam (ATIVAN) 1 mg tablet Take 1 tablet twice daily as needed for panic attacks and anxiety 60 Tab 1    albuterol (PROVENTIL HFA, VENTOLIN HFA, PROAIR HFA) 90 mcg/actuation inhaler Take  by inhalation.  albuterol (PROVENTIL HFA, VENTOLIN HFA, PROAIR HFA) 90 mcg/actuation inhaler Take 2 Puffs by inhalation every four (4) hours as needed for Wheezing.  1 Inhaler 0    albuterol (PROVENTIL VENTOLIN) 2.5 mg /3 mL (0.083 %) nebulizer solution 3 mL by Nebulization route every four (4) hours as needed for Wheezing. 24 Each 0    ketorolac (TORADOL) 10 mg tablet Take 1 Tab by mouth every six (6) hours as needed for Pain. 20 Tab 0          Problems addressed today:    ICD-10-CM ICD-9-CM    1. 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 Insomnia disorder with non-sleep disorder mental comorbidity G47.00 780.52    3. Other mixed anxiety disorders F41.3 300.09        Orders Placed This Encounter    buPROPion XL (WELLBUTRIN XL) 300 mg XL tablet     Sig: Take 1 Tab by mouth every morning. Dispense:  90 Tab     Refill:  1       Assessment:   Lisa Leija  is a 34 y.o.  female  is responding to treatment in respect to anxiety and insomnia but is developing depressive symptoms with SI. She finds herself less motivated and more hopeless. Patient denies SI/HI/SIB. No evidence of AH/VH or delusions. Risk Scoring- chronic illnesses and prescription drug management    Treatment PlanTreatment plan reviewed with patient-including diagnosis and medications:           Medication Changes/Adjustments: Discussed increasing Wellbutrin to 300mg XL. Has ample supply of the Klonopin and Ativan, provided forms for controlled substance agreement. Current Outpatient Medications   Medication Sig Dispense Refill    buPROPion XL (WELLBUTRIN XL) 300 mg XL tablet Take 1 Tab by mouth every morning. 90 Tab 1    traZODone (DESYREL) 50 mg tablet TAKE 1/2 TO 1 TABLET AS TOLERATED WITH A SNACK AT BEDTIME FOR INSOMNIA 30 Tab 1    clonazePAM (KLONOPIN) 1 mg tablet Take 1 Tab by mouth nightly. Max Daily Amount: 1 mg. 30 Tab 2    LORazepam (ATIVAN) 1 mg tablet Take 1 tablet twice daily as needed for panic attacks and anxiety 60 Tab 1    albuterol (PROVENTIL HFA, VENTOLIN HFA, PROAIR HFA) 90 mcg/actuation inhaler Take  by inhalation.       albuterol (PROVENTIL HFA, VENTOLIN HFA, PROAIR HFA) 90 mcg/actuation inhaler Take 2 Puffs by inhalation every four (4) hours as needed for Wheezing. 1 Inhaler 0    albuterol (PROVENTIL VENTOLIN) 2.5 mg /3 mL (0.083 %) nebulizer solution 3 mL by Nebulization route every four (4) hours as needed for Wheezing. 24 Each 0    ketorolac (TORADOL) 10 mg tablet Take 1 Tab by mouth every six (6) hours as needed for Pain. 20 Tab 0                  The following regarding medications was addressed:    (The risks, benefits of the proposed medication and the potential medication side effects ie dry mouth, weight gain, GI upset, headache). The patient was given the opportunity to ask questions. The patient was informed of the consequences of refusing medications and non-compliance. 2.  Counseling and coordination of care including instructions for treatment, risks/benefits, risk factor reduction and patient/family education. She agrees with the plan. 3.Patient instructed to call with any side effects, questions or issues. PSYCHOTHERAPY:  approx 20 minutes  Supportive/Cognitive Behavioral/Solution Focused psychotherapy provided: Explored the death of her grandmother ( whom she looked up to)and how she handled distress and frustrations. Challenged irrational thoughts with logical,practical ones. Hope instilled. She was receptive. Homework given regarding: none  Psychoeducation with handouts provided:  None     Ms. Thuan Francisco has a reminder for a \"due or due soon\" health maintenance. I have asked that she contact her primary care provider for follow-up on this health maintenance. Tequila Doss is progressing anxiety wise, not as anxious or panicky, utilizing relaxation techniques but is more on the depressive side. Follow-up Disposition:  Return in about 2 months (around 2/7/2019) for worsening symptoms.       Shaheed Gonzalez MD  12/7/2018

## 2018-12-07 NOTE — PROGRESS NOTES
[facility] [place] [facility] [place] [address] [address], [address] W: [phone]  F: [phone] for Visit:  
Magdy Boss is a 34 y.o. female who is seen in consultation at the request of Jasmin Snowden NP for evaluation of abnormal CBC History of Present Illness:  
Patient is a 34 y.o. female PMH as reviewed below who is seen for evaluation of abnormal CBC. She is noted to have new leucocytosis since 18 with a WBC count of 14-17k with neutrophilia. Also has had chronic thrombocytosis that has ranged from 414k to 503k. No anemia. She feels well. She has no fevers, chills, sweats. Has had occasional episodes of dizziness- is on multiple medications for anxiety. She has had no new lumps or bumps. Has had no appetite changes, has gained 20 lb since May 2018 since her cholecystectomy. Generally has no abdominal pain but had one episode of diarrhea yesterday. No hematuria, has no dysuria. She has no discharge, not pregnant. Has noted some \" white blotches on her skin\" She has no H/O Blood clots. She has menorrhagia Smoked since . She quit in  Mother had a DVT Grandmother  of a PE in her 25s Past Medical History:  
Diagnosis Date  Asthma  Bipolar disorder (manic depression) ([facility] [address]   Community acquired pneumonia  GERD (gastroesophageal reflux disease)  Headache(784.0) Migraine  Heart murmur 1993  Lung nodule  Neurological disorder   
 migraines  Second hand smoke exposure Past Surgical History:  
Procedure Laterality Date  HX CHOLECYSTECTOMY  HX HEENT    
 wisdom teeth extraction  HX HEENT    
 BMWT Social History Tobacco Use  Smoking status: Former Smoker Packs/day: 1.00 Years: 5.00 Pack years: 5.00 Last attempt to quit: 2017 Years since quittin.3  Smokeless tobacco: Never Used Substance Use Topics  Alcohol use: No  
  
Family History Problem Relation Age of Onset  Diabetes Father  Heart Disease Father  Hypertension Father  Anxiety Father  COPD Father  Diabetes Mother  Hypertension Mother  Anxiety Mother  COPD Mother  Diabetes Paternal Grandmother Current Outpatient Medications Medication Sig  
 buPROPion XL (WELLBUTRIN XL) 300 mg XL tablet Take 1 Tab by mouth every morning.  traZODone (DESYREL) 50 mg tablet TAKE 1/2 TO 1 TABLET AS TOLERATED WITH A SNACK AT BEDTIME FOR INSOMNIA  clonazePAM (KLONOPIN) 1 mg tablet Take 1 Tab by mouth nightly. Max Daily Amount: 1 mg.  LORazepam (ATIVAN) 1 mg tablet Take 1 tablet twice daily as needed for panic attacks and anxiety  albuterol (PROVENTIL HFA, VENTOLIN HFA, PROAIR HFA) 90 mcg/actuation inhaler Take  by inhalation.  ketorolac (TORADOL) 10 mg tablet Take 1 Tab by mouth every six (6) hours as needed for Pain.  albuterol (PROVENTIL HFA, VENTOLIN HFA, PROAIR HFA) 90 mcg/actuation inhaler Take 2 Puffs by inhalation every four (4) hours as needed for Wheezing.  albuterol (PROVENTIL VENTOLIN) 2.5 mg /3 mL (0.083 %) nebulizer solution 3 mL by Nebulization route every four (4) hours as needed for Wheezing. No current facility-administered medications for this visit. No Known Allergies Review of Systems: A complete review of systems was obtained, negative except as described above. Physical Exam:  
 
Visit Vitals LMP 11/09/2018 ECOG PS: 1 General: No distress Eyes: PERRLA, anicteric sclerae HENT: Atraumatic, OP clear Neck: Supple Lymphatic: No cervical, supraclavicular, or inguinal adenopathy Respiratory: CTAB, normal respiratory effort CV: Normal rate, regular rhythm, no murmurs, no peripheral edema GI: Soft, nontender, nondistended, no masses, no hepatomegaly, no splenomegaly MS: Normal gait and station. Digits without clubbing or cyanosis. Skin: No rashes, has had some vitiligo like lesions on her arms Psych: Alert, oriented, appropriate affect, normal judgment/insight Results:  
 
Lab Results Component Value Date/Time WBC 14.1 (H) 08/28/2018 07:00 PM  
 HGB 12.7 08/28/2018 07:00 PM  
 HCT 37.6 08/28/2018 07:00 PM  
 PLATELET 358 (H) 29/11/6404 07:00 PM  
 MCV 90.8 08/28/2018 07:00 PM  
 ABS. NEUTROPHILS 9.4 (H) 08/28/2018 07:00 PM  
 
Lab Results Component Value Date/Time Sodium 137 08/28/2018 07:00 PM  
 Potassium 3.5 08/28/2018 07:00 PM  
 Chloride 105 08/28/2018 07:00 PM  
 CO2 24 08/28/2018 07:00 PM  
 Glucose 97 08/28/2018 07:00 PM  
 BUN 17 08/28/2018 07:00 PM  
 Creatinine 0.75 08/28/2018 07:00 PM  
 GFR est AA >60 08/28/2018 07:00 PM  
 GFR est non-AA >60 08/28/2018 07:00 PM  
 Calcium 9.0 08/28/2018 07:00 PM  
 Glucose (POC) 91 08/20/2018 05:57 PM  
 
Lab Results Component Value Date/Time Bilirubin, total <0.1 (L) 08/28/2018 07:00 PM  
 ALT (SGPT) 18 08/28/2018 07:00 PM  
 AST (SGOT) 16 08/28/2018 07:00 PM  
 Alk. phosphatase 115 08/28/2018 07:00 PM  
 Protein, total 8.2 08/28/2018 07:00 PM  
 Albumin 3.9 08/28/2018 07:00 PM  
 Globulin 4.3 (H) 08/28/2018 07:00 PM  
 
 
Lab Results Component Value Date/Time Sed rate (ESR) 17 08/21/2018 09:07 AM  
 C-Reactive Protein, Qt 8.9 (H) 08/21/2018 09:07 AM  
 TSH 0.966 08/21/2018 09:07 AM  
 Lipase 136 05/20/2018 07:29 PM  
 
Lab Results Component Value Date/Time INR 1.0 08/20/2018 05:48 PM  
 D-dimer 0.25 08/28/2018 07:28 PM  
 
 
Records reviewed and summarized above. Pathology report(s) reviewed above. Radiology report(s) reviewed USG limited 5/18 showed GB studge Assessment:  
1) Leucocytosis EMR reviewed New since 8/2018. Predominantly neutrophilia Reviewed causes such as infection, inflammation, BM disorders 2) Thrombocytosis Chronic and mild Reactive vs Autonomous Will proceed with further work up 3) Bipolar disorder On Bupropion 4) Obesity 5) Hypopigmented rashes Consider Dermatology evaluation Plan: · Cbc diff, smear, iron profile, LD, USG abdomen, RADHA 2 , BCR ABL 
 
RTC 4 - 5 weeks I appreciate the opportunity to participate in [address] [facility].  
 
Signed By: Nemesio Young MD

## 2018-12-07 NOTE — PROGRESS NOTES
Maria Espinoza is a 34 y.o. female here today as a new patient, elevated WBC. 1. Have you been to the ER, urgent care clinic since your last visit? Hospitalized since your last visit? Was in ER at [facility] in August, chest pain/SOB 2. Have you seen or consulted any other health care providers outside of the [address] [place] [address] since your last visit? Include any pap smears or colon screening.  No

## 2018-12-17 ENCOUNTER — HOSPITAL ENCOUNTER (OUTPATIENT)
Dept: INFUSION THERAPY | Age: 28
Discharge: HOME | End: 2018-12-17
Payer: SUBSIDIZED

## 2018-12-17 LAB
ALBUMIN SERPL-MCNC: 3.6 G/DL (ref 3.5–5)
ALBUMIN/GLOB SERPL: 1 {RATIO} (ref 1.1–2.2)
ALP SERPL-CCNC: 123 U/L (ref 45–117)
ALT SERPL-CCNC: 30 U/L (ref 12–78)
ANION GAP SERPL CALC-SCNC: 6 MMOL/L (ref 5–15)
AST SERPL-CCNC: 22 U/L (ref 15–37)
BASOPHILS # BLD: 0.1 K/UL (ref 0–0.1)
BASOPHILS NFR BLD: 1 % (ref 0–1)
BILIRUB SERPL-MCNC: 0.1 MG/DL (ref 0.2–1)
BUN SERPL-MCNC: 16 MG/DL (ref 6–20)
BUN/CREAT SERPL: 24 (ref 12–20)
CALCIUM SERPL-MCNC: 9 MG/DL (ref 8.5–10.1)
CHLORIDE SERPL-SCNC: 109 MMOL/L (ref 97–108)
CO2 SERPL-SCNC: 24 MMOL/L (ref 21–32)
CREAT SERPL-MCNC: 0.66 MG/DL (ref 0.55–1.02)
DIFFERENTIAL METHOD BLD: ABNORMAL
EOSINOPHIL # BLD: 0.2 K/UL (ref 0–0.4)
EOSINOPHIL NFR BLD: 2 % (ref 0–7)
ERYTHROCYTE [DISTWIDTH] IN BLOOD BY AUTOMATED COUNT: 11.8 % (ref 11.5–14.5)
FERRITIN SERPL-MCNC: 33 NG/ML (ref 8–252)
GLOBULIN SER CALC-MCNC: 3.7 G/DL (ref 2–4)
GLUCOSE SERPL-MCNC: 94 MG/DL (ref 65–100)
HCT VFR BLD AUTO: 38.6 % (ref 35–47)
HGB BLD-MCNC: 12.6 G/DL (ref 11.5–16)
IMM GRANULOCYTES # BLD: 0 K/UL (ref 0–0.04)
IMM GRANULOCYTES NFR BLD AUTO: 0 % (ref 0–0.5)
IRON SATN MFR SERPL: 19 % (ref 20–50)
IRON SERPL-MCNC: 57 UG/DL (ref 35–150)
LDH SERPL L TO P-CCNC: 196 U/L (ref 81–246)
LYMPHOCYTES # BLD: 3.2 K/UL (ref 0.8–3.5)
LYMPHOCYTES NFR BLD: 29 % (ref 12–49)
MCH RBC QN AUTO: 30.3 PG (ref 26–34)
MCHC RBC AUTO-ENTMCNC: 32.6 G/DL (ref 30–36.5)
MCV RBC AUTO: 92.8 FL (ref 80–99)
MONOCYTES # BLD: 0.6 K/UL (ref 0–1)
MONOCYTES NFR BLD: 6 % (ref 5–13)
NEUTS SEG # BLD: 6.8 K/UL (ref 1.8–8)
NEUTS SEG NFR BLD: 62 % (ref 32–75)
NRBC # BLD: 0 K/UL (ref 0–0.01)
NRBC BLD-RTO: 0 PER 100 WBC
PLATELET # BLD AUTO: 405 K/UL (ref 150–400)
PMV BLD AUTO: 9.4 FL (ref 8.9–12.9)
POTASSIUM SERPL-SCNC: 4 MMOL/L (ref 3.5–5.1)
PROT SERPL-MCNC: 7.3 G/DL (ref 6.4–8.2)
RBC # BLD AUTO: 4.16 M/UL (ref 3.8–5.2)
SODIUM SERPL-SCNC: 139 MMOL/L (ref 136–145)
TIBC SERPL-MCNC: 299 UG/DL (ref 250–450)
WBC # BLD AUTO: 10.9 K/UL (ref 3.6–11)

## 2018-12-17 PROCEDURE — 81270 JAK2 GENE: CPT

## 2018-12-17 PROCEDURE — 85025 COMPLETE CBC W/AUTO DIFF WBC: CPT

## 2018-12-17 PROCEDURE — 36415 COLL VENOUS BLD VENIPUNCTURE: CPT

## 2018-12-17 PROCEDURE — 81206 BCR/ABL1 GENE MAJOR BP: CPT

## 2018-12-17 PROCEDURE — 83615 LACTATE (LD) (LDH) ENZYME: CPT

## 2018-12-17 PROCEDURE — 83540 ASSAY OF IRON: CPT

## 2018-12-17 PROCEDURE — 80053 COMPREHEN METABOLIC PANEL: CPT

## 2018-12-17 PROCEDURE — 82728 ASSAY OF FERRITIN: CPT

## 2018-12-19 ENCOUNTER — HOSPITAL ENCOUNTER (OUTPATIENT)
Dept: ULTRASOUND IMAGING | Age: 28
Discharge: HOME | End: 2018-12-19
Attending: INTERNAL MEDICINE
Payer: SUBSIDIZED

## 2018-12-19 DIAGNOSIS — D75.839 THROMBOCYTOSIS: ICD-10-CM

## 2018-12-19 PROCEDURE — 76700 US EXAM ABDOM COMPLETE: CPT

## 2018-12-21 DIAGNOSIS — F41.8 ANXIETY WITH LIMITED-SYMPTOM ATTACKS: ICD-10-CM

## 2018-12-21 LAB
BACKGROUND: 489207: NORMAL
DIRECTOR REVIEW: 489204: NORMAL
JAK2 P.V617F BLD/T QL: NORMAL

## 2018-12-21 RX ORDER — LORAZEPAM 1 MG/1
TABLET ORAL
Qty: 60 TAB | Refills: 1 | Status: SHIPPED | OUTPATIENT
Start: 2018-12-21 | End: 2018-12-21 | Stop reason: SDUPTHER

## 2018-12-24 RX ORDER — LORAZEPAM 1 MG/1
TABLET ORAL
Qty: 60 TAB | Refills: 1 | Status: SHIPPED | OUTPATIENT
Start: 2018-12-24 | End: 2019-01-22 | Stop reason: SDUPTHER

## 2018-12-24 RX ORDER — LORAZEPAM 1 MG/1
TABLET ORAL
Qty: 60 TAB | Refills: 0 | Status: SHIPPED | OUTPATIENT
Start: 2018-12-24

## 2018-12-26 LAB
BACKGROUND, BCR6T: NORMAL
INTERPRETATION: 480488: NORMAL
LAB DIRECTOR NAME PROVIDER: NORMAL
T(ABL1,BCR)B2A2/CONTROL BLD/T: NORMAL %
T(ABL1,BCR)B2A2/CONTROL BLD/T: NORMAL %
T(ABL1,BCR)B3A2/CONTROL BLD/T: NORMAL %
T(ABL1,BCR)E1A2/CONTROL BLD/T: NORMAL %

## 2019-01-22 ENCOUNTER — OFFICE VISIT (OUTPATIENT)
Dept: ONCOLOGY | Age: 29
End: 2019-01-22

## 2019-01-22 VITALS
OXYGEN SATURATION: 98 % | WEIGHT: 180.4 LBS | HEART RATE: 70 BPM | SYSTOLIC BLOOD PRESSURE: 105 MMHG | BODY MASS INDEX: 34.06 KG/M2 | RESPIRATION RATE: 20 BRPM | TEMPERATURE: 98.9 F | HEIGHT: 61 IN | DIASTOLIC BLOOD PRESSURE: 68 MMHG

## 2019-01-22 DIAGNOSIS — F32.89 MINOR DEPRESSIVE DISORDER: ICD-10-CM

## 2019-01-22 DIAGNOSIS — F41.8 ANXIETY WITH LIMITED-SYMPTOM ATTACKS: ICD-10-CM

## 2019-01-22 DIAGNOSIS — D75.839 THROMBOCYTOSIS: Primary | ICD-10-CM

## 2019-01-22 RX ORDER — LORAZEPAM 1 MG/1
TABLET ORAL
Qty: 60 TAB | Refills: 1 | Status: SHIPPED | OUTPATIENT
Start: 2019-01-22 | End: 2019-02-19 | Stop reason: SDUPTHER

## 2019-01-22 NOTE — PROGRESS NOTES
[facility] [place] at [place] [address] [address], [address]  W: [phone]  F: [phone]    Reason for Visit:   Ginny Paniagua is a 34 y.o. female who is seen for abnormal CBC     History of Present Illness:   Patient is a 34 y.o. female PMH as reviewed below who is seen for evaluation of abnormal CBC. She is noted to have new leucocytosis since 18 with a WBC count of 14-17k with neutrophilia. Also has had chronic thrombocytosis that has ranged from 414k to 503k. No anemia. Had additional work up and comes to discuss results. She feels well. She has no fevers, chills, sweats. No hematuria, has no dysuria. She has no discharge, not pregnant. Comes with her mother  Smoked since . She quit in     Mother had a DVT . She has had a h/o Thrombocytosis.   Grandmother  of a PE in her 25s    Past Medical History:   Diagnosis Date    Asthma     B[facility] disorder (manic depression) ([facility] [address]     Community acquired pneumonia     GERD (gastroesophageal reflux disease)     Headache(784.0)     Migraine    Heart murmur 1993    Lung nodule     Neurological disorder     migraines    Second hand smoke exposure       Past Surgical History:   Procedure Laterality Date    HX CHOLECYSTECTOMY      HX HEENT      wisdom teeth extraction    HX HEENT      BMWT      Social History     Tobacco Use    Smoking status: Former Smoker     Packs/day: 1.00     Years: 5.00     Pack years: 5.00     Last attempt to quit: 2017     Years since quittin.5    Smokeless tobacco: Never Used   Substance Use Topics    Alcohol use: No      Family History   Problem Relation Age of Onset    Diabetes Father     Heart Disease Father     Hypertension Father     Anxiety Father     COPD Father     Diabetes Mother     Hypertension Mother     Anxiety Mother     COPD Mother     Diabetes Paternal Grandmother      Current Outpatient Medications   Medication Sig    LORazepam (ATIVAN) 1 mg tablet TAKE 1 TABLET BY MOUTH TWICE A DAY AS NEEDED PANIC/ANXIETY    LORazepam (ATIVAN) 1 mg tablet TAKE 1 TABLET BY MOUTH TWICE A DAY AS NEEDED FOR ANXIETY OR PANIC ATTACKS    traZODone (DESYREL) 50 mg tablet TAKE 1/2 TO 1 TABLET AS TOLERATED WITH A SNACK AT BEDTIME FOR INSOMNIA    clonazePAM (KLONOPIN) 1 mg tablet Take 1 Tab by mouth nightly. Max Daily Amount: 1 mg.  albuterol (PROVENTIL HFA, VENTOLIN HFA, PROAIR HFA) 90 mcg/actuation inhaler Take  by inhalation.  albuterol (PROVENTIL HFA, VENTOLIN HFA, PROAIR HFA) 90 mcg/actuation inhaler Take 2 Puffs by inhalation every four (4) hours as needed for Wheezing.  buPROPion XL (WELLBUTRIN XL) 300 mg XL tablet Take 1 Tab by mouth every morning.  ketorolac (TORADOL) 10 mg tablet Take 1 Tab by mouth every six (6) hours as needed for Pain.  albuterol (PROVENTIL VENTOLIN) 2.5 mg /3 mL (0.083 %) nebulizer solution 3 mL by Nebulization route every four (4) hours as needed for Wheezing. No current facility-administered medications for this visit. No Known Allergies     Review of Systems: A complete review of systems was obtained, negative except as described above. Physical Exam:     Visit Vitals  /68 (BP 1 Location: Right arm, BP Patient Position: Sitting)   Pulse 70   Temp 98.9 °F (37.2 °C) (Oral)   Resp 20   Ht 5' 1\" (1.549 m)   Wt 180 lb 6.4 oz (81.8 kg)   LMP 01/08/2019   SpO2 98%   BMI 34.09 kg/m²     ECOG PS: 1  General: No distress  Eyes: PERRLA, anicteric sclerae  HENT: Atraumatic, OP clear  GI: Soft, nontender, nondistended, no masses, no hepatomegaly, no splenomegaly  MS: Normal gait and station. Digits without clubbing or cyanosis.   Skin: No rashes, has had some vitiligo like lesions on her arms  Psych: Alert, oriented, appropriate affect, normal judgment/insight    Results:     Lab Results   Component Value Date/Time    WBC 10.9 12/17/2018 03:35 PM    HGB 12.6 12/17/2018 03:35 PM    HCT 38.6 12/17/2018 03:35 PM PLATELET 520 (H) 50/88/3353 03:35 PM    MCV 92.8 12/17/2018 03:35 PM    ABS. NEUTROPHILS 6.8 12/17/2018 03:35 PM     Lab Results   Component Value Date/Time    Sodium 139 12/17/2018 03:35 PM    Potassium 4.0 12/17/2018 03:35 PM    Chloride 109 (H) 12/17/2018 03:35 PM    CO2 24 12/17/2018 03:35 PM    Glucose 94 12/17/2018 03:35 PM    BUN 16 12/17/2018 03:35 PM    Creatinine 0.66 12/17/2018 03:35 PM    GFR est AA >60 12/17/2018 03:35 PM    GFR est non-AA >60 12/17/2018 03:35 PM    Calcium 9.0 12/17/2018 03:35 PM    Glucose (POC) 91 08/20/2018 05:57 PM     Lab Results   Component Value Date/Time    Bilirubin, total 0.1 (L) 12/17/2018 03:35 PM    ALT (SGPT) 30 12/17/2018 03:35 PM    AST (SGOT) 22 12/17/2018 03:35 PM    Alk. phosphatase 123 (H) 12/17/2018 03:35 PM    Protein, total 7.3 12/17/2018 03:35 PM    Albumin 3.6 12/17/2018 03:35 PM    Globulin 3.7 12/17/2018 03:35 PM       Lab Results   Component Value Date/Time    Iron % saturation 19 (L) 12/17/2018 03:35 PM    TIBC 299 12/17/2018 03:35 PM    Ferritin 33 12/17/2018 03:35 PM     12/17/2018 03:35 PM    Sed rate (ESR) 17 08/21/2018 09:07 AM    C-Reactive Protein, Qt 8.9 (H) 08/21/2018 09:07 AM    TSH 0.966 08/21/2018 09:07 AM    Lipase 136 05/20/2018 07:29 PM     Lab Results   Component Value Date/Time    INR 1.0 08/20/2018 05:48 PM    D-dimer 0.25 08/28/2018 07:28 PM     Smear reviewed    Records reviewed and summarized above. Pathology report(s) reviewed above. Radiology report(s) reviewed     USG limited 5/18 showed GB studge    Assessment:   1) Leucocytosis    EMR reviewed  New since 8/2018.   Predominantly neutrophilia    This has since resolved and is not suggestive of a BM disorder      2) Thrombocytosis    Chronic and mild- continues to improve  JAK2 and BCR-ABL NEGATIVE  No splenomegaly    Likely this is reactive   Will follow   They declined a BM bx and that is reasonable for now    3) Bipolar disorder  On Bupropion    4) Obesity    Plan: Cbc diff and return in 6 months      I appreciate the opportunity to participate in [address] [facility] care.     Signed By: Carlos Marie MD

## 2019-01-22 NOTE — PROGRESS NOTES
Jason Ramsey is a 34 y.o. female here today for follow up, thrombocytosis. 1. Have you been to the ER, urgent care clinic since your last visit? Hospitalized since your last visit? No     2. Have you seen or consulted any other health care providers outside of the [address] [place] [address] since your last visit? Include any pap smears or colon screening.  No

## 2019-02-12 DIAGNOSIS — G47.00 INSOMNIA DISORDER WITH NON-SLEEP DISORDER MENTAL COMORBIDITY: ICD-10-CM

## 2019-02-12 RX ORDER — TRAZODONE HYDROCHLORIDE 50 MG/1
TABLET ORAL
Qty: 30 TAB | Refills: 1 | Status: SHIPPED | OUTPATIENT
Start: 2019-02-12 | End: 2019-07-26

## 2019-02-13 DIAGNOSIS — F41.3 OTHER MIXED ANXIETY DISORDERS: ICD-10-CM

## 2019-02-13 DIAGNOSIS — G47.00 INSOMNIA DISORDER WITH NON-SLEEP DISORDER MENTAL COMORBIDITY: ICD-10-CM

## 2019-02-13 RX ORDER — CLONAZEPAM 1 MG/1
TABLET ORAL
Qty: 30 TAB | Refills: 2 | Status: SHIPPED | OUTPATIENT
Start: 2019-02-13 | End: 2020-02-22

## 2019-02-18 DIAGNOSIS — F41.8 ANXIETY WITH LIMITED-SYMPTOM ATTACKS: ICD-10-CM

## 2019-02-19 DIAGNOSIS — F41.8 ANXIETY WITH LIMITED-SYMPTOM ATTACKS: ICD-10-CM

## 2019-02-20 RX ORDER — LORAZEPAM 1 MG/1
TABLET ORAL
Qty: 60 TAB | Refills: 0 | Status: SHIPPED | OUTPATIENT
Start: 2019-02-20 | End: 2019-07-26

## 2019-02-20 RX ORDER — LORAZEPAM 1 MG/1
TABLET ORAL
Qty: 60 TAB | Refills: 1 | Status: SHIPPED | OUTPATIENT
Start: 2019-02-20 | End: 2019-07-26

## 2019-05-14 ENCOUNTER — HOSPITAL ENCOUNTER (OUTPATIENT)
Dept: GENERAL RADIOLOGY | Age: 29
Discharge: HOME | End: 2019-05-14
Payer: COMMERCIAL

## 2019-05-14 DIAGNOSIS — J45.909 ASTHMA: ICD-10-CM

## 2019-05-14 PROCEDURE — 71046 X-RAY EXAM CHEST 2 VIEWS: CPT

## 2019-06-29 ENCOUNTER — HOSPITAL ENCOUNTER (EMERGENCY)
Age: 29
Discharge: HOME | End: 2019-06-29
Attending: EMERGENCY MEDICINE
Payer: COMMERCIAL

## 2019-06-29 VITALS
TEMPERATURE: 98.3 F | RESPIRATION RATE: 16 BRPM | BODY MASS INDEX: 36.91 KG/M2 | DIASTOLIC BLOOD PRESSURE: 81 MMHG | WEIGHT: 195.33 LBS | SYSTOLIC BLOOD PRESSURE: 95 MMHG | OXYGEN SATURATION: 100 % | HEART RATE: 82 BPM

## 2019-06-29 DIAGNOSIS — M25.562 CHRONIC PAIN OF LEFT KNEE: Primary | ICD-10-CM

## 2019-06-29 DIAGNOSIS — G89.29 CHRONIC PAIN OF LEFT KNEE: Primary | ICD-10-CM

## 2019-06-29 PROCEDURE — 99282 EMERGENCY DEPT VISIT SF MDM: CPT

## 2019-06-29 RX ORDER — DIVALPROEX SODIUM 500 MG/1
TABLET, DELAYED RELEASE ORAL 3 TIMES DAILY
COMMUNITY
End: 2022-07-19

## 2019-06-29 RX ORDER — MELOXICAM 7.5 MG/1
7.5 TABLET ORAL DAILY
Qty: 20 TAB | Refills: 0 | Status: SHIPPED | OUTPATIENT
Start: 2019-06-29 | End: 2019-07-26

## 2019-06-29 RX ORDER — BUDESONIDE AND FORMOTEROL FUMARATE DIHYDRATE 160; 4.5 UG/1; UG/1
2 AEROSOL RESPIRATORY (INHALATION) 2 TIMES DAILY
COMMUNITY

## 2019-06-29 RX ORDER — RISPERIDONE 0.5 MG/1
TABLET, FILM COATED ORAL
COMMUNITY
End: 2020-02-22

## 2019-06-29 RX ORDER — CAPSAICIN 0.03 G/100G
CREAM TOPICAL
Qty: 60 G | Refills: 0 | Status: SHIPPED | OUTPATIENT
Start: 2019-06-29 | End: 2020-02-22

## 2019-06-29 NOTE — ED TRIAGE NOTES
Triage Note: Patient arrives to ER complaining of left knee pain x a few months, worsening recently. Denies recent injury, states she injured it 15 years ago. Occasional swelling. Tried several medications by PCP, without relief, took tylenol and ibuprofen this morning. Also wants to have a rash on her tighs checked out. Raised red bumps noted to her inner thighs.

## 2019-06-29 NOTE — ED PROVIDER NOTES
The history is provided by the patient. Knee Pain    This is a chronic problem. Episode onset: several months. The problem occurs constantly. The problem has not changed since onset. The pain is present in the left knee. The quality of the pain is described as aching (throbbing ). The pain is moderate. Associated symptoms include stiffness. Treatments tried: NSAIDs and tylenol. There has been no history of extremity trauma.         Past Medical History:   Diagnosis Date    Asthma     Bipolar disorder (manic depression) ([facility] [address]     Community acquired pneumonia     GERD (gastroesophageal reflux disease)     Headache(784.0)     Migraine    Heart murmur 1993    Lung nodule     Neurological disorder     migraines    Second hand smoke exposure        Past Surgical History:   Procedure Laterality Date    HX CHOLECYSTECTOMY      HX HEENT      wisdom teeth extraction    HX HEENT      BMWT         Family History:   Problem Relation Age of Onset    Diabetes Father     Heart Disease Father     Hypertension Father     Anxiety Father     COPD Father     Diabetes Mother     Hypertension Mother     Anxiety Mother     COPD Mother     Diabetes Paternal Grandmother        Social History     Socioeconomic History    Marital status: SINGLE     Spouse name: Not on file    Number of children: Not on file    Years of education: Not on file    Highest education level: Not on file   Occupational History    Not on file   Social Needs    Financial resource strain: Not on file    Food insecurity:     Worry: Not on file     Inability: Not on file    Transportation needs:     Medical: Not on file     Non-medical: Not on file   Tobacco Use    Smoking status: Former Smoker     Packs/day: 1.00     Years: 5.00     Pack years: 5.00     Last attempt to quit: 2017     Years since quittin.9    Smokeless tobacco: Never Used   Substance and Sexual Activity    Alcohol use: No    Drug use: No     Frequency: 1.0 times per week     Comment: denies any marijuana    Sexual activity: Yes     Partners: Male     Birth control/protection: Condom   Lifestyle    Physical activity:     Days per week: Not on file     Minutes per session: Not on file    Stress: Not on file   Relationships    Social connections:     Talks on phone: Not on file     Gets together: Not on file     Attends Christian service: Not on file     Active member of club or organization: Not on file     Attends meetings of clubs or organizations: Not on file     Relationship status: Not on file    Intimate partner violence:     Fear of current or ex partner: Not on file     Emotionally abused: Not on file     Physically abused: Not on file     Forced sexual activity: Not on file   Other Topics Concern    Not on file   Social History Narrative    ** Merged History Encounter **              ALLERGIES: Diclofenac    Review of Systems   Musculoskeletal: Positive for stiffness. All other systems reviewed and are negative. Vitals:    06/29/19 1535   BP: 135/70   Pulse: 90   Resp: 16   Temp: 98.3 °F (36.8 °C)   SpO2: 100%   Weight: 88.6 kg (195 lb 5.2 oz)            Physical Exam   Constitutional: She appears well-developed and well-nourished. No distress. HENT:   Head: Normocephalic and atraumatic. Eyes: Conjunctivae are normal.   Neck: Neck supple. Cardiovascular: Normal rate and regular rhythm. Pulmonary/Chest: Effort normal. No respiratory distress. Abdominal: She exhibits no distension. Musculoskeletal: Normal range of motion. She exhibits no deformity. Left knee: She exhibits effusion (tiny). She exhibits normal range of motion, no swelling, no deformity, normal alignment, no LCL laxity, normal patellar mobility, no bony tenderness, normal meniscus and no MCL laxity. Tenderness found. MCL and patellar tendon (superior to patella) tenderness noted. No medial joint line, no lateral joint line and no LCL tenderness noted.    Neurological: She is alert. No cranial nerve deficit. Skin: Skin is warm and dry. Psychiatric: Her behavior is normal.   Nursing note and vitals reviewed. MDM     34 y.o. female presents with chronic left knee pain for several months on and off. No instability or significant abnormality of knee on exam. Has been refractory to mobic and tylenol therapy so will attempt topical capsaicin as she has a voltaren allergy she endorses. I suggested switching from treadmill to exercise bike or elliptical to avoid impact and stress to joint. She has already had negative imaging for same problem so not worthwhile today. Plan to follow up with PCP as needed and return precautions discussed for worsening or new concerning symptoms.       Procedures

## 2019-06-29 NOTE — ED NOTES
Patient was discharged and given instructions by Dr. Teo Rodriguez. Patient verbalized good understanding of all discharge instructions, prescriptions and f/u care. All questions answered. Pt in stable condition on discharge.

## 2019-07-22 DIAGNOSIS — D75.839 THROMBOCYTOSIS: ICD-10-CM

## 2019-07-26 ENCOUNTER — OFFICE VISIT (OUTPATIENT)
Dept: URGENT CARE | Age: 29
End: 2019-07-26

## 2019-07-26 VITALS
BODY MASS INDEX: 37 KG/M2 | SYSTOLIC BLOOD PRESSURE: 132 MMHG | WEIGHT: 196 LBS | RESPIRATION RATE: 20 BRPM | HEART RATE: 80 BPM | HEIGHT: 61 IN | DIASTOLIC BLOOD PRESSURE: 80 MMHG | OXYGEN SATURATION: 99 % | TEMPERATURE: 98.4 F

## 2019-07-26 DIAGNOSIS — M54.16 LUMBAR BACK PAIN WITH RADICULOPATHY AFFECTING RIGHT LOWER EXTREMITY: Primary | ICD-10-CM

## 2019-07-26 RX ORDER — METHOCARBAMOL 500 MG/1
500 TABLET, FILM COATED ORAL 4 TIMES DAILY
Qty: 12 TAB | Refills: 0 | Status: SHIPPED | OUTPATIENT
Start: 2019-07-26 | End: 2020-02-22

## 2019-07-26 RX ORDER — PREDNISONE 10 MG/1
TABLET ORAL
Qty: 21 TAB | Refills: 0 | Status: SHIPPED | OUTPATIENT
Start: 2019-07-26 | End: 2020-02-22

## 2019-07-26 NOTE — PATIENT INSTRUCTIONS
Rest and seek medical care for increased problems, any questions or concern including but not limited to the ones discussed with you here today. Back Pain: Care Instructions  Your Care Instructions    Back pain has many possible causes. It is often related to problems with muscles and ligaments of the back. It may also be related to problems with the nerves, discs, or bones of the back. Moving, lifting, standing, sitting, or sleeping in an awkward way can strain the back. Sometimes you don't notice the injury until later. Arthritis is another common cause of back pain. Although it may hurt a lot, back pain usually improves on its own within several weeks. Most people recover in 12 weeks or less. Using good home treatment and being careful not to stress your back can help you feel better sooner. Follow-up care is a key part of your treatment and safety. Be sure to make and go to all appointments, and call your doctor if you are having problems. It's also a good idea to know your test results and keep a list of the medicines you take. How can you care for yourself at home? · Sit or lie in positions that are most comfortable and reduce your pain. Try one of these positions when you lie down:  ? Lie on your back with your knees bent and supported by large pillows. ? Lie on the floor with your legs on the seat of a sofa or chair. ? Lie on your side with your knees and hips bent and a pillow between your legs. ? Lie on your stomach if it does not make pain worse. · Do not sit up in bed, and avoid soft couches and twisted positions. Bed rest can help relieve pain at first, but it delays healing. Avoid bed rest after the first day of back pain. · Change positions every 30 minutes. If you must sit for long periods of time, take breaks from sitting. Get up and walk around, or lie in a comfortable position. · Try using a heating pad on a low or medium setting for 15 to 20 minutes every 2 or 3 hours.  Try a warm shower in place of one session with the heating pad. · You can also try an ice pack for 10 to 15 minutes every 2 to 3 hours. Put a thin cloth between the ice pack and your skin. · Take pain medicines exactly as directed. ? If the doctor gave you a prescription medicine for pain, take it as prescribed. ? If you are not taking a prescription pain medicine, ask your doctor if you can take an over-the-counter medicine. · Take short walks several times a day. You can start with 5 to 10 minutes, 3 or 4 times a day, and work up to longer walks. Walk on level surfaces and avoid hills and stairs until your back is better. · Return to work and other activities as soon as you can. Continued rest without activity is usually not good for your back. · To prevent future back pain, do exercises to stretch and strengthen your back and stomach. Learn how to use good posture, safe lifting techniques, and proper body mechanics. When should you call for help? Call your doctor now or seek immediate medical care if:    · You have new or worsening numbness in your legs.     · You have new or worsening weakness in your legs. (This could make it hard to stand up.)     · You lose control of your bladder or bowels.    Watch closely for changes in your health, and be sure to contact your doctor if:    · You have a fever, lose weight, or don't feel well.     · You do not get better as expected. Where can you learn more? Go to http://yulia-amilcar.info/. Enter Y221 in the search box to learn more about \"Back Pain: Care Instructions. \"  Current as of: September 20, 2018  Content Version: 12.1  © 8582-1266 [employer]. Care instructions adapted under license by [employer] (which disclaims liability or warranty for this information).  If you have questions about a medical condition or this instruction, always ask your healthcare professional. [employer] disclaims any warranty or liability for your use of this information. Learning About Low Back Pain  What is low back pain? Low back pain is pain that can occur anywhere below the ribs and above the legs. It is very common. Almost everyone has it at one time or another. Low back pain can be:  Acute. This is new pain that can last a few days to a few weeks--at the most a few months. Chronic. This pain can last for more than a few months. Sometimes it can last for years. What are some myths about low back pain? Here are some common myths about low back pain--and the facts:  Myth: \"I need to rest my back when I have back pain. \"  Fact: Staying active won't hurt you. It may help you get better faster. Myth: \"I need prescription pain medicine. \"  Fact: It's best to try to let time and being active heal your back. Opioid pain medicines--such as hydrocodone or oxycodone--usually don't work any better than over-the-counter medicines like ibuprofen or naproxen. And opioids can cause serious problems like opioid use disorder or overdose. Moderate to severe opioid use disorder is sometimes called addiction. Myth: \"I need a test like an X-ray or an MRI to diagnose my low back pain. \"  Fact: Getting a test right away won't help you get better faster. And it could lead you down a treatment path you may not need, since most people get better on their own. What causes low back pain? In most cases, there isn't a clear cause. This can be frustrating, because your back hurts and there's no obvious reason. Your back pain can be caused by:  Overuse or muscle strain. This can happen from playing sports, lifting heavy things, or not being physically fit. A herniated disc. This is a problem with the cushion between the bones in your back. Arthritis. With age, you may have changes in your bones that can narrow the space around your nerves. Other causes. In rare cases, the cause is a serious illness like an infection or cancer.  But there are usually other symptoms too. What are the symptoms? Your symptoms depend on your body and the cause of your back pain. You may feel:  · Pain that's sharp or dull. It may be in one small area or over a broad area. But even bad pain doesn't mean that it's caused by something serious. · Leg pain, numbness, or tingling. When a nerve gets squeezed--such as from a disc problem or arthritis--you may have symptoms in your leg or foot. You can even have leg symptoms from a back problem without having any pain in your back. How is low back pain diagnosed? A physical exam is the main way to diagnose low back pain. Your doctor may examine your back, check your nerves by testing your reflexes, and make sure that your muscles are strong. He or she also will ask questions about your back and overall health. Most people don't need any tests right away. Tests often don't show the reason for your pain. If your pain lasts more than 6 weeks or you have symptoms that your doctor is more concerned about, he or she may order tests. These may include an X-ray, a CT scan, or an MRI. In some cases, tests can help your doctor find a cause for your pain, especially for pain in one or both legs. How is low back pain treated? Most acute low back pain gets better on its own within a few weeks, no matter what the cause. Time and doing usual activities are all that most people need to feel better. Using heat or ice and taking over-the-counter pain medicine also can help while your body heals. If you aren't getting better on your own or your pain is very bad, your doctor may recommend:  · Physical therapy. · Spinal manipulation, such as by a chiropractor. · Acupuncture. · Massage. · Injections of steroid medicine in your back (especially for pain that involves your legs). If you have chronic low back pain, treatment will help you understand and manage your pain. Treatment may include:  · Staying active.  This may include walking or doing back exercises. · Physical therapy. · Medicines. Some of these medicines are also used for other problems, like depression. · Pain management. Your doctor may have you see a pain specialist.  · Counseling. Having chronic pain can be hard. It may help to talk to someone who can help you cope with your pain. Surgery isn't needed for most people. But it may help some types of low back pain. Follow-up care is a key part of your treatment and safety. Be sure to make and go to all appointments, and call your doctor if you are having problems. It's also a good idea to know your test results and keep a list of the medicines you take. When should you call for help? Call 911 anytime you think you may need emergency care. For example, call if:  · You can't move a leg at all. Call your doctor now or seek immediate medical care if:  · You have new or worse symptoms in your legs, belly, or buttocks. Symptoms may include:  ? Numbness or tingling. ? Weakness. ? Pain. · You lose bladder or bowel control. Watch closely for changes in your health, and be sure to contact your doctor if:  · Along with the back pain, you have a fever, lose weight, or don't feel well. · You do not get better as expected. Where can you learn more? Go to http://yulia-amilcar.info/. Enter A007 in the search box to learn more about \"Learning About Low Back Pain. \"  Current as of: September 20, 2018  Content Version: 12.1  © 1434-4316 [employer]. Care instructions adapted under license by [employer] (which disclaims liability or warranty for this information). If you have questions about a medical condition or this instruction, always ask your healthcare professional. [place] [address] any warranty or liability for your use of this information. Low Back Pain: Exercises  Introduction  Here are some examples of exercises for you to try.  The exercises may be suggested for a condition or for rehabilitation. Start each exercise slowly. Ease off the exercises if you start to have pain. You will be told when to start these exercises and which ones will work best for you. How to do the exercises  Press-up    1. Lie on your stomach, supporting your body with your forearms. 2. Press your elbows down into the floor to raise your upper back. As you do this, relax your stomach muscles and allow your back to arch without using your back muscles. As your press up, do not let your hips or pelvis come off the floor. 3. Hold for 15 to 30 seconds, then relax. 4. Repeat 2 to 4 times. Alternate arm and leg (bird dog) exercise    1. Start on the floor, on your hands and knees. 2. Tighten your belly muscles. 3. Raise one leg off the floor, and hold it straight out behind you. Be careful not to let your hip drop down, because that will twist your trunk. 4. Hold for about 6 seconds, then lower your leg and switch to the other leg. 5. Repeat 8 to 12 times on each leg. 6. Over time, work up to holding for 10 to 30 seconds each time. 7. If you feel stable and secure with your leg raised, try raising the opposite arm straight out in front of you at the same time. Knee-to-chest exercise    1. Lie on your back with your knees bent and your feet flat on the floor. 2. Bring one knee to your chest, keeping the other foot flat on the floor (or keeping the other leg straight, whichever feels better on your lower back). 3. Keep your lower back pressed to the floor. Hold for at least 15 to 30 seconds. 4. Relax, and lower the knee to the starting position. 5. Repeat with the other leg. Repeat 2 to 4 times with each leg. 6. To get more stretch, put your other leg flat on the floor while pulling your knee to your chest.    Curl-ups    1. Lie on the floor on your back with your knees bent at a 90-degree angle. Your feet should be flat on the floor, about 12 inches from your buttocks.   2. Cross your arms over your chest. If this bothers your neck, try putting your hands behind your neck (not your head), with your elbows spread apart. 3. Slowly tighten your belly muscles and raise your shoulder blades off the floor. 4. Keep your head in line with your body, and do not press your chin to your chest.  5. Hold this position for 1 or 2 seconds, then slowly lower yourself back down to the floor. 6. Repeat 8 to 12 times. Pelvic tilt exercise    1. Lie on your back with your knees bent. 2. \"Brace\" your stomach. This means to tighten your muscles by pulling in and imagining your belly button moving toward your spine. You should feel like your back is pressing to the floor and your hips and pelvis are rocking back. 3. Hold for about 6 seconds while you breathe smoothly. 4. Repeat 8 to 12 times. Heel dig bridging    1. Lie on your back with both knees bent and your ankles bent so that only your heels are digging into the floor. Your knees should be bent about 90 degrees. 2. Then push your heels into the floor, squeeze your buttocks, and lift your hips off the floor until your shoulders, hips, and knees are all in a straight line. 3. Hold for about 6 seconds as you continue to breathe normally, and then slowly lower your hips back down to the floor and rest for up to 10 seconds. 4. Do 8 to 12 repetitions. Hamstring stretch in doorway    1. Lie on your back in a doorway, with one leg through the open door. 2. Slide your leg up the wall to straighten your knee. You should feel a gentle stretch down the back of your leg. 3. Hold the stretch for at least 15 to 30 seconds. Do not arch your back, point your toes, or bend either knee. Keep one heel touching the floor and the other heel touching the wall. 4. Repeat with your other leg. 5. Do 2 to 4 times for each leg. Hip flexor stretch    1. Kneel on the floor with one knee bent and one leg behind you. Place your forward knee over your foot.  Keep your other knee touching the floor. 2. Slowly push your hips forward until you feel a stretch in the upper thigh of your rear leg. 3. Hold the stretch for at least 15 to 30 seconds. Repeat with your other leg. 4. Do 2 to 4 times on each side. Wall sit    1. Stand with your back 10 to 12 inches away from a wall. 2. Lean into the wall until your back is flat against it. 3. Slowly slide down until your knees are slightly bent, pressing your lower back into the wall. 4. Hold for about 6 seconds, then slide back up the wall. 5. Repeat 8 to 12 times. Follow-up care is a key part of your treatment and safety. Be sure to make and go to all appointments, and call your doctor if you are having problems. It's also a good idea to know your test results and keep a list of the medicines you take. Where can you learn more? Go to http://yulia-amilcar.info/. Enter J774 in the search box to learn more about \"Low Back Pain: Exercises. \"  Current as of: September 20, 2018  Content Version: 12.1  © 6319-3038 [employer]. Care instructions adapted under license by [employer] (which disclaims liability or warranty for this information). If you have questions about a medical condition or this instruction, always ask your healthcare professional. [address] any warranty or liability for your use of this information.

## 2019-07-26 NOTE — LETTER
NOTIFICATION RETURN TO WORK / SCHOOL 
 
7/26/2019 6:11 PM 
 
Ms. Via Lansing [address] [place] [id number] To Whom It May Concern: 
 
Lilian Virgen is currently under the care of [address] [place] [address]. She will return to work/school on: 7/28/19 If there are questions or concerns please have the patient contact our office. Sincerely, Elle Sánchez

## 2019-07-26 NOTE — PROGRESS NOTES
Pt was lifting something heavy on Wed and Thurs AM was sore and pain with getting out of bed. Stiffness and some pain and tingling in the outside of her rt leg with certain movements    The history is provided by the patient. Back Pain    The history is provided by the patient. This is a new problem. The current episode started 2 days ago. The problem has not changed since onset. The problem occurs constantly. Work related: not sure but she thinks so. The pain is associated with lifting (she thinks it is from lifting somethig heavy at work but she didn't have immediate pain but pain the next day). The pain is present in the sacro-iliac joint. The quality of the pain is described as shooting, aching and burning. The pain radiates to the right thigh. The pain is moderate. The symptoms are aggravated by certain positions. Associated symptoms include numbness (feels like its numb sometimes but then gets better), leg pain (rt outer thigh) and paresthesias. Pertinent negatives include no bowel incontinence, no perianal numbness, no bladder incontinence, no dysuria, no pelvic pain, no paresis, no tingling and no weakness. She has tried NSAIDs for the symptoms. The treatment provided mild relief. Risk factors include obesity.         Past Medical History:   Diagnosis Date    Asthma     Bipolar disorder (manic depression) ([facility] [address] 2007    Community acquired pneumonia     GERD (gastroesophageal reflux disease)     Headache(784.0)     Migraine    Heart murmur 01/1993    Lung nodule     Neurological disorder     migraines    Second hand smoke exposure         Past Surgical History:   Procedure Laterality Date    HX CHOLECYSTECTOMY      HX HEENT      wisdom teeth extraction    HX HEENT      BMWT         Family History   Problem Relation Age of Onset    Diabetes Father     Heart Disease Father     Hypertension Father     Anxiety Father     COPD Father     Diabetes Mother     Hypertension Mother     Anxiety Mother  COPD Mother     Diabetes Paternal Grandmother         Social History     Socioeconomic History    Marital status: SINGLE     Spouse name: Not on file    Number of children: Not on file    Years of education: Not on file    Highest education level: Not on file   Occupational History    Not on file   Social Needs    Financial resource strain: Not on file    Food insecurity:     Worry: Not on file     Inability: Not on file    Transportation needs:     Medical: Not on file     Non-medical: Not on file   Tobacco Use    Smoking status: Former Smoker     Packs/day: 1.00     Years: 5.00     Pack years: 5.00     Last attempt to quit: 2017     Years since quittin.0    Smokeless tobacco: Never Used   Substance and Sexual Activity    Alcohol use: No    Drug use: No     Frequency: 1.0 times per week     Comment: denies any marijuana    Sexual activity: Yes     Partners: Male     Birth control/protection: Condom   Lifestyle    Physical activity:     Days per week: Not on file     Minutes per session: Not on file    Stress: Not on file   Relationships    Social connections:     Talks on phone: Not on file     Gets together: Not on file     Attends [employer] service: Not on file     Active member of club or organization: Not on file     Attends meetings of clubs or organizations: Not on file     Relationship status: Not on file    Intimate partner violence:     Fear of current or ex partner: Not on file     Emotionally abused: Not on file     Physically abused: Not on file     Forced sexual activity: Not on file   Other Topics Concern    Not on file   Social History Narrative    ** Merged History Encounter **                     ALLERGIES: Diclofenac    Review of Systems   Constitutional: Negative. Respiratory: Negative. Cardiovascular: Negative. Gastrointestinal: Negative. Negative for bowel incontinence. Genitourinary: Negative.   Negative for bladder incontinence, difficulty urinating, dysuria, flank pain, frequency, menstrual problem and pelvic pain. Pregnancy test offered and pt declined     Musculoskeletal: Positive for back pain. Neurological: Positive for numbness (feels like its numb sometimes but then gets better) and paresthesias. Negative for tingling and weakness. Vitals:    07/26/19 1745   BP: 132/80   Pulse: 80   Resp: 20   Temp: 98.4 °F (36.9 °C)   SpO2: 99%   Weight: 196 lb (88.9 kg)   Height: 5' 1\" (1.549 m)       Physical Exam   Constitutional: She is oriented to person, place, and time. She appears well-developed and well-nourished. HENT:   Head: Normocephalic and atraumatic. Mouth/Throat: Oropharynx is clear and moist. No oropharyngeal exudate. Eyes: Conjunctivae and EOM are normal. Right eye exhibits no discharge. Left eye exhibits no discharge. No scleral icterus. Neck: Normal range of motion. No thyromegaly present. Cardiovascular: Normal rate, regular rhythm and normal heart sounds. No murmur heard. Pulmonary/Chest: Effort normal and breath sounds normal. No respiratory distress. She has no wheezes. She has no rales. Abdominal: Soft. Bowel sounds are normal. She exhibits no distension. There is no tenderness. There is no rebound and no guarding. Musculoskeletal: She exhibits tenderness. She exhibits no edema. Lumbar back: She exhibits decreased range of motion, tenderness, pain and spasm. She exhibits no bony tenderness, no swelling, no edema, no deformity, no laceration and normal pulse. Back:         Arms:  Muscle tension and tenderness rt lower back and tenderness on palpation at the rt PSIS ROM mildly limited at lumbar flexion due to pain   Neurological: She is alert and oriented to person, place, and time. She has normal reflexes. She displays normal reflexes. No cranial nerve deficit. She exhibits normal muscle tone. Coordination normal.   Ambulatory and stable gait   Skin: Skin is warm. No erythema.    Psychiatric: She has a normal mood and affect. Her behavior is normal. Judgment and thought content normal.   Nursing note and vitals reviewed. MDM    ICD-10-CM ICD-9-CM    1. Lumbar back pain with radiculopathy affecting right lower extremity M54.16 724.4      Medications Ordered Today   Medications    methocarbamol (ROBAXIN) 500 mg tablet     Sig: Take 1 Tab by mouth four (4) times daily. Dispense:  12 Tab     Refill:  0    predniSONE (STERAPRED DS) 10 mg dose pack     Sig: Take 6 pills on day 1, 5 pills on day 2, 4 pills on day 3, 3 pills on day 4, 2 pills on day 5 and one pill on day 6     Dispense:  21 Tab     Refill:  0     The patients condition was discussed with the patient and they understand. The patient is to follow up with primary care doctor ,If signs and symptoms become worse the pt is to go to the ER. The patient is to take medications as prescribed.                Procedures

## 2020-02-22 ENCOUNTER — APPOINTMENT (OUTPATIENT)
Dept: CT IMAGING | Age: 30
End: 2020-02-22
Attending: EMERGENCY MEDICINE
Payer: COMMERCIAL

## 2020-02-22 ENCOUNTER — HOSPITAL ENCOUNTER (EMERGENCY)
Age: 30
Discharge: HOME | End: 2020-02-22
Attending: EMERGENCY MEDICINE
Payer: COMMERCIAL

## 2020-02-22 VITALS
HEIGHT: 61 IN | TEMPERATURE: 98.8 F | OXYGEN SATURATION: 98 % | SYSTOLIC BLOOD PRESSURE: 137 MMHG | DIASTOLIC BLOOD PRESSURE: 72 MMHG | RESPIRATION RATE: 18 BRPM | BODY MASS INDEX: 39.71 KG/M2 | WEIGHT: 210.32 LBS | HEART RATE: 84 BPM

## 2020-02-22 DIAGNOSIS — R10.31 RLQ ABDOMINAL PAIN: ICD-10-CM

## 2020-02-22 DIAGNOSIS — A08.4 VIRAL GASTROENTERITIS: Primary | ICD-10-CM

## 2020-02-22 LAB
ALBUMIN SERPL-MCNC: 4.1 G/DL (ref 3.5–5)
ALBUMIN/GLOB SERPL: 0.8 {RATIO} (ref 1.1–2.2)
ALP SERPL-CCNC: 150 U/L (ref 45–117)
ALT SERPL-CCNC: 31 U/L (ref 12–78)
ANION GAP SERPL CALC-SCNC: 13 MMOL/L (ref 5–15)
APPEARANCE UR: CLEAR
AST SERPL-CCNC: 23 U/L (ref 15–37)
BACTERIA URNS QL MICRO: ABNORMAL /HPF
BASOPHILS # BLD: 0 K/UL (ref 0–0.1)
BASOPHILS NFR BLD: 0 % (ref 0–1)
BILIRUB SERPL-MCNC: 0.2 MG/DL (ref 0.2–1)
BILIRUB UR QL: NEGATIVE
BUN SERPL-MCNC: 9 MG/DL (ref 6–20)
BUN/CREAT SERPL: 10 (ref 12–20)
CALCIUM SERPL-MCNC: 9.5 MG/DL (ref 8.5–10.1)
CHLORIDE SERPL-SCNC: 102 MMOL/L (ref 97–108)
CO2 SERPL-SCNC: 27 MMOL/L (ref 21–32)
COLOR UR: ABNORMAL
CREAT SERPL-MCNC: 0.91 MG/DL (ref 0.55–1.02)
DIFFERENTIAL METHOD BLD: ABNORMAL
EOSINOPHIL # BLD: 0 K/UL (ref 0–0.4)
EOSINOPHIL NFR BLD: 0 % (ref 0–7)
EPITH CASTS URNS QL MICRO: ABNORMAL /LPF
ERYTHROCYTE [DISTWIDTH] IN BLOOD BY AUTOMATED COUNT: 13.5 % (ref 11.5–14.5)
GLOBULIN SER CALC-MCNC: 5.2 G/DL (ref 2–4)
GLUCOSE SERPL-MCNC: 121 MG/DL (ref 65–100)
GLUCOSE UR STRIP.AUTO-MCNC: NEGATIVE MG/DL
HCG UR QL: NEGATIVE
HCT VFR BLD AUTO: 40.6 % (ref 35–47)
HGB BLD-MCNC: 12.9 G/DL (ref 11.5–16)
HGB UR QL STRIP: ABNORMAL
HYALINE CASTS URNS QL MICRO: ABNORMAL /LPF (ref 0–5)
IMM GRANULOCYTES # BLD AUTO: 0.1 K/UL (ref 0–0.04)
IMM GRANULOCYTES NFR BLD AUTO: 1 % (ref 0–0.5)
KETONES UR QL STRIP.AUTO: NEGATIVE MG/DL
LEUKOCYTE ESTERASE UR QL STRIP.AUTO: NEGATIVE
LIPASE SERPL-CCNC: 139 U/L (ref 73–393)
LYMPHOCYTES # BLD: 2.1 K/UL (ref 0.8–3.5)
LYMPHOCYTES NFR BLD: 15 % (ref 12–49)
MCH RBC QN AUTO: 28 PG (ref 26–34)
MCHC RBC AUTO-ENTMCNC: 31.8 G/DL (ref 30–36.5)
MCV RBC AUTO: 88.1 FL (ref 80–99)
MONOCYTES # BLD: 0.7 K/UL (ref 0–1)
MONOCYTES NFR BLD: 5 % (ref 5–13)
MUCOUS THREADS URNS QL MICRO: ABNORMAL /LPF
NEUTS SEG # BLD: 10.7 K/UL (ref 1.8–8)
NEUTS SEG NFR BLD: 79 % (ref 32–75)
NITRITE UR QL STRIP.AUTO: NEGATIVE
NRBC # BLD: 0 K/UL (ref 0–0.01)
NRBC BLD-RTO: 0 PER 100 WBC
PH UR STRIP: 8 [PH] (ref 5–8)
PLATELET # BLD AUTO: 537 K/UL (ref 150–400)
PMV BLD AUTO: 9.3 FL (ref 8.9–12.9)
POTASSIUM SERPL-SCNC: 3.9 MMOL/L (ref 3.5–5.1)
PROT SERPL-MCNC: 9.3 G/DL (ref 6.4–8.2)
PROT UR STRIP-MCNC: 100 MG/DL
RBC # BLD AUTO: 4.61 M/UL (ref 3.8–5.2)
RBC #/AREA URNS HPF: ABNORMAL /HPF (ref 0–5)
SODIUM SERPL-SCNC: 142 MMOL/L (ref 136–145)
SP GR UR REFRACTOMETRY: 1.02 (ref 1–1.03)
UROBILINOGEN UR QL STRIP.AUTO: 0.2 EU/DL (ref 0.2–1)
WBC # BLD AUTO: 13.6 K/UL (ref 3.6–11)
WBC URNS QL MICRO: ABNORMAL /HPF (ref 0–4)

## 2020-02-22 PROCEDURE — 85025 COMPLETE CBC W/AUTO DIFF WBC: CPT

## 2020-02-22 PROCEDURE — 74177 CT ABD & PELVIS W/CONTRAST: CPT

## 2020-02-22 PROCEDURE — 36415 COLL VENOUS BLD VENIPUNCTURE: CPT

## 2020-02-22 PROCEDURE — 80053 COMPREHEN METABOLIC PANEL: CPT

## 2020-02-22 PROCEDURE — 96375 TX/PRO/DX INJ NEW DRUG ADDON: CPT

## 2020-02-22 PROCEDURE — 81001 URINALYSIS AUTO W/SCOPE: CPT

## 2020-02-22 PROCEDURE — 74011250636 HC RX REV CODE- 250/636: Performed by: EMERGENCY MEDICINE

## 2020-02-22 PROCEDURE — 99283 EMERGENCY DEPT VISIT LOW MDM: CPT

## 2020-02-22 PROCEDURE — 81025 URINE PREGNANCY TEST: CPT

## 2020-02-22 PROCEDURE — 83690 ASSAY OF LIPASE: CPT

## 2020-02-22 PROCEDURE — 96374 THER/PROPH/DIAG INJ IV PUSH: CPT

## 2020-02-22 PROCEDURE — 74011636320 HC RX REV CODE- 636/320: Performed by: EMERGENCY MEDICINE

## 2020-02-22 RX ORDER — SODIUM CHLORIDE 9 MG/ML
50 INJECTION, SOLUTION INTRAVENOUS
Status: COMPLETED | OUTPATIENT
Start: 2020-02-22 | End: 2020-02-22

## 2020-02-22 RX ORDER — KETOROLAC TROMETHAMINE 30 MG/ML
15 INJECTION, SOLUTION INTRAMUSCULAR; INTRAVENOUS
Status: COMPLETED | OUTPATIENT
Start: 2020-02-22 | End: 2020-02-22

## 2020-02-22 RX ORDER — ONDANSETRON 2 MG/ML
4 INJECTION INTRAMUSCULAR; INTRAVENOUS
Status: COMPLETED | OUTPATIENT
Start: 2020-02-22 | End: 2020-02-22

## 2020-02-22 RX ORDER — ONDANSETRON 4 MG/1
4 TABLET, ORALLY DISINTEGRATING ORAL
Qty: 15 TAB | Refills: 0 | Status: SHIPPED | OUTPATIENT
Start: 2020-02-22 | End: 2020-03-06

## 2020-02-22 RX ORDER — ERGOCALCIFEROL 1.25 MG/1
50000 CAPSULE ORAL
COMMUNITY

## 2020-02-22 RX ORDER — SODIUM CHLORIDE 0.9 % (FLUSH) 0.9 %
10 SYRINGE (ML) INJECTION
Status: COMPLETED | OUTPATIENT
Start: 2020-02-22 | End: 2020-02-22

## 2020-02-22 RX ADMIN — IOPAMIDOL 100 ML: 755 INJECTION, SOLUTION INTRAVENOUS at 08:27

## 2020-02-22 RX ADMIN — ONDANSETRON 4 MG: 2 INJECTION, SOLUTION INTRAMUSCULAR; INTRAVENOUS at 07:36

## 2020-02-22 RX ADMIN — KETOROLAC TROMETHAMINE 15 MG: 30 INJECTION, SOLUTION INTRAMUSCULAR at 07:37

## 2020-02-22 RX ADMIN — SODIUM CHLORIDE 1000 ML: 900 INJECTION, SOLUTION INTRAVENOUS at 07:37

## 2020-02-22 RX ADMIN — Medication 10 ML: at 08:27

## 2020-02-22 RX ADMIN — SODIUM CHLORIDE 50 ML/HR: 900 INJECTION, SOLUTION INTRAVENOUS at 08:27

## 2020-02-22 NOTE — ED NOTES
Pt d/c'd home. IV d/c'd cath intact. Pt declined w/c and left ambulatory in care of friend. Pt given d/c instruction and rx x1 and pt verbalized understanding.

## 2020-02-22 NOTE — ED PROVIDER NOTES
82-year-old female with history of asthma, GERD, migraines, bipolar, presents to the emergency department noting an approximately 1 to 2-week history of diarrhea, that then developed nausea, nonbloody vomiting multiple times and developing sharp abdominal pains that wax and wane and seem to be alleviated with vomiting in her right upper and right lower quadrants. She notes a history of prior cholecystectomy. She denies any fever, chills, dysuria, hematuria, vaginal bleeding, vaginal discharge. LMP was about a week ago and was normal for her. She denies any known sick contacts or recent travel or suspicious food intake. She rates her pain as mild 2-3/10. She took a dose of Pepcid and Ativan prior to arrival and had some slight improvement in her symptoms. Of note the patient also states that she has been tapering herself off of her risperidone for her bipolar disorder and over the last 4 days she has been off of it. She states that as a result of that she has had increased anxiety and difficulty sleeping. She is pleasant and cooperative in the ED.            Past Medical History:   Diagnosis Date    Asthma     Bipolar disorder (manic depression) ([facility] [address] 2007    Community acquired pneumonia     GERD (gastroesophageal reflux disease)     Headache(784.0)     Migraine    Heart murmur 01/1993    Lung nodule     Neurological disorder     migraines    Second hand smoke exposure        Past Surgical History:   Procedure Laterality Date    HX CHOLECYSTECTOMY      HX HEENT      wisdom teeth extraction    HX HEENT      BMWT         Family History:   Problem Relation Age of Onset    Diabetes Father     Heart Disease Father     Hypertension Father     Anxiety Father     COPD Father     Diabetes Mother     Hypertension Mother     Anxiety Mother     COPD Mother     Diabetes Paternal Grandmother        Social History     Socioeconomic History    Marital status: SINGLE     Spouse name: Not on file    Number of children: Not on file    Years of education: Not on file    Highest education level: Not on file   Occupational History    Not on file   Social Needs    Financial resource strain: Not on file    Food insecurity:     Worry: Not on file     Inability: Not on file    Transportation needs:     Medical: Not on file     Non-medical: Not on file   Tobacco Use    Smoking status: Former Smoker     Packs/day: 1.00     Years: 5.00     Pack years: 5.00     Last attempt to quit: 2017     Years since quittin.5    Smokeless tobacco: Never Used   Substance and Sexual Activity    Alcohol use: No    Drug use: No     Frequency: 1.0 times per week     Comment: denies any marijuana    Sexual activity: Yes     Partners: Male     Birth control/protection: Condom   Lifestyle    Physical activity:     Days per week: Not on file     Minutes per session: Not on file    Stress: Not on file   Relationships    Social connections:     Talks on phone: Not on file     Gets together: Not on file     Attends Pent[employer]ism service: Not on file     Active member of club or organization: Not on file     Attends meetings of clubs or organizations: Not on file     Relationship status: Not on file    Intimate partner violence:     Fear of current or ex partner: Not on file     Emotionally abused: Not on file     Physically abused: Not on file     Forced sexual activity: Not on file   Other Topics Concern    Not on file   Social History Narrative    ** Merged History Encounter **              ALLERGIES: Diclofenac    Review of Systems   Constitutional: Positive for activity change and appetite change. Negative for chills and fever. HENT: Negative for congestion, rhinorrhea, sinus pressure, sneezing and sore throat. Eyes: Negative for photophobia and visual disturbance. Respiratory: Negative for cough and shortness of breath. Cardiovascular: Negative for chest pain.    Gastrointestinal: Positive for abdominal pain, diarrhea, nausea and vomiting. Negative for blood in stool and constipation. Genitourinary: Negative for difficulty urinating, dysuria, flank pain, frequency, hematuria, menstrual problem, urgency, vaginal bleeding and vaginal discharge. Musculoskeletal: Negative for arthralgias, back pain, myalgias and neck pain. Skin: Negative for rash and wound. Neurological: Negative for syncope, weakness, numbness and headaches. Psychiatric/Behavioral: Positive for sleep disturbance. Negative for self-injury and suicidal ideas. The patient is nervous/anxious. All other systems reviewed and are negative. Vitals:    02/22/20 0654   BP: 137/72   Pulse: 84   Resp: 18   Temp: 98.8 °F (37.1 °C)   SpO2: 98%   Weight: 95.4 kg (210 lb 5.1 oz)   Height: 5' 1\" (1.549 m)            Physical Exam  Vitals signs and nursing note reviewed. Constitutional:       General: She is not in acute distress. Appearance: Normal appearance. She is well-developed. She is not diaphoretic. HENT:      Head: Normocephalic and atraumatic. Nose: Nose normal.   Eyes:      Extraocular Movements: Extraocular movements intact. Conjunctiva/sclera: Conjunctivae normal.      Pupils: Pupils are equal, round, and reactive to light. Neck:      Musculoskeletal: Neck supple. Cardiovascular:      Rate and Rhythm: Normal rate and regular rhythm. Heart sounds: Normal heart sounds. Pulmonary:      Effort: Pulmonary effort is normal.      Breath sounds: Normal breath sounds. Abdominal:      General: There is no distension. Palpations: Abdomen is soft. Tenderness: There is abdominal tenderness in the right lower quadrant. There is no right CVA tenderness, left CVA tenderness, guarding or rebound. Positive signs include McBurney's sign. Negative signs include Oliva's sign. Musculoskeletal:         General: No tenderness. Skin:     General: Skin is warm and dry. Neurological:      General: No focal deficit present. Mental Status: She is alert and oriented to person, place, and time. Cranial Nerves: No cranial nerve deficit. Sensory: No sensory deficit. Motor: No weakness. Coordination: Coordination normal.          MDM   30-year-old female presents with 1 day history of nausea, vomiting, recent nonbloody diarrhea, exam as above with tenderness in the right lower quadrant. Patient is afebrile with vital signs stable in no acute distress. She was given a dose of Zofran, Toradol, IV fluid bolus in the ED. Labs returned showing WBC 13.6, , otherwise reassuring. UA shows trace blood but no evidence of UTI, with some moderate epithelial contamination. HCG negative    CT abdomen pelvis shows no acute abnormalities. She is tolerating p.o. ginger ale in the ED without further emesis. She was Rx'd Zofran and recommended OTC pain relievers as needed. The symptoms are likely secondary to viral gastroenteritis and she was recommended emphasis on p.o. hydration with gradual reintroduction of food. Also feel that her difficulty sleeping is likely secondary to her stopping Risperdal.  She was recommended to follow-up closely with her psychiatrist for further evaluation of her mental health medication regimen. Return precautions were given for worsening or concerns.     Procedures

## 2020-02-22 NOTE — ED TRIAGE NOTES
Patient arrives with c/o vomiting since yesterday morning and RUQ abdominal pain. Patient states she stopped taking Risperidone 4 days ago.

## 2020-03-06 ENCOUNTER — HOSPITAL ENCOUNTER (EMERGENCY)
Age: 30
Discharge: HOME | End: 2020-03-06
Attending: EMERGENCY MEDICINE
Payer: COMMERCIAL

## 2020-03-06 VITALS
HEART RATE: 86 BPM | DIASTOLIC BLOOD PRESSURE: 80 MMHG | OXYGEN SATURATION: 100 % | RESPIRATION RATE: 18 BRPM | TEMPERATURE: 97.9 F | SYSTOLIC BLOOD PRESSURE: 134 MMHG

## 2020-03-06 DIAGNOSIS — S16.1XXA STRAIN OF NECK MUSCLE, INITIAL ENCOUNTER: Primary | ICD-10-CM

## 2020-03-06 PROCEDURE — 74011000250 HC RX REV CODE- 250: Performed by: EMERGENCY MEDICINE

## 2020-03-06 PROCEDURE — 74011250637 HC RX REV CODE- 250/637: Performed by: EMERGENCY MEDICINE

## 2020-03-06 PROCEDURE — 99283 EMERGENCY DEPT VISIT LOW MDM: CPT

## 2020-03-06 RX ORDER — IBUPROFEN 600 MG/1
600 TABLET ORAL
Status: COMPLETED | OUTPATIENT
Start: 2020-03-06 | End: 2020-03-06

## 2020-03-06 RX ORDER — METHOCARBAMOL 500 MG/1
500-1000 TABLET, FILM COATED ORAL
Qty: 20 TAB | Refills: 0 | Status: SHIPPED | OUTPATIENT
Start: 2020-03-06 | End: 2022-07-19

## 2020-03-06 RX ORDER — LIDOCAINE 50 MG/G
PATCH TOPICAL
Qty: 30 EACH | Refills: 0 | Status: SHIPPED | OUTPATIENT
Start: 2020-03-06 | End: 2022-07-19

## 2020-03-06 RX ORDER — LIDOCAINE 4 G/100G
2 PATCH TOPICAL
Status: DISCONTINUED | OUTPATIENT
Start: 2020-03-06 | End: 2020-03-06 | Stop reason: HOSPADM

## 2020-03-06 RX ADMIN — IBUPROFEN 600 MG: 600 TABLET, FILM COATED ORAL at 15:34

## 2020-03-06 NOTE — ED NOTES
Pt verbalizes understanding of dc teaching, denies further questions. Pt ambulated with steady gait, left with family member. Pt in NAD.

## 2020-03-06 NOTE — ED PROVIDER NOTES
This patient started having left-sided neck pain along her trapezius muscle 5 days ago during the day. No known trauma. It hurts to move her neck left and right. Extension also hurts it. No fever or chills. No history of intravenous drug use ever. She had tattoos placed to her right arm according to her friend 4 weeks ago. No unexplained fever. No numbness or tingling in the arms or legs. No urinary retention symptoms. No loss of bowel or bladder function. The pain is left paracervical, trapezius, and radiates sometimes to her axilla and other times to the left deltoid. It does not go below that. She does not really have radicular symptoms. She went to her primary care on Wednesday, 2 days ago. She was given IM Toradol without relief. She had an old Flexeril at home. She split a 10 mg pill in half and took that 4 hours ago. That did nothing. This pain started without trauma. No weakness in the hands or arms. Old chart reviewed: Last ED visit was last month for viral gastroenteritis and abdominal pain.            Past Medical History:   Diagnosis Date    Asthma     Bipolar disorder (manic depression) ([facility] [address] 2007    Community acquired pneumonia     GERD (gastroesophageal reflux disease)     Headache(784.0)     Migraine    Heart murmur 01/1993    Lung nodule     Neurological disorder     migraines    Second hand smoke exposure        Past Surgical History:   Procedure Laterality Date    HX CHOLECYSTECTOMY      HX HEENT      wisdom teeth extraction    HX HEENT      BMWT         Family History:   Problem Relation Age of Onset    Diabetes Father     Heart Disease Father     Hypertension Father     Anxiety Father     COPD Father     Diabetes Mother     Hypertension Mother     Anxiety Mother     COPD Mother     Diabetes Paternal Grandmother        Social History     Socioeconomic History    Marital status: SINGLE     Spouse name: Not on file    Number of children: Not on file  Years of education: Not on file    Highest education level: Not on file   Occupational History    Not on file   Social Needs    Financial resource strain: Not on file    Food insecurity:     Worry: Not on file     Inability: Not on file    Transportation needs:     Medical: Not on file     Non-medical: Not on file   Tobacco Use    Smoking status: Former Smoker     Packs/day: 1.00     Years: 5.00     Pack years: 5.00     Last attempt to quit: 2017     Years since quittin.6    Smokeless tobacco: Never Used   Substance and Sexual Activity    Alcohol use: No    Drug use: No     Frequency: 1.0 times per week     Comment: denies any marijuana    Sexual activity: Yes     Partners: Male     Birth control/protection: Condom   Lifestyle    Physical activity:     Days per week: Not on file     Minutes per session: Not on file    Stress: Not on file   Relationships    Social connections:     Talks on phone: Not on file     Gets together: Not on file     Attends Christian service: Not on file     Active member of club or organization: Not on file     Attends meetings of clubs or organizations: Not on file     Relationship status: Not on file    Intimate partner violence:     Fear of current or ex partner: Not on file     Emotionally abused: Not on file     Physically abused: Not on file     Forced sexual activity: Not on file   Other Topics Concern    Not on file   Social History Narrative    ** Merged History Encounter **              ALLERGIES: Diclofenac    Review of Systems    Vitals:    20 1500   BP: 134/80   Pulse: 86   Resp: 18   Temp: 97.9 °F (36.6 °C)   SpO2: 100%            Physical Exam  Constitutional:       Appearance: Normal appearance. HENT:      Head: Normocephalic. Nose: Nose normal.      Mouth/Throat:      Mouth: Mucous membranes are moist.   Eyes:      Pupils: Pupils are equal, round, and reactive to light.    Neck:      Musculoskeletal: Normal range of motion and neck supple. Comments: Tender along the left trapezius  Cardiovascular:      Rate and Rhythm: Normal rate and regular rhythm. Pulses: Normal pulses. Pulmonary:      Effort: Pulmonary effort is normal.   Musculoskeletal:      Right lower leg: No edema. Left lower leg: No edema. Skin:     General: Skin is warm and dry. Capillary Refill: Capillary refill takes less than 2 seconds. Neurological:      General: No focal deficit present. Mental Status: She is alert. Sensory: No sensory deficit. Motor: No weakness. Psychiatric:         Mood and Affect: Mood normal.          MDM       Procedures      Her symptoms do not suggest disc pathology. Her symptoms do not suggest cauda equina or cord compression. She has a normal neurologic exam.  We will give her some ibuprofen here, and have her take it scheduled. She had not been taking this as scheduled. We will also add lidocaine patches and add methocarbamol. No opiates at this time. I am referring her to spine if needed next week. No MRI or C-spine x-rays needed at this time. There has been no trauma. Warning signs for return have been given. Please see her discharge instructions as well.

## 2020-05-06 ENCOUNTER — HOSPITAL ENCOUNTER (OUTPATIENT)
Dept: NUTRITION | Age: 30
End: 2020-05-06

## 2020-05-11 ENCOUNTER — HOSPITAL ENCOUNTER (OUTPATIENT)
Dept: NUTRITION | Age: 30
Discharge: HOME | End: 2020-05-11
Payer: COMMERCIAL

## 2020-05-11 PROCEDURE — 97802 MEDICAL NUTRITION INDIV IN: CPT | Performed by: DIETITIAN, REGISTERED

## 2020-05-11 NOTE — PROGRESS NOTES
Yunier Johnson was informed of the inherent limitations of a virtual visit,  and has consented to a virtual therapy visit on 2020. Information regarding emergency contact information for this patient during this visit is to contact: Marlee Rodriguez at [phone] in addition to calling 911. The patient was informed that at any time during the virtual visit, they can decide to stop the virtual visit. The patient verified that they are physically located in the [facility] for this virtual visit. [id number] [facility]     Nutrition Assessment - Medical Nutrition Therapy   Outpatient Initial Evaluation         Patient Name: Yunier Johnson : 1990   Treatment Diagnosis: Prediabetes   Referral Source: Tara Horta NP [facility]): 2020     Gender: female Age: 31 y.o. Ht: 61 in Wt:  210 (Self Reported) lb  kg   BMI: 39.7 BMR   Male  BMR Female 1620     Past Medical History:  Asthma, smoker, Migraine headaches without aura, anxiety, depression, insomnia,      Pertinent Medications:     Current Outpatient Medications:     lidocaine (LIDODERM) 5 %, Apply patch to the affected area for 12 hours a day and remove for 12 hours a day., Disp: 30 Each, Rfl: 0    methocarbamoL (ROBAXIN) 500 mg tablet, Take 1-2 Tabs by mouth four (4) times daily as needed for Muscle Spasm(s). , Disp: 20 Tab, Rfl: 0    ergocalciferol (VITAMIN D2) 1,250 mcg (50,000 unit) capsule, Take 50,000 Units by mouth., Disp: , Rfl:     divalproex DR (DEPAKOTE) 500 mg tablet, Take  by mouth three (3) times daily. , Disp: , Rfl:     budesonide-formoterol (SYMBICORT) 160-4.5 mcg/actuation HFAA, Take 2 Puffs by inhalation two (2) times a day., Disp: , Rfl:     LORazepam (ATIVAN) 1 mg tablet, TAKE 1 TABLET BY MOUTH TWICE A DAY AS NEEDED FOR ANXIETY OR PANIC ATTACKS, Disp: 60 Tab, Rfl: 0    albuterol (PROVENTIL HFA, VENTOLIN HFA, PROAIR HFA) 90 mcg/actuation inhaler, Take 2 Puffs by inhalation every four (4) hours as needed for Wheezing., Disp: 1 Inhaler, Rfl: 0    albuterol (PROVENTIL VENTOLIN) 2.5 mg /3 mL (0.083 %) nebulizer solution, 3 mL by Nebulization route every four (4) hours as needed for Wheezing., Disp: 24 Each, Rfl: 0    Medications Different than listed in chart:  Not taking Robaxin  Vitamin D3 5,000 international units  Per day. Switched 3 months ago from 50,000 international units  Once weekly. Not taking Depakote  Ativan 2mg 2 times per day as needed  Respiradone 0.5mg per day (pt suspects it has led to weight gain) prescribed by former Psychiatrist. No current psychiatrist. Trying to find a new one. Trazadone 25mg 1 time per day (for insomnia) prescribed by former Psychiatrist  Lexapro 10mg per day prescribed by former Psychiatrist      Biochemical Data:   Lab Results   Component Value Date/Time    Hemoglobin A1c (POC) 5.1 08/21/2018 08:55 AM     Lab Results   Component Value Date/Time    Sodium 142 02/22/2020 07:24 AM    Potassium 3.9 02/22/2020 07:24 AM    Chloride 102 02/22/2020 07:24 AM    CO2 27 02/22/2020 07:24 AM    Anion gap 13 02/22/2020 07:24 AM    Glucose 121 (H) 02/22/2020 07:24 AM    BUN 9 02/22/2020 07:24 AM    Creatinine 0.91 02/22/2020 07:24 AM    BUN/Creatinine ratio 10 (L) 02/22/2020 07:24 AM    GFR est AA >60 02/22/2020 07:24 AM    GFR est non-AA >60 02/22/2020 07:24 AM    Calcium 9.5 02/22/2020 07:24 AM    Bilirubin, total 0.2 02/22/2020 07:24 AM    AST (SGOT) 23 02/22/2020 07:24 AM    Alk.  phosphatase 150 (H) 02/22/2020 07:24 AM    Protein, total 9.3 (H) 02/22/2020 07:24 AM    Albumin 4.1 02/22/2020 07:24 AM    Globulin 5.2 (H) 02/22/2020 07:24 AM    A-G Ratio 0.8 (L) 02/22/2020 07:24 AM    ALT (SGPT) 31 02/22/2020 07:24 AM     No results found for: CHOL, CHOLPOCT, CHOLX, CHLST, CHOLV, HDL, HDLPOC, HDLP, LDL, LDLCPOC, LDLC, DLDLP, VLDLC, VLDL, TGLX, TRIGL, TRIGP, TGLPOCT, CHHD, CHHDX  Lab Results   Component Value Date/Time    ALT (SGPT) 31 02/22/2020 07:24 AM    AST (SGOT) 23 02/22/2020 07:24 AM    Alk. phosphatase 150 (H) 02/22/2020 07:24 AM    Bilirubin, total 0.2 02/22/2020 07:24 AM     Lab Results   Component Value Date/Time    Creatinine 0.91 02/22/2020 07:24 AM     Lab Results   Component Value Date/Time    BUN 9 02/22/2020 07:24 AM     No results found for: MCACR, MCA1, MCA2, MCA3, MCAU, MCAU2, MCALPOCT     Assessment:   Pt is a 33yo female here today for help with diagnosis of prediabetes. Family hx of diabetes. Heart disease runs in family as well. Works sometimes. Minimal movement during the day. Never enjoyed exercise. Willing to walk at home. Checking blood sugars at home sometimes 10 times per day. Having fears of low blood sugars. If seeing 100mg will eat to keep it from dropping. Currently seeing a therapist, but no longer has a psychiatrist. She is looking for a new one. Food & Nutrition: B- made mom eggs (2-3 normally) gravy (1 cup) and biscuits (1.5) and crocker (2 slices) + bottle of water and glass of OJ 4-6oz  Typical = 1 toast with 3 eggs and berries - previously sugary cereal (2 cups + milk whole) or instant oatmeal (2 flavored ones) and stopped   S- protein bars or belvita biscuits. Gas station for steak and cheese sub 6\"  L- difficulty remembering. Often steak and cheese sub or for snack + mac and cheese (1-1.5 cups)  S- yes. Snacking often. Lunchables, vegetable tray more recently (nuts and vegetables), greek yogurt with fruit in it. - all from [facility] or [employer]  D- mother's day dinner: cheese burger (no bread), hot dog (no bun), mac and cheese (1.5 cups), baked beans (1/2 cup-3/4 cup), few cupcakes regular size with icing  S- none. Sometimes eating after dinner if eating dinner earlier. Drinks: rare soda. Was drinking more. 3 pepsi per week (1/2 can), no sweet tea, trying to stick with water or zero gatorade. No alcohol. Eating from convenience store daily. Eating fast food more often. Some fear of going to grocery store.    Wants to cook more. Moderate confidence level in cooking. Estimate Needs   Calories:   Protein:  Carbs: Fat:    Kcal/day  g/day  g/day  g/day        percent: 25  45  30               Nutrition Diagnosis Food and nutrition related knowledge deficit   R/T lack of prior education for pre-diabetes AEB pt questions today    Physical inactivity R/T lack of desire or enjoyment for exercise AEB pt report of majority of day spent sitting and no routine. Excessive carbohydrate intake R/T fear of low blood sugars AEB pt snacking constantly during the day when seeing blood sugars around 100mg/dl     Nutrition Intervention &  Education: Educated pt on the pathophysiology of Pre Diabetes, insulin resistance and the rationale for dietary modifications and increased activity. Educated pt on non-starchy vegetables, and complex carbohydrate food sources. Discussed limiting carbohydrates, meal timing, and appropriate serving sizes.  Set exercise goals   Handouts Provided: [x]  Carbohydrates  []  Protein  [x]  Non-starchy Vegatbles  []  Food Label  []  Meal and Snack Ideas  []  Food Journals []  Diabetes  []  Cholesterol  []  Sodium  []  Gen Nutr Guidelines  []  SBGM Guidelines  []  Others:   Information Reviewed with: pt   Readiness to Change Stage: []  Pre-contemplative    []  Contemplative  [x]  Preparation               []  Action                  []  Maintenance   Potential Barriers to Learning: []  Decline in memory    []  Language barrier   []  Other:  [x]  Emotional                  []  Limited mobility  []  Lack of motivation     [] Vision, hearing or cognitive impairment   Expected Compliance: Good     Nutritional Goal - To promote lifestyle changes to result in:    [x]  Weight loss  [x]  Improved diabetic control  []  Decreased cholesterol levels  []  Decreased blood pressure  []  Weight maintenance []  Preventing any interactions associated with food allergies  []  Adequate weight gain toward goal weight  []  Other: Patient Goals:   - reduce portions at meals by using a smaller plate and include a non-starchy vegetable. - Improve physical activity w/goal to walking for 15 minutes 4 times per week. Starting today. - check blood in the morning before eating. May check 3-4 hours after eating.     - reduce snacking throughout the day when not hungry. Try water first to see if it's thirst. Then veggies and nuts. Limit to 1 snack between each meal if needed. Total Time Spent: 60min    Dietitian Signature: Christa Valverde MS, RD, CSSD Date: 5/11/2020   Follow-up: 4 week Time: 10:34 AM     Angy Velazquez is a 31 y.o. female being evaluated by a Virtual Visit (video visit) encounter to address concerns as mentioned above. A caregiver was present when appropriate. Due to this being a TeleHealth encounter (During [place] [address] public health emergency), evaluation of the following areas was limited: Nutrition Focused Physical Exam. Pursuant to the emergency declaration under the [address] [place] [address], [address] [place] [address] authority and the [employer] and [employer] Act, this Virtual Visit was conducted with patient's (and/or legal guardian's) consent, to reduce the risk of exposure to COVID-19 and provide necessary medical care. Services were provided through a video synchronous discussion virtually to substitute for in-person encounter. --Michael Dover on 5/11/2020 at 10:34 AM    An electronic signature was used to authenticate this note.

## 2020-06-09 ENCOUNTER — HOSPITAL ENCOUNTER (OUTPATIENT)
Dept: NUTRITION | Age: 30
Discharge: HOME | End: 2020-06-09
Payer: COMMERCIAL

## 2020-06-09 PROCEDURE — 97803 MED NUTRITION INDIV SUBSEQ: CPT | Performed by: DIETITIAN, REGISTERED

## 2020-06-09 NOTE — PROGRESS NOTES
Yunier Johnson was informed of the inherent limitations of a virtual visit,  and has consented to a virtual therapy visit on 2020. Information regarding emergency contact information for this patient during this visit is to contact: Marlee Rodriguez at [phone] in addition to calling 911. The patient was informed that at any time during the virtual visit, they can decide to stop the virtual visit. The patient verified that they are physically located in the [facility] for this virtual visit. Technical difficulties. Used doxy. me    NUTRITION  FOLLOW-UP TREATMENT NOTE  Patient Name: Yunier Johnson         Date: 2020  : 1990    YES Patient  Verified  Diagnosis: Prediabetes   In time: 9:30am          Out time:   9:45am   Total Treatment Time (min): 45     SUBJECTIVE/ASSESSMENT  Current Wt: 205 (different scale) Previous Wt: 210 (Self Reported) Wt Change:      Initial Wt: 210 (Self Reported) Total Wt change:  Height: 61     Changes in medication or medical history? Any new allergies, surgeries or procedures? YES/NO    If yes, update Summary List   Saw psychologist during last month. Pt states she was told she does not have bipolar disorder. Positive for generalized anxiety and major depression. Has a therapist and was previously talking with her 3 times per week. No sessions in 2 weeks. Neck strain. Now taking muscle relaxer. Has not started Meloxicam.   Has not started Baclofen   Switched to Vitamin D3 5000 international units  Per day     Nutrition Diagnosis        Diagnosis Status:   Food and nutrition related knowledge deficit   R/T lack of prior education for pre-diabetes AEB pt questions today  [x]  Improved []  No Change    []  Declined   []  Discontinued     Physical inactivity R/T lack of desire or enjoyment for exercise AEB pt report of majority of day spent sitting and no routine.    []  Improved [x]  No Change    []  Declined   []  Discontinued     Excessive carbohydrate intake R/T fear of low blood sugars AEB pt snacking constantly during the day when seeing blood sugars around 100mg/dl  [x]  Improved []  No Change    []  Declined   []  Discontinued        Nutrition Monitoring and Evaluation: Pants are fitting looser. Has not used same scale as previous so pt is unsure of weight loss. Pt forgot what non-starchy vegetables are. Reviewed. More home cooked meals made by fiance (grilling)  Increased fruits and vegetables. veg (cauliflower, broccoli, salad, spinach, tomatoes, potatoes less often, corn,   Fruit(srawberries, cantalope, blue berries, avocados)  Less soda, junk foods (cakes, cookies, sweets) - previously 2-3 sodas per week. No soda in 2-3 weeks. Strained neck and shoulder. Less activity this past week. Checking blood sugar less often than previous. SMBG= 118-120mg/dl  Saw 86mg/dl one day. Memory issues make recall difficult    B-cottage cheese and fruit mix+ 2 boiled eggs + water  S- unable to remember  Late lunch- sweet and sour chicken and fried rice (half of portion would have eaten in the past)  S- 11pm: few pieces of sweet and sour chicken  Drinks: water, sugar free drink mix, no juice/tea/soda, added v8    Previous Goals:  Met. Continued. - reduce portions at meals by using a smaller plate and include a non-starchy vegetable.      Met. More yard work as well, cleaning, less sitting. - Improve physical activity w/goal to walking for 15 minutes 4 times per week. Starting today.      Met. - check blood in the morning before eating. May check 3-4 hours after eating.      Improved. Less snacking. Tried almonds or yogurt or fiber one bar intake. - reduce snacking throughout the day when not hungry. Try water first to see if it's thirst. Then veggies and nuts. Limit to 1 snack between each meal if needed.       Nutrition Prescription and  Intervention Increase physical activity with goal of 150-210 min per week of activity including cardio, strength, and flexibility  Portion control to decrease total calorie intake  Improve mindfulness and hunger/fullness cues to reduce snacking  Self monitoring with [employer] Pal nidai for awareness of food choices and aiding in memory for recall     Patient Education:  [x]  Review current plan with patient   []  Other: Reviewed non starchy vegetables, checking blood sugars, treating low blood sugars. Handouts/  Information Provided: []  Carbohydrates  []  Protein  []  Fiber  []  Serving Sizes  []  Fluids  []  General guidelines []  Diabetes  []  Cholesterol  []  Sodium  []  SBGM  [x]  Food Journals  []  Others:      Patient Goals - CONTINUE reduce portions at meals by using a smaller plate and include a non-starchy vegetable. -exercise: be as active as able while strain is healing. Cumulative 15min each day  - increase vegetable intake to both lunch and dinner 4 days per week minimum  -use My[employer] Telemetry Pal 3 days per week to increase mindfulness and awareness of food choices and aid with memory     PLAN  [x]  Continue on current plan []  Follow-up PRN   []  Discharge due to :    [x]  Next appt: 2 weeks     Dietitian: Ranjeet Thomas MS, RD, CSSD    Date: 6/9/2020 Time: 9:25 AM     Alfonso Gray is a 31 y.o. female being evaluated by a Virtual Visit (video visit) encounter to address concerns as mentioned above. A caregiver was present when appropriate. Due to this being a TeleHealth encounter (During [place] [address] public health emergency), evaluation of the following areas was limited: Nutrition Focused Physical Exam. Pursuant to the emergency declaration under the [address] [place] [address] and the [employer] Act, this Virtual Visit was conducted with patient's (and/or legal guardian's) consent, to reduce the risk of exposure to COVID-19 and provide necessary medical care.       Services were provided through a video synchronous discussion virtually to substitute for in-person encounter. --Ruth Garner on 6/9/2020 at 9:25 AM    An electronic signature was used to authenticate this note.

## 2020-06-22 ENCOUNTER — APPOINTMENT (OUTPATIENT)
Dept: NUTRITION | Age: 30
End: 2020-06-22
Payer: COMMERCIAL

## 2020-08-31 ENCOUNTER — HOSPITAL ENCOUNTER (OUTPATIENT)
Dept: MRI IMAGING | Age: 30
Discharge: HOME | End: 2020-08-31
Attending: FAMILY MEDICINE
Payer: COMMERCIAL

## 2020-08-31 DIAGNOSIS — M54.2 CERVICALGIA: ICD-10-CM

## 2020-08-31 DIAGNOSIS — M54.12 RADICULOPATHY, CERVICAL: ICD-10-CM

## 2020-08-31 DIAGNOSIS — M50.30 DDD (DEGENERATIVE DISC DISEASE), CERVICAL: ICD-10-CM

## 2020-08-31 DIAGNOSIS — M47.812 OSTEOARTHRITIS OF FACET JOINT OF CERVICAL SPINE: ICD-10-CM

## 2020-08-31 PROCEDURE — 72141 MRI NECK SPINE W/O DYE: CPT

## 2022-03-18 PROBLEM — G47.00 INSOMNIA DISORDER WITH NON-SLEEP DISORDER MENTAL COMORBIDITY: Status: ACTIVE | Noted: 2018-09-24

## 2022-03-18 PROBLEM — F32.89 MINOR DEPRESSIVE DISORDER: Status: ACTIVE | Noted: 2018-11-14

## 2022-03-19 PROBLEM — K80.20 SYMPTOMATIC CHOLELITHIASIS: Status: ACTIVE | Noted: 2018-05-24

## 2022-03-19 PROBLEM — F41.3 OTHER MIXED ANXIETY DISORDERS: Status: ACTIVE | Noted: 2018-09-24

## 2022-03-19 PROBLEM — D75.839 THROMBOCYTOSIS: Status: ACTIVE | Noted: 2018-12-07

## 2022-07-19 ENCOUNTER — HOSPITAL ENCOUNTER (EMERGENCY)
Age: 32
Discharge: HOME | End: 2022-07-19
Attending: EMERGENCY MEDICINE
Payer: COMMERCIAL

## 2022-07-19 ENCOUNTER — APPOINTMENT (OUTPATIENT)
Dept: ULTRASOUND IMAGING | Age: 32
End: 2022-07-19
Attending: EMERGENCY MEDICINE
Payer: COMMERCIAL

## 2022-07-19 VITALS
BODY MASS INDEX: 33.3 KG/M2 | SYSTOLIC BLOOD PRESSURE: 132 MMHG | TEMPERATURE: 98.2 F | HEIGHT: 61 IN | DIASTOLIC BLOOD PRESSURE: 76 MMHG | RESPIRATION RATE: 16 BRPM | HEART RATE: 77 BPM | WEIGHT: 176.37 LBS | OXYGEN SATURATION: 100 %

## 2022-07-19 DIAGNOSIS — N93.9 VAGINAL BLEEDING: ICD-10-CM

## 2022-07-19 DIAGNOSIS — R10.30 LOWER ABDOMINAL PAIN: Primary | ICD-10-CM

## 2022-07-19 DIAGNOSIS — O03.9 SPONTANEOUS ABORTION: ICD-10-CM

## 2022-07-19 LAB
ALBUMIN SERPL-MCNC: 3.4 G/DL (ref 3.5–5)
ALBUMIN/GLOB SERPL: 0.8 {RATIO} (ref 1.1–2.2)
ALP SERPL-CCNC: 111 U/L (ref 45–117)
ALT SERPL-CCNC: 14 U/L (ref 12–78)
ANION GAP SERPL CALC-SCNC: 8 MMOL/L (ref 5–15)
APPEARANCE UR: ABNORMAL
AST SERPL-CCNC: 17 U/L (ref 15–37)
BACTERIA URNS QL MICRO: ABNORMAL /HPF
BASOPHILS # BLD: 0 K/UL (ref 0–0.1)
BASOPHILS NFR BLD: 0 % (ref 0–1)
BILIRUB SERPL-MCNC: 0.2 MG/DL (ref 0.2–1)
BILIRUB UR QL: NEGATIVE
BUN SERPL-MCNC: 15 MG/DL (ref 6–20)
BUN/CREAT SERPL: 16 (ref 12–20)
CALCIUM SERPL-MCNC: 9.2 MG/DL (ref 8.5–10.1)
CHLORIDE SERPL-SCNC: 103 MMOL/L (ref 97–108)
CO2 SERPL-SCNC: 26 MMOL/L (ref 21–32)
COLOR UR: ABNORMAL
CREAT SERPL-MCNC: 0.91 MG/DL (ref 0.55–1.02)
DIFFERENTIAL METHOD BLD: ABNORMAL
EOSINOPHIL # BLD: 0.3 K/UL (ref 0–0.4)
EOSINOPHIL NFR BLD: 3 % (ref 0–7)
EPITH CASTS URNS QL MICRO: ABNORMAL /LPF
ERYTHROCYTE [DISTWIDTH] IN BLOOD BY AUTOMATED COUNT: 13.9 % (ref 11.5–14.5)
GLOBULIN SER CALC-MCNC: 4.1 G/DL (ref 2–4)
GLUCOSE SERPL-MCNC: 106 MG/DL (ref 65–100)
GLUCOSE UR STRIP.AUTO-MCNC: NEGATIVE MG/DL
HCG SERPL-ACNC: 138 MIU/ML (ref 0–6)
HCG UR QL: POSITIVE
HCT VFR BLD AUTO: 34.3 % (ref 35–47)
HGB BLD-MCNC: 11 G/DL (ref 11.5–16)
HGB UR QL STRIP: ABNORMAL
IMM GRANULOCYTES # BLD AUTO: 0 K/UL (ref 0–0.04)
IMM GRANULOCYTES NFR BLD AUTO: 0 % (ref 0–0.5)
KETONES UR QL STRIP.AUTO: NEGATIVE MG/DL
LEUKOCYTE ESTERASE UR QL STRIP.AUTO: NEGATIVE
LYMPHOCYTES # BLD: 3 K/UL (ref 0.8–3.5)
LYMPHOCYTES NFR BLD: 26 % (ref 12–49)
MCH RBC QN AUTO: 27.7 PG (ref 26–34)
MCHC RBC AUTO-ENTMCNC: 32.1 G/DL (ref 30–36.5)
MCV RBC AUTO: 86.4 FL (ref 80–99)
MONOCYTES # BLD: 0.6 K/UL (ref 0–1)
MONOCYTES NFR BLD: 5 % (ref 5–13)
NEUTS SEG # BLD: 7.4 K/UL (ref 1.8–8)
NEUTS SEG NFR BLD: 66 % (ref 32–75)
NITRITE UR QL STRIP.AUTO: NEGATIVE
NRBC # BLD: 0 K/UL (ref 0–0.01)
NRBC BLD-RTO: 0 PER 100 WBC
PH UR STRIP: 6 [PH] (ref 5–8)
PLATELET # BLD AUTO: 457 K/UL (ref 150–400)
PMV BLD AUTO: 9.4 FL (ref 8.9–12.9)
POTASSIUM SERPL-SCNC: 3.8 MMOL/L (ref 3.5–5.1)
PROT SERPL-MCNC: 7.5 G/DL (ref 6.4–8.2)
PROT UR STRIP-MCNC: NEGATIVE MG/DL
RBC # BLD AUTO: 3.97 M/UL (ref 3.8–5.2)
RBC #/AREA URNS HPF: ABNORMAL /HPF (ref 0–5)
SODIUM SERPL-SCNC: 137 MMOL/L (ref 136–145)
SP GR UR REFRACTOMETRY: 1.02 (ref 1–1.03)
UR CULT HOLD, URHOLD: NORMAL
UROBILINOGEN UR QL STRIP.AUTO: 0.2 EU/DL (ref 0.2–1)
WBC # BLD AUTO: 11.3 K/UL (ref 3.6–11)
WBC URNS QL MICRO: ABNORMAL /HPF (ref 0–4)

## 2022-07-19 PROCEDURE — 36415 COLL VENOUS BLD VENIPUNCTURE: CPT

## 2022-07-19 PROCEDURE — 85025 COMPLETE CBC W/AUTO DIFF WBC: CPT

## 2022-07-19 PROCEDURE — 76830 TRANSVAGINAL US NON-OB: CPT

## 2022-07-19 PROCEDURE — 81025 URINE PREGNANCY TEST: CPT

## 2022-07-19 PROCEDURE — 81001 URINALYSIS AUTO W/SCOPE: CPT

## 2022-07-19 PROCEDURE — 99284 EMERGENCY DEPT VISIT MOD MDM: CPT

## 2022-07-19 PROCEDURE — 76801 OB US < 14 WKS SINGLE FETUS: CPT

## 2022-07-19 PROCEDURE — 80053 COMPREHEN METABOLIC PANEL: CPT

## 2022-07-19 PROCEDURE — 84702 CHORIONIC GONADOTROPIN TEST: CPT

## 2022-07-19 RX ORDER — MONTELUKAST SODIUM 10 MG/1
10 TABLET ORAL DAILY
COMMUNITY

## 2022-07-19 RX ORDER — OMEPRAZOLE 20 MG/1
20 TABLET, DELAYED RELEASE ORAL DAILY
COMMUNITY

## 2022-07-19 NOTE — ED TRIAGE NOTES
Pt c/o vaginal bleeding and pelvic pain/bloating x 3 weeks. Pt states that she took a UPT at home and it was negative.

## 2022-07-19 NOTE — ED NOTES
[facility]. Renzo Lara now speaking with Dr. Emmanuel Stevens.

## 2022-07-19 NOTE — ED PROVIDER NOTES
HPI   51-year-old female with a past medical history of bipolar disorder, GERD, migraines, and asthma presents to the emergency department due to lower abdominal pain and vaginal bleeding. Patient has had 3 weeks of vaginal bleeding. She says her bleeding has not been severe but is just been persistent. She also says over the last several days she has had lower abdominal pain that feels worse in the left lower quadrant. She says the pain feels like gas. She denies any vaginal discharge or urinary symptoms. No diarrhea. No blood in her stool. No nausea or vomiting. No fevers or chills. She says she has been trying to get pregnant for about 10 years but has been unable to do so. She said she had a negative urine pregnancy test at home.     Past Medical History:   Diagnosis Date    Asthma     Bipolar disorder (manic depression) ([facility] [address]     Community acquired pneumonia     GERD (gastroesophageal reflux disease)     Headache(784.0)     Migraine    Heart murmur 1993    Lung nodule     Neurological disorder     migraines    Second hand smoke exposure        Past Surgical History:   Procedure Laterality Date    HX CHOLECYSTECTOMY      HX HEENT      wisdom teeth extraction    HX HEENT      BMWT         Family History:   Problem Relation Age of Onset    Diabetes Father     Heart Disease Father     Hypertension Father     Anxiety Father     COPD Father     Diabetes Mother     Hypertension Mother     Anxiety Mother     COPD Mother     Diabetes Paternal Grandmother        Social History     Socioeconomic History    Marital status: SINGLE     Spouse name: Not on file    Number of children: Not on file    Years of education: Not on file    Highest education level: Not on file   Occupational History    Not on file   Tobacco Use    Smoking status: Former Smoker     Packs/day: 1.00     Years: 5.00     Pack years: 5.00     Quit date: 2017     Years since quittin.0    Smokeless tobacco: Never Used   Substance and Sexual Activity    Alcohol use: No    Drug use: No     Frequency: 1.0 times per week     Comment: denies any marijuana    Sexual activity: Yes     Partners: Male     Birth control/protection: Condom   Other Topics Concern    Not on file   Social History Narrative    ** Merged History Encounter **          Social Determinants of Health     Financial Resource Strain:     Difficulty of Paying Living Expenses: Not on file   Food Insecurity:     Worried About Running Out of Food in the Last Year: Not on file    Nate of Food in the Last Year: Not on file   Transportation Needs:     Lack of Transportation (Medical): Not on file    Lack of Transportation (Non-Medical): Not on file   Physical Activity:     Days of Exercise per Week: Not on file    Minutes of Exercise per Session: Not on file   Stress:     Feeling of Stress : Not on file   Social Connections:     Frequency of Communication with Friends and Family: Not on file    Frequency of Social Gatherings with Friends and Family: Not on file    Attends Roman [employer] Services: Not on file    Active Member of [employer] or Organizations: Not on file    Attends Club or Organization Meetings: Not on file    Marital Status: Not on file   Intimate Partner Violence:     Fear of Current or Ex-Partner: Not on file    Emotionally Abused: Not on file    Physically Abused: Not on file    Sexually Abused: Not on file   Housing Stability:     Unable to Pay for Housing in the Last Year: Not on file    Number of [place] in the Last Year: Not on file    Unstable Housing in the Last Year: Not on file         ALLERGIES: Diclofenac    Review of Systems   A complete review of systems was performed and all systems reviewed are negative unless otherwise documented in HPI    There were no vitals filed for this visit.          Physical Exam  Constitutional:       Comments: Resting comfortably no acute distress   HENT:      Mouth/Throat: Comments: Moist mucous membranes  Eyes:      General: No scleral icterus. Extraocular Movements: Extraocular movements intact. Cardiovascular:      Comments: Regular rate and rhythm. Normal capillary refill  Pulmonary:      Effort: Pulmonary effort is normal. No respiratory distress. Breath sounds: Normal breath sounds. No wheezing or rales. Abdominal:      Comments: Soft. No distention. Mild tenderness throughout the lower abdomen without rebound tenderness or guarding. No CVA tenderness   Skin:     General: Skin is warm and dry. Neurological:      Comments: Awake and alert. Speech is normal.  GCS 15          MDM   29-year-old female who presents with the above chief complaint. Vitals are stable. She has mild tenderness throughout her lower abdomen that is quite low and seems more in her pelvis. She has no rebound tenderness or guarding. Low suspicion for an acute intra-abdominal process such as diverticulitis or appendicitis. Nursing did obtain a urine pregnancy test which was positive. I am more concerned for an ectopic pregnancy or possible spontaneous /threatened miscarriage. We have documentation showing the patient is be positive. Beta-hCG as well as basic labs and urinalysis will be obtained. I will also order a pelvic ultrasound. Overall her labs are reassuring. She is not significantly anemic. Urinalysis shows hematuria which I suspect is from her vaginal bleeding rather than from a urinary source. Her beta-hCG is 138. Pelvic ultrasound was unremarkable. There is no IUP. She has no enlargement of the adnexa. There is no free fluid. Patient said she went to the bathroom and passed some gas and then her pain resolved. Patient currently has no OB/GYN doctor and I discussed the patient's case with Dr. Felisha Robles over at [facility]. She recommended the patient follow-up with the [facility] women to have a repeat beta-hCG. Patient instructed on the need for close follow-up and to return to the emergency department immediately with severe worsening of pain, lightheadedness, syncope, or any other symptoms she feels concerning. She was discharged in stable condition.     Procedures

## 2022-07-19 NOTE — DISCHARGE INSTRUCTIONS
Please contact the [facility] and or the [place] [facility] to get up follow-up appointment to have your beta hCG repeated in 48 hours for a week at the most.  Return to the emergency department with worsening symptoms, lightheadedness or passing out, nausea or vomiting, or anything else you find concerning.

## 2024-02-14 ENCOUNTER — HOSPITAL ENCOUNTER (EMERGENCY)
Facility: HOSPITAL | Age: 34
Discharge: HOME | End: 2024-02-14
Attending: EMERGENCY MEDICINE

## 2024-02-14 VITALS
TEMPERATURE: 98.7 F | RESPIRATION RATE: 16 BRPM | HEART RATE: 72 BPM | HEIGHT: 61 IN | WEIGHT: 173.72 LBS | DIASTOLIC BLOOD PRESSURE: 72 MMHG | OXYGEN SATURATION: 99 % | SYSTOLIC BLOOD PRESSURE: 129 MMHG | BODY MASS INDEX: 32.8 KG/M2

## 2024-02-14 DIAGNOSIS — R09.81 NASAL CONGESTION: Primary | ICD-10-CM

## 2024-02-14 PROCEDURE — 99283 EMERGENCY DEPT VISIT LOW MDM: CPT

## 2024-02-14 RX ORDER — MONTELUKAST SODIUM 10 MG/1
10 TABLET ORAL DAILY
Qty: 30 TABLET | Refills: 0 | Status: SHIPPED | OUTPATIENT
Start: 2024-02-14 | End: 2024-03-15

## 2024-02-14 RX ORDER — FLUTICASONE PROPIONATE 50 MCG
2 SPRAY, SUSPENSION (ML) NASAL DAILY
Qty: 16 G | Refills: 0 | Status: SHIPPED | OUTPATIENT
Start: 2024-02-14

## 2024-02-14 RX ORDER — ALBUTEROL SULFATE 90 UG/1
2 AEROSOL, METERED RESPIRATORY (INHALATION) EVERY 4 HOURS PRN
Qty: 18 G | Refills: 0 | Status: SHIPPED | OUTPATIENT
Start: 2024-02-14

## 2024-02-14 RX ORDER — BUDESONIDE AND FORMOTEROL FUMARATE DIHYDRATE 160; 4.5 UG/1; UG/1
2 AEROSOL RESPIRATORY (INHALATION) 2 TIMES DAILY
Qty: 10.2 G | Refills: 0 | Status: SHIPPED | OUTPATIENT
Start: 2024-02-14

## 2024-02-14 NOTE — ED PROVIDER NOTES
and regular rhythm.   Pulmonary:      Effort: Pulmonary effort is normal. No respiratory distress.      Breath sounds: No stridor. No wheezing.   Abdominal:      General: There is no distension.   Musculoskeletal:         General: No deformity.      Cervical back: No rigidity.   Skin:     Coloration: Skin is not jaundiced.   Psychiatric:         Behavior: Behavior normal.         DIAGNOSTIC RESULTS       RADIOLOGY:   Non-plain film images such as CT, Ultrasound and MRI are read by the radiologist. Plain radiographic images are visualized and preliminarily interpreted by the emergency physician with the below findings:    See ED course below.     Interpretation per the Radiologist below, if available at the time of this note:    No orders to display        LABS:  Labs Reviewed - No data to display    All other labs were within normal range or not returned as of this dictation.    EMERGENCY DEPARTMENT COURSE and DIFFERENTIAL DIAGNOSIS/MDM:   Medical Decision Making  Differential diagnosis includes AOM, URI, asthma exacerbation    Low suspicion for asthma exacerbation, there is no wheezing, patient speaking full sentences.  She is requesting prescription refills for her asthma meds to maintain her symptoms.  Discussed with OB hospitalist, per up-to-date meds patient is requesting is safe in pregnancy to restart as she was using these prior.  Prescriptions provided.  Patient stable for discharge.    Risk  Prescription drug management.        EKG: All EKG's are interpreted by the Emergency Department Physician who either signs or Co-signs this chart in the absence of a cardiologist.         CRITICAL CARE TIME   Total Cirtical Care time was 0 minutes, excluding separately reportable procedures. There was a high probability of clinically significant/life threatening deterioration in the patient's condition with required my urgent intervention.     CONSULTS:  None    PROCEDURES:  Unless otherwise noted below, none

## 2024-02-14 NOTE — DISCHARGE INSTRUCTIONS
You reported that these were medications that you took daily prior to pregnancy.  They are safe to use in pregnancy to prevent your asthma from flaring up.  Please refill them and start using them.  Follow-up with OB for further management.  Thank you.

## 2024-02-14 NOTE — ED TRIAGE NOTES
Pt reports Lt ear feeling stuffed up and nasal congestion since Sunday. Denies fever, cough, etc. Pt is 7 wks pregnant. At home covid test was negative.

## 2024-03-02 ENCOUNTER — APPOINTMENT (OUTPATIENT)
Facility: HOSPITAL | Age: 34
End: 2024-03-02
Payer: COMMERCIAL

## 2024-03-02 ENCOUNTER — HOSPITAL ENCOUNTER (EMERGENCY)
Facility: HOSPITAL | Age: 34
Discharge: HOME | End: 2024-03-02
Attending: STUDENT IN AN ORGANIZED HEALTH CARE EDUCATION/TRAINING PROGRAM
Payer: COMMERCIAL

## 2024-03-02 VITALS
RESPIRATION RATE: 16 BRPM | WEIGHT: 170.42 LBS | DIASTOLIC BLOOD PRESSURE: 67 MMHG | BODY MASS INDEX: 32.2 KG/M2 | HEART RATE: 70 BPM | OXYGEN SATURATION: 100 % | TEMPERATURE: 99.4 F | SYSTOLIC BLOOD PRESSURE: 113 MMHG

## 2024-03-02 DIAGNOSIS — O41.8X10 SUBCHORIONIC HEMORRHAGE OF PLACENTA IN FIRST TRIMESTER, SINGLE OR UNSPECIFIED FETUS: ICD-10-CM

## 2024-03-02 DIAGNOSIS — O46.8X1 SUBCHORIONIC HEMORRHAGE OF PLACENTA IN FIRST TRIMESTER, SINGLE OR UNSPECIFIED FETUS: ICD-10-CM

## 2024-03-02 DIAGNOSIS — O20.9 VAGINAL BLEEDING BEFORE 22 WEEKS GESTATION: Primary | ICD-10-CM

## 2024-03-02 DIAGNOSIS — O20.0 THREATENED MISCARRIAGE: ICD-10-CM

## 2024-03-02 LAB
ABO + RH BLD: NORMAL
ANION GAP SERPL CALC-SCNC: 14 MMOL/L (ref 5–15)
BASOPHILS # BLD: 0 K/UL (ref 0–0.1)
BASOPHILS NFR BLD: 0 % (ref 0–1)
BLOOD BANK CMNT PATIENT-IMP: NORMAL
BUN SERPL-MCNC: 10 MG/DL (ref 6–20)
BUN/CREAT SERPL: 15 (ref 12–20)
CALCIUM SERPL-MCNC: 9.7 MG/DL (ref 8.5–10.1)
CHLORIDE SERPL-SCNC: 100 MMOL/L (ref 97–108)
CO2 SERPL-SCNC: 24 MMOL/L (ref 21–32)
CREAT SERPL-MCNC: 0.67 MG/DL (ref 0.55–1.02)
DIFFERENTIAL METHOD BLD: ABNORMAL
EOSINOPHIL # BLD: 0.2 K/UL (ref 0–0.4)
EOSINOPHIL NFR BLD: 2 % (ref 0–7)
ERYTHROCYTE [DISTWIDTH] IN BLOOD BY AUTOMATED COUNT: 12.6 % (ref 11.5–14.5)
GLUCOSE SERPL-MCNC: 96 MG/DL (ref 65–100)
HCG SERPL-ACNC: ABNORMAL MIU/ML (ref 0–6)
HCG UR QL: POSITIVE
HCT VFR BLD AUTO: 39.4 % (ref 35–47)
HGB BLD-MCNC: 13.3 G/DL (ref 11.5–16)
IMM GRANULOCYTES # BLD AUTO: 0 K/UL (ref 0–0.04)
IMM GRANULOCYTES NFR BLD AUTO: 0 % (ref 0–0.5)
LYMPHOCYTES # BLD: 2.5 K/UL (ref 0.8–3.5)
LYMPHOCYTES NFR BLD: 19 % (ref 12–49)
MCH RBC QN AUTO: 28.7 PG (ref 26–34)
MCHC RBC AUTO-ENTMCNC: 33.8 G/DL (ref 30–36.5)
MCV RBC AUTO: 84.9 FL (ref 80–99)
MONOCYTES # BLD: 0.7 K/UL (ref 0–1)
MONOCYTES NFR BLD: 6 % (ref 5–13)
NEUTS SEG # BLD: 9.9 K/UL (ref 1.8–8)
NEUTS SEG NFR BLD: 73 % (ref 32–75)
NRBC # BLD: 0 K/UL (ref 0–0.01)
NRBC BLD-RTO: 0 PER 100 WBC
PLATELET # BLD AUTO: 457 K/UL (ref 150–400)
PMV BLD AUTO: 9.6 FL (ref 8.9–12.9)
POTASSIUM SERPL-SCNC: 3.6 MMOL/L (ref 3.5–5.1)
RBC # BLD AUTO: 4.64 M/UL (ref 3.8–5.2)
SODIUM SERPL-SCNC: 138 MMOL/L (ref 136–145)
WBC # BLD AUTO: 13.5 K/UL (ref 3.6–11)

## 2024-03-02 PROCEDURE — 85025 COMPLETE CBC W/AUTO DIFF WBC: CPT

## 2024-03-02 PROCEDURE — 84702 CHORIONIC GONADOTROPIN TEST: CPT

## 2024-03-02 PROCEDURE — 36415 COLL VENOUS BLD VENIPUNCTURE: CPT

## 2024-03-02 PROCEDURE — 86901 BLOOD TYPING SEROLOGIC RH(D): CPT

## 2024-03-02 PROCEDURE — 80048 BASIC METABOLIC PNL TOTAL CA: CPT

## 2024-03-02 PROCEDURE — 99284 EMERGENCY DEPT VISIT MOD MDM: CPT

## 2024-03-02 PROCEDURE — 76801 OB US < 14 WKS SINGLE FETUS: CPT

## 2024-03-02 PROCEDURE — 81025 URINE PREGNANCY TEST: CPT

## 2024-03-02 PROCEDURE — 76817 TRANSVAGINAL US OBSTETRIC: CPT

## 2024-03-02 PROCEDURE — 86900 BLOOD TYPING SEROLOGIC ABO: CPT

## 2024-03-02 ASSESSMENT — LIFESTYLE VARIABLES
HOW OFTEN DO YOU HAVE A DRINK CONTAINING ALCOHOL: NEVER
HOW MANY STANDARD DRINKS CONTAINING ALCOHOL DO YOU HAVE ON A TYPICAL DAY: PATIENT DOES NOT DRINK

## 2024-03-02 ASSESSMENT — PAIN - FUNCTIONAL ASSESSMENT: PAIN_FUNCTIONAL_ASSESSMENT: NONE - DENIES PAIN

## 2024-03-02 NOTE — ED TRIAGE NOTES
Patient states she is 9 weeks pregnant. States she had been spotting on and off for a week, and had an episode of bright red bleeding yesterday with RLQ abd pain. States she had an US on Thursday which was normal.

## 2024-03-02 NOTE — ED PROVIDER NOTES
HPI    33 y.o. female presenting with vaginal bleeding.  She is approximately 9 weeks gestation by dates.  She had slight spotting over the past week.  She was seen by her OB after the spotting began.  She had a normal ultrasound about 4 days ago.  However she had heavier bleeding today with small clots.  Denies dysuria or hematuria.    Joanna Martel   has a past medical history of Anxiety, Asthma, Bipolar disorder (manic depression) ([facility]), Community acquired pneumonia, Depression, GERD (gastroesophageal reflux disease), Headache(784.0), Heart murmur, Lung nodule, Neurological disorder, and Second hand smoke exposure.       Physical Exam  Vitals reviewed.   Constitutional:       General: She is not in acute distress.     Appearance: Normal appearance.   HENT:      Head: Normocephalic.      Mouth/Throat:      Mouth: Mucous membranes are moist.   Eyes:      Extraocular Movements: Extraocular movements intact.      Pupils: Pupils are equal, round, and reactive to light.   Cardiovascular:      Pulses: Normal pulses.   Pulmonary:      Effort: Pulmonary effort is normal. No respiratory distress.   Abdominal:      General: Abdomen is flat. There is no distension.      Tenderness: There is no abdominal tenderness.   Musculoskeletal:      Cervical back: Neck supple. No rigidity.      Right lower leg: No edema.      Left lower leg: No edema.   Skin:     General: Skin is warm.      Capillary Refill: Capillary refill takes less than 2 seconds.   Neurological:      General: No focal deficit present.      Mental Status: She is alert. Mental status is at baseline.   Psychiatric:         Mood and Affect: Mood normal.           LABORATORY TESTS:  Labs Reviewed   CBC WITH AUTO DIFFERENTIAL - Abnormal; Notable for the following components:       Result Value    WBC 13.5 (*)     Platelets 457 (*)     Neutrophils Absolute 9.9 (*)     All other components within normal limits   HCG, QUANTITATIVE, PREGNANCY - Abnormal; Notable for the

## 2024-05-27 NOTE — ED NOTES
Discharge and prescription instructions provided. Pt verbalized understanding. Opportunity provided for questions. Pt discharged home.   
60.8

## (undated) DEVICE — BLADELESS OPTICAL TROCAR WITH FIXATION CANNULA: Brand: VERSAONE

## (undated) DEVICE — NEEDLE HYPO 22GA L1.5IN BLK S STL HUB POLYPR SHLD REG BVL

## (undated) DEVICE — BLADELESS OPTICAL TROCAR WITH FIXATION CANNULA: Brand: VERSAPORT

## (undated) DEVICE — SUTURE SZ 0 27IN 5/8 CIR UR-6  TAPER PT VIOLET ABSRB VICRYL J603H

## (undated) DEVICE — SURGICAL PROCEDURE KIT GEN LAPAROSCOPY LF

## (undated) DEVICE — (D)PREP SKN CHLRAPRP APPL 26ML -- CONVERT TO ITEM 371833

## (undated) DEVICE — FILTER SMK EVAC FLO CLR MEGADYNE

## (undated) DEVICE — DRAPE,UTILTY,TAPE,15X26, 4EA/PK: Brand: MEDLINE

## (undated) DEVICE — STERILE POLYISOPRENE POWDER-FREE SURGICAL GLOVES WITH EMOLLIENT COATING: Brand: PROTEXIS

## (undated) DEVICE — TROCAR SITE CLOSURE DEVICE: Brand: ENDO CLOSE

## (undated) DEVICE — INFECTION CONTROL KIT SYS

## (undated) DEVICE — SOLUTION IV 1000ML 0.9% SOD CHL

## (undated) DEVICE — SPECIMEN RETRIEVAL POUCH: Brand: ENDO CATCH GOLD

## (undated) DEVICE — REM POLYHESIVE ADULT PATIENT RETURN ELECTRODE: Brand: VALLEYLAB

## (undated) DEVICE — SUTURE MCRYL SZ 4-0 L27IN ABSRB UD L19MM PS-2 1/2 CIR PRIM Y426H

## (undated) DEVICE — KENDALL SCD EXPRESS SLEEVES, KNEE LENGTH, MEDIUM: Brand: KENDALL SCD

## (undated) DEVICE — ELECTRODE ES 36CM LAP FLAT L HK COAT DISP CLEANCOAT

## (undated) DEVICE — DEVON™ KNEE AND BODY STRAP 60" X 3" (1.5 M X 7.6 CM): Brand: DEVON

## (undated) DEVICE — SURGICAL PROCEDURE PACK BASIN MAJ SET CUST NO CAUT

## (undated) DEVICE — SUTURE VCRL SZ 3-0 L27IN ABSRB UD L26MM SH 1/2 CIR J416H

## (undated) DEVICE — UNIVERSAL FIXATION CANNULA: Brand: VERSAONE

## (undated) DEVICE — Device

## (undated) DEVICE — TUBING INSUFLTN 10FT LUER -- CONVERT TO ITEM 368568

## (undated) DEVICE — ROCKER SWITCH PENCIL BLADE ELECTRODE, HOLSTER: Brand: EDGE

## (undated) DEVICE — APPLIER LIG CLP 5MM CONTAIN 16 TI CLP DISP ENDO CLP

## (undated) DEVICE — DBD-PACK,LAPAROTOMY,2 REINFORCED GOWNS: Brand: MEDLINE

## (undated) DEVICE — CLICKLINE SCISSORS INSERT: Brand: CLICKLINE

## (undated) DEVICE — APPLICATOR BNDG 1MM ADH PREMIERPRO EXOFIN

## (undated) DEVICE — 3000CC GUARDIAN II: Brand: GUARDIAN